# Patient Record
Sex: MALE | Race: OTHER | Employment: FULL TIME | URBAN - METROPOLITAN AREA
[De-identification: names, ages, dates, MRNs, and addresses within clinical notes are randomized per-mention and may not be internally consistent; named-entity substitution may affect disease eponyms.]

---

## 2019-03-29 ENCOUNTER — LAB VISIT (OUTPATIENT)
Dept: LAB | Facility: HOSPITAL | Age: 39
End: 2019-03-29
Attending: INTERNAL MEDICINE
Payer: COMMERCIAL

## 2019-03-29 ENCOUNTER — OFFICE VISIT (OUTPATIENT)
Dept: INTERNAL MEDICINE | Facility: CLINIC | Age: 39
End: 2019-03-29
Payer: COMMERCIAL

## 2019-03-29 VITALS
BODY MASS INDEX: 24.48 KG/M2 | SYSTOLIC BLOOD PRESSURE: 124 MMHG | DIASTOLIC BLOOD PRESSURE: 80 MMHG | HEART RATE: 107 BPM | HEIGHT: 72 IN | OXYGEN SATURATION: 95 % | WEIGHT: 180.75 LBS

## 2019-03-29 DIAGNOSIS — Z00.00 ENCOUNTER FOR MEDICAL EXAMINATION TO ESTABLISH CARE: Primary | ICD-10-CM

## 2019-03-29 DIAGNOSIS — J32.9 RECURRENT SINUS INFECTIONS: ICD-10-CM

## 2019-03-29 DIAGNOSIS — J21.9 ACUTE BRONCHIOLITIS WITH BRONCHOSPASM: ICD-10-CM

## 2019-03-29 DIAGNOSIS — Z00.00 ENCOUNTER FOR MEDICAL EXAMINATION TO ESTABLISH CARE: ICD-10-CM

## 2019-03-29 DIAGNOSIS — J30.1 SEASONAL ALLERGIC RHINITIS DUE TO POLLEN: ICD-10-CM

## 2019-03-29 LAB
25(OH)D3+25(OH)D2 SERPL-MCNC: 16 NG/ML (ref 30–96)
ALBUMIN SERPL BCP-MCNC: 4.5 G/DL (ref 3.5–5.2)
ALP SERPL-CCNC: 78 U/L (ref 55–135)
ALT SERPL W/O P-5'-P-CCNC: 13 U/L (ref 10–44)
ANION GAP SERPL CALC-SCNC: 8 MMOL/L (ref 8–16)
AST SERPL-CCNC: 18 U/L (ref 10–40)
BASOPHILS # BLD AUTO: 0.05 K/UL (ref 0–0.2)
BASOPHILS NFR BLD: 0.5 % (ref 0–1.9)
BILIRUB SERPL-MCNC: 0.4 MG/DL (ref 0.1–1)
BUN SERPL-MCNC: 9 MG/DL (ref 6–20)
CALCIUM SERPL-MCNC: 10 MG/DL (ref 8.7–10.5)
CHLORIDE SERPL-SCNC: 104 MMOL/L (ref 95–110)
CHOLEST SERPL-MCNC: 173 MG/DL (ref 120–199)
CHOLEST/HDLC SERPL: 4.7 {RATIO} (ref 2–5)
CO2 SERPL-SCNC: 27 MMOL/L (ref 23–29)
COMPLEXED PSA SERPL-MCNC: 0.81 NG/ML (ref 0–4)
CREAT SERPL-MCNC: 0.9 MG/DL (ref 0.5–1.4)
CRP SERPL-MCNC: 3.8 MG/L (ref 0–8.2)
DIFFERENTIAL METHOD: ABNORMAL
EOSINOPHIL # BLD AUTO: 0.4 K/UL (ref 0–0.5)
EOSINOPHIL NFR BLD: 4.7 % (ref 0–8)
ERYTHROCYTE [DISTWIDTH] IN BLOOD BY AUTOMATED COUNT: 14.6 % (ref 11.5–14.5)
ERYTHROCYTE [SEDIMENTATION RATE] IN BLOOD BY WESTERGREN METHOD: 25 MM/HR (ref 0–23)
EST. GFR  (AFRICAN AMERICAN): >60 ML/MIN/1.73 M^2
EST. GFR  (NON AFRICAN AMERICAN): >60 ML/MIN/1.73 M^2
ESTIMATED AVG GLUCOSE: 108 MG/DL (ref 68–131)
GLUCOSE SERPL-MCNC: 88 MG/DL (ref 70–110)
HBA1C MFR BLD HPLC: 5.4 % (ref 4–5.6)
HCT VFR BLD AUTO: 42.9 % (ref 40–54)
HDLC SERPL-MCNC: 37 MG/DL (ref 40–75)
HDLC SERPL: 21.4 % (ref 20–50)
HGB BLD-MCNC: 14.1 G/DL (ref 14–18)
LDLC SERPL CALC-MCNC: 119 MG/DL (ref 63–159)
LYMPHOCYTES # BLD AUTO: 2 K/UL (ref 1–4.8)
LYMPHOCYTES NFR BLD: 20.9 % (ref 18–48)
MCH RBC QN AUTO: 26.6 PG (ref 27–31)
MCHC RBC AUTO-ENTMCNC: 32.9 G/DL (ref 32–36)
MCV RBC AUTO: 81 FL (ref 82–98)
MONOCYTES # BLD AUTO: 0.3 K/UL (ref 0.3–1)
MONOCYTES NFR BLD: 3.5 % (ref 4–15)
NEUTROPHILS # BLD AUTO: 6.5 K/UL (ref 1.8–7.7)
NEUTROPHILS NFR BLD: 70.2 % (ref 38–73)
NONHDLC SERPL-MCNC: 136 MG/DL
PLATELET # BLD AUTO: 305 K/UL (ref 150–350)
PMV BLD AUTO: 9.2 FL (ref 9.2–12.9)
POTASSIUM SERPL-SCNC: 4.2 MMOL/L (ref 3.5–5.1)
PROT SERPL-MCNC: 8.3 G/DL (ref 6–8.4)
RBC # BLD AUTO: 5.31 M/UL (ref 4.6–6.2)
SODIUM SERPL-SCNC: 139 MMOL/L (ref 136–145)
TRIGL SERPL-MCNC: 85 MG/DL (ref 30–150)
TSH SERPL DL<=0.005 MIU/L-ACNC: 0.42 UIU/ML (ref 0.4–4)
WBC # BLD AUTO: 9.32 K/UL (ref 3.9–12.7)

## 2019-03-29 PROCEDURE — 83036 HEMOGLOBIN GLYCOSYLATED A1C: CPT

## 2019-03-29 PROCEDURE — 99999 PR PBB SHADOW E&M-NEW PATIENT-LVL IV: ICD-10-PCS | Mod: PBBFAC,,, | Performed by: INTERNAL MEDICINE

## 2019-03-29 PROCEDURE — 99204 PR OFFICE/OUTPT VISIT, NEW, LEVL IV, 45-59 MIN: ICD-10-PCS | Mod: S$GLB,,, | Performed by: INTERNAL MEDICINE

## 2019-03-29 PROCEDURE — 99999 PR PBB SHADOW E&M-NEW PATIENT-LVL IV: CPT | Mod: PBBFAC,,, | Performed by: INTERNAL MEDICINE

## 2019-03-29 PROCEDURE — 84153 ASSAY OF PSA TOTAL: CPT

## 2019-03-29 PROCEDURE — 85025 COMPLETE CBC W/AUTO DIFF WBC: CPT

## 2019-03-29 PROCEDURE — 99204 OFFICE O/P NEW MOD 45 MIN: CPT | Mod: S$GLB,,, | Performed by: INTERNAL MEDICINE

## 2019-03-29 PROCEDURE — 80061 LIPID PANEL: CPT

## 2019-03-29 PROCEDURE — 36415 COLL VENOUS BLD VENIPUNCTURE: CPT

## 2019-03-29 PROCEDURE — 80053 COMPREHEN METABOLIC PANEL: CPT

## 2019-03-29 PROCEDURE — 82306 VITAMIN D 25 HYDROXY: CPT

## 2019-03-29 PROCEDURE — 86140 C-REACTIVE PROTEIN: CPT

## 2019-03-29 PROCEDURE — 84443 ASSAY THYROID STIM HORMONE: CPT

## 2019-03-29 PROCEDURE — 85652 RBC SED RATE AUTOMATED: CPT

## 2019-03-29 RX ORDER — AZELASTINE 1 MG/ML
1 SPRAY, METERED NASAL 2 TIMES DAILY PRN
Qty: 30 ML | Refills: 11 | Status: SHIPPED | OUTPATIENT
Start: 2019-03-29 | End: 2019-03-29 | Stop reason: SDUPTHER

## 2019-03-29 RX ORDER — MOMETASONE FUROATE 50 UG/1
2 SPRAY, METERED NASAL 2 TIMES DAILY
Qty: 1 EACH | Refills: 11 | Status: SHIPPED | OUTPATIENT
Start: 2019-03-29 | End: 2020-04-09

## 2019-03-29 RX ORDER — DEXAMETHASONE 4 MG/1
4 TABLET ORAL EVERY 6 HOURS
Qty: 28 TABLET | Refills: 0 | Status: SHIPPED | OUTPATIENT
Start: 2019-03-29 | End: 2019-04-05

## 2019-03-29 RX ORDER — ALBUTEROL SULFATE 1.25 MG/3ML
1.25 SOLUTION RESPIRATORY (INHALATION) EVERY 6 HOURS PRN
Qty: 1 BOX | Refills: 11 | Status: SHIPPED | OUTPATIENT
Start: 2019-03-29 | End: 2019-04-11

## 2019-03-29 RX ORDER — NYSTATIN 100000 [USP'U]/ML
4 SUSPENSION ORAL 4 TIMES DAILY
Qty: 240 ML | Refills: 0 | Status: SHIPPED | OUTPATIENT
Start: 2019-03-29 | End: 2019-03-29 | Stop reason: SDUPTHER

## 2019-03-29 RX ORDER — MONTELUKAST SODIUM 10 MG/1
TABLET ORAL
Refills: 3 | COMMUNITY
Start: 2019-03-11 | End: 2019-03-29

## 2019-03-29 RX ORDER — MOXIFLOXACIN HYDROCHLORIDE 400 MG/1
400 TABLET ORAL DAILY
Qty: 15 TABLET | Refills: 0 | Status: SHIPPED | OUTPATIENT
Start: 2019-03-29 | End: 2019-03-29 | Stop reason: SDUPTHER

## 2019-03-29 RX ORDER — AZELASTINE 1 MG/ML
SPRAY, METERED NASAL
Refills: 5 | COMMUNITY
Start: 2019-03-02 | End: 2019-03-29 | Stop reason: SDUPTHER

## 2019-03-29 RX ORDER — DEXAMETHASONE 4 MG/1
4 TABLET ORAL EVERY 6 HOURS
Qty: 28 TABLET | Refills: 0 | Status: SHIPPED | OUTPATIENT
Start: 2019-03-29 | End: 2019-03-29 | Stop reason: SDUPTHER

## 2019-03-29 RX ORDER — AZELASTINE 1 MG/ML
1 SPRAY, METERED NASAL 2 TIMES DAILY PRN
Qty: 30 ML | Refills: 11 | Status: SHIPPED | OUTPATIENT
Start: 2019-03-29 | End: 2020-01-22

## 2019-03-29 RX ORDER — NYSTATIN 100000 [USP'U]/ML
4 SUSPENSION ORAL 4 TIMES DAILY
Qty: 240 ML | Refills: 0 | Status: SHIPPED | OUTPATIENT
Start: 2019-03-29 | End: 2019-04-13

## 2019-03-29 RX ORDER — ALBUTEROL SULFATE 90 UG/1
2 AEROSOL, METERED RESPIRATORY (INHALATION) EVERY 6 HOURS PRN
Qty: 18 G | Refills: 11 | Status: SHIPPED | OUTPATIENT
Start: 2019-03-29 | End: 2019-08-27

## 2019-03-29 RX ORDER — MOMETASONE FUROATE 50 UG/1
2 SPRAY, METERED NASAL 2 TIMES DAILY
Qty: 1 EACH | Refills: 11 | Status: SHIPPED | OUTPATIENT
Start: 2019-03-29 | End: 2019-03-29 | Stop reason: SDUPTHER

## 2019-03-29 RX ORDER — MOMETASONE FUROATE 50 UG/1
SPRAY, METERED NASAL
Refills: 0 | COMMUNITY
Start: 2018-12-21 | End: 2019-03-29 | Stop reason: SDUPTHER

## 2019-03-29 RX ORDER — ALBUTEROL SULFATE 1.25 MG/3ML
1.25 SOLUTION RESPIRATORY (INHALATION) EVERY 6 HOURS PRN
Qty: 1 BOX | Refills: 11 | Status: SHIPPED | OUTPATIENT
Start: 2019-03-29 | End: 2019-03-29 | Stop reason: SDUPTHER

## 2019-03-29 RX ORDER — MOXIFLOXACIN HYDROCHLORIDE 400 MG/1
400 TABLET ORAL DAILY
Qty: 15 TABLET | Refills: 0 | Status: SHIPPED | OUTPATIENT
Start: 2019-03-29 | End: 2019-04-11

## 2019-03-29 RX ORDER — ALBUTEROL SULFATE 90 UG/1
2 AEROSOL, METERED RESPIRATORY (INHALATION) EVERY 6 HOURS PRN
Qty: 18 G | Refills: 11 | Status: SHIPPED | OUTPATIENT
Start: 2019-03-29 | End: 2019-03-29 | Stop reason: SDUPTHER

## 2019-03-29 NOTE — PROGRESS NOTES
"INTERNAL MEDICINE CLINIC    Initial Visit to Establish Care    PRESENTING HISTORY     Previous PCP: Emanuel Evans MD  Chief Complaint/Reason for Visit:     Chief Complaint   Patient presents with    Follow-up     History of Present Illness & ROS : Mr. Miguel Gaffney is a 38 y.o. male.      7-17-18 [facility]  HPI:   Miguel Rivera" is a 38 y.o.male [occupation: Interventional Cardiologist at [employer]    Labs 7/20/18  . ASCVD 10yr risk = 1.2%    - 7/20/18: Glc 92, A1C 5.7     Diet: Red meat 0x/wk. Mostly chicken/fish. Coffee 1-3x/day. Soda 2x/mo. Eggs 1/wk. Milk/Cheese 7x/wk. Fast food 0x/wk.   Exercise: ~8k steps/day    H/o chronic intermittent sinusitis with weather changes. 1mo ago went to [facility] and since then has had a cough. Mostly productive in the AM but dries out throughout the day. Not feeling sick. No F/C/NS. Using nasal saline solution BID. Has not tried other therapies.     At times feels skipped beats, mostly at night. Drinking ~1-3 coffees/day.   Thoughts that you would be better off dead, or of hurting yourself in some way: 0  PHQ-9 Total Score: 0  Past Medical History:   Diagnosis Date    Allergic state     History reviewed. No pertinent surgical history.   Patient's Medications   No medications on file     Allergies:Allergies no known allergies   Family History   Problem Relation Age of Onset    Hypertension Mother    Hypertension Father    Diabetes Father    Colon cancer Neg Hx    Breast cancer Neg Hx    Ovarian cancer Neg Hx    Prostate cancer Neg Hx     Social History    Marital status:      Social History Main Topics    Smoking status: Never Smoker    Smokeless tobacco: Never Used    Alcohol use No   Comment: 2x/mo    Drug use: No    Sexual activity: Yes   Partners: Female   Comment:      Physical Exam:   Blood pressure 113/81, pulse 99, height 1.829 m (6'), weight 80.7 kg (178 lb). Body mass index is 24.14 kg/(m^2). "   ______________________________________________________________________    Today:  Dec he had sinusitis in Dec.  Had recurrence. Treated with Augmentin 10-14 days X 3.  One course of Bactrim. Had medol dose pack.    CT 1/7.19: Pansinusitis and bilateral cervical lymph nodes. Occlusion of bilateral ostiomeatal complexes.    He wants to start from scratch.    Currently he complains of some wheezing for 5 days.  Waking up 4-5 am with phlegm and cough.    Prior to December, he would have 1-2 episodes of sinusitis per year.  Was in [place]. 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    Review of Systems   Constitutional: Negative for chills, fever and weight loss.   HENT: Positive for congestion and sinus pain.    Eyes: Negative for blurred vision.   Respiratory: Positive for cough, sputum production, shortness of breath (waking at night) and wheezing.    Cardiovascular: Negative for chest pain and leg swelling.   Gastrointestinal: Negative for abdominal pain, heartburn, nausea and vomiting.   Genitourinary: Negative for dysuria and urgency.   Musculoskeletal: Negative for back pain and joint pain.   Skin: Negative for rash.   Neurological: Negative for dizziness and headaches.   Psychiatric/Behavioral: Negative for depression.       PAST HISTORY:     Past Medical History:   Diagnosis Date    Recurrent sinus infections     Seasonal allergic rhinitis due to pollen        History reviewed. No pertinent surgical history.    Family History   Problem Relation Age of Onset    Hypertension Mother     Diabetes Father     Heart disease Father 55       Social History     Socioeconomic History    Marital status:      Spouse name: Liam    Number of children: 2    Years of education: None    Highest education level: None   Occupational History    None   Social Needs    Financial resource strain: None    Food insecurity:     Worry: None     Inability: None    Transportation needs:      Medical: None     Non-medical: None   Tobacco Use    Smoking status: Never Smoker    Smokeless tobacco: Never Used   Substance and Sexual Activity    Alcohol use: Yes     Comment: occasional    Drug use: Not Currently    Sexual activity: Yes     Partners: Female   Lifestyle    Physical activity:     Days per week: None     Minutes per session: None    Stress: None   Relationships    Social connections:     Talks on phone: None     Gets together: None     Attends Jehovah's witness service: None     Active member of club or organization: None     Attends meetings of clubs or organizations: None     Relationship status: None    Intimate partner violence:     Fear of current or ex partner: None     Emotionally abused: None     Physically abused: None     Forced sexual activity: None   Other Topics Concern    None   Social History Narrative    Was in [place]. 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    : Liam Horta boys 11 and 7 as of 2019.       MEDICATIONS & ALLERGIES:     Current Outpatient Medications on File Prior to Visit   Medication Sig Dispense Refill    azelastine (ASTELIN) 137 mcg (0.1 %) nasal spray SPR ONCE IEN BID FOR 2 WKS  5     mometasone (NASONEX) 50 mcg/actuation nasal spray USE ONE SPRAY IN EACH NOSTRIL QD UP TO BID  0     montelukast (SINGULAIR) 10 mg tablet TK 1 T PO QD  3     No current facility-administered medications on file prior to visit.         Review of patient's allergies indicates:  No Known Allergies    Medications Reconciliation:   I have reconciled the patient's home medications with the patient/family. I have updated all changes.  Refer to After-Visit Medication List.    OBJECTIVE:     Vital Signs:  Vitals:    03/29/19 1514   BP: 124/80   Pulse: 107     Wt Readings from Last 1 Encounters:   03/29/19 1514 82 kg (180 lb 12.4 oz)     Body mass index is 24.52 kg/m².     Physical Exam:  General: Well developed, well nourished. No distress.  HEENT: Head  is normocephalic, atraumatic; ears are normal.    Eyes: Clear conjunctiva.  Neck: Supple, symmetrical neck; trachea midline.  Lungs: normal respiratory effort. Bilateral expiratory wheezing.  Cardiovascular: Heart with regular rate and rhythm.    Extremities: No LE edema.    Abdomen: Abdomen is soft, non-tender non-distended with normal bowel sounds.  Musculoskeletal: Normal gait.   Genital:  Normal penis. Foreskin retractable. No rash.  Scrotum and epididymis normal. No inguinal hernia.  No inguinal nodes. No rash found.  Rectal: Deferred.  Lymph Nodes: No cervical, supraclavicular or axillary adenopathy.  Psychiatric: Normal affect. Alert.      ASSESSMENT & PLAN:     New patient who has not seen Primary Care Provider at [facility] for the past 3 years.  Patient is new to me. Medical, surgical, social, medication and allergy histories were obtained during this visit.    Encounter for medical examination to establish care  - Reviewed and updated past and current medical problems.  Discussed treatment of current medical problems    -     Lipid panel; Future; Expected date: 03/29/2019  -     CBC auto differential; Future; Expected date: 03/29/2019  -     Comprehensive metabolic panel; Future; Expected date: 03/29/2019  -     TSH; Future; Expected date: 03/29/2019  -     Hemoglobin A1c; Future; Expected date: 03/29/2019  -     PSA, Screening; Future; Expected date: 03/29/2019  -     Vitamin D; Future; Expected date: 09/25/2019  -     Sedimentation rate; Future; Expected date: 03/29/2019  -     C-reactive protein; Future; Expected date: 03/29/2019    Recurrent sinus infections  Seasonal allergic rhinitis due to pollen  Acute bronchiolitis with bronchospasm  - Recurrent sinusitis. Now with acute bronchospasm.    Plan: Treat for 15 days.             Will need PFT on return visit.       -     dexamethasone (DECADRON) 4 MG Tab; Take 1 tablet (4 mg total) by mouth every 6 (six) hours. for 7 days  Dispense: 28 tablet; Refill:  0  -     albuterol (VENTOLIN HFA) 90 mcg/actuation inhaler; Inhale 2 puffs into the lungs every 6 (six) hours as needed for Wheezing. Rescue  Dispense: 18 g; Refill: 11  -     albuterol (ACCUNEB) 1.25 mg/3 mL Nebu; Take 3 mLs (1.25 mg total) by nebulization every 6 (six) hours as needed. Rescue  Dispense: 1 Box; Refill: 11  -     NEBULIZER FOR HOME USE  -     Ambulatory consult to ENT  -     Ambulatory consult to Allergy  -     mometasone (NASONEX) 50 mcg/actuation nasal spray; 2 sprays by Nasal route 2 (two) times daily.  Dispense: 1 each; Refill: 11  -     azelastine (ASTELIN) 137 mcg (0.1 %) nasal spray; 1 spray (137 mcg total) by Nasal route 2 (two) times daily as needed for Rhinitis.  Dispense: 30 mL; Refill: 11  -     moxifloxacin (AVELOX) 400 mg tablet; Take 1 tablet (400 mg total) by mouth once daily. for 15 days  Dispense: 15 tablet; Refill: 0  -     nystatin (MYCOSTATIN) 100,000 unit/mL suspension; Take 4 mLs (400,000 Units total) by mouth 4 (four) times daily. for 15 days  Dispense: 240 mL; Refill: 0    Preventive Health Maintenance:  To review next visit.    Return to Clinic for Follow Up with me:   April 11.      Scheduled Follow-up :  Future Appointments   Date Time Provider Department Center   4/11/2019  5:00 PM Emanuel Evans MD [facility] IM [facility]   4/23/2019  3:20 PM JAMES Marx III, MD [facility] ALLERGY [facility]       After Visit Medication List :     Medication List           Accurate as of 3/29/19  5:14 PM. If you have any questions, ask your nurse or doctor.               START taking these medications    * albuterol 1.25 mg/3 mL Nebu  Commonly known as:  ACCUNEB  Take 3 mLs (1.25 mg total) by nebulization every 6 (six) hours as needed. Rescue  Started by:  Emanuel Evans MD     * albuterol 90 mcg/actuation inhaler  Commonly known as:  VENTOLIN HFA  Inhale 2 puffs into the lungs every 6 (six) hours as needed for Wheezing. Rescue  Started by:  Emanuel Evans MD     dexamethasone 4 MG  Tab  Commonly known as:  DECADRON  Take 1 tablet (4 mg total) by mouth every 6 (six) hours. for 7 days  Started by:  Emanuel Evans MD     moxifloxacin 400 mg tablet  Commonly known as:  AVELOX  Take 1 tablet (400 mg total) by mouth once daily. for 15 days  Started by:  Emanuel Evans MD     nystatin 100,000 unit/mL suspension  Commonly known as:  MYCOSTATIN  Take 4 mLs (400,000 Units total) by mouth 4 (four) times daily. for 15 days  Started by:  Emanuel Evans MD         * This list has 2 medication(s) that are the same as other medications prescribed for you. Read the directions carefully, and ask your doctor or other care provider to review them with you.            CHANGE how you take these medications    azelastine 137 mcg (0.1 %) nasal spray  Commonly known as:  ASTELIN  1 spray (137 mcg total) by Nasal route 2 (two) times daily as needed for Rhinitis.  What changed:  See the new instructions.  Changed by:  Emanuel Evans MD     mometasone 50 mcg/actuation nasal spray  Commonly known as:  NASONEX  2 sprays by Nasal route 2 (two) times daily.  What changed:  See the new instructions.  Changed by:  Emanuel Evans MD        STOP taking these medications    montelukast 10 mg tablet  Commonly known as:  SINGULAIR  Stopped by:  Emanuel Evans MD           Where to Get Your Medications      These medications were sent to [facility] [address] [place] [address] [place] - [address] AT [facility]  [address] [place] [address]    Hours:  24-hours Phone:  [phone]   · albuterol 1.25 mg/3 mL Nebu  · albuterol 90 mcg/actuation inhaler  · azelastine 137 mcg (0.1 %) nasal spray  · dexamethasone 4 MG Tab  · mometasone 50 mcg/actuation nasal spray  · moxifloxacin 400 mg tablet  · nystatin 100,000 unit/mL suspension         Signing Physician:  Emanuel Evans MD

## 2019-03-30 PROBLEM — E55.9 VITAMIN D INSUFFICIENCY: Status: ACTIVE | Noted: 2019-03-30

## 2019-04-11 ENCOUNTER — OFFICE VISIT (OUTPATIENT)
Dept: INTERNAL MEDICINE | Facility: CLINIC | Age: 39
End: 2019-04-11
Payer: COMMERCIAL

## 2019-04-11 VITALS
HEIGHT: 72 IN | SYSTOLIC BLOOD PRESSURE: 126 MMHG | BODY MASS INDEX: 24.61 KG/M2 | WEIGHT: 181.69 LBS | OXYGEN SATURATION: 95 % | DIASTOLIC BLOOD PRESSURE: 68 MMHG | HEART RATE: 105 BPM | TEMPERATURE: 98 F

## 2019-04-11 DIAGNOSIS — E55.9 VITAMIN D INSUFFICIENCY: ICD-10-CM

## 2019-04-11 DIAGNOSIS — J30.1 SEASONAL ALLERGIC RHINITIS DUE TO POLLEN: ICD-10-CM

## 2019-04-11 DIAGNOSIS — J21.9 ACUTE BRONCHIOLITIS WITH BRONCHOSPASM: ICD-10-CM

## 2019-04-11 DIAGNOSIS — J32.9 RECURRENT SINUS INFECTIONS: Primary | ICD-10-CM

## 2019-04-11 PROCEDURE — 99214 PR OFFICE/OUTPT VISIT, EST, LEVL IV, 30-39 MIN: ICD-10-PCS | Mod: S$GLB,,, | Performed by: INTERNAL MEDICINE

## 2019-04-11 PROCEDURE — 99999 PR PBB SHADOW E&M-EST. PATIENT-LVL III: ICD-10-PCS | Mod: PBBFAC,,, | Performed by: INTERNAL MEDICINE

## 2019-04-11 PROCEDURE — 99214 OFFICE O/P EST MOD 30 MIN: CPT | Mod: S$GLB,,, | Performed by: INTERNAL MEDICINE

## 2019-04-11 PROCEDURE — 99999 PR PBB SHADOW E&M-EST. PATIENT-LVL III: CPT | Mod: PBBFAC,,, | Performed by: INTERNAL MEDICINE

## 2019-04-11 RX ORDER — DESLORATADINE 5 MG/1
5 TABLET, ORALLY DISINTEGRATING ORAL DAILY
Qty: 90 TABLET | Refills: 3 | Status: SHIPPED | OUTPATIENT
Start: 2019-04-11 | End: 2019-05-23

## 2019-04-11 RX ORDER — AZITHROMYCIN 500 MG/1
500 TABLET, FILM COATED ORAL DAILY
Qty: 10 TABLET | Refills: 0 | Status: SHIPPED | OUTPATIENT
Start: 2019-04-11 | End: 2019-04-21

## 2019-04-11 RX ORDER — LEVALBUTEROL 1.25 MG/.5ML
1 SOLUTION, CONCENTRATE RESPIRATORY (INHALATION) EVERY 4 HOURS PRN
Qty: 1 BOX | Refills: 11 | Status: SHIPPED | OUTPATIENT
Start: 2019-04-11 | End: 2019-08-23

## 2019-04-11 RX ORDER — IBUPROFEN 100 MG/5ML
2000 SUSPENSION, ORAL (FINAL DOSE FORM) ORAL DAILY
COMMUNITY
Start: 2019-04-11 | End: 2019-08-23

## 2019-04-11 RX ORDER — LORATADINE 10 MG/1
10 TABLET ORAL DAILY
Refills: 0 | COMMUNITY
Start: 2019-04-11 | End: 2019-04-11

## 2019-04-11 RX ORDER — ACETAMINOPHEN 500 MG
1 TABLET ORAL DAILY
Qty: 90 CAPSULE | Refills: 3 | Status: ON HOLD | OUTPATIENT
Start: 2019-04-11 | End: 2019-12-16

## 2019-04-11 NOTE — PROGRESS NOTES
INTERNAL MEDICINE CLINIC  Follow-up Visit Progress Note     PRESENTING HISTORY     PCP: Emanuel Evans MD    Last Clinic Visit with me:     Current Chief Complaint/Problem:    Chief Complaint   Patient presents with    Follow-up     History of Present Illness & ROS: Mr. Miguel Gaffney is a 38 y.o. male.    Went to  ED 4-7 for worsening symptoms.  CT left maxillary sinusitis.    Off steroid 4-6.    He is using Neti-Pot.    Now with clear nasal discharge.  Not SOB.    Answers for HPI/ROS submitted by the patient on 4/10/2019   Cough  Chronicity: recurrent  Progression since onset: gradually worsening  Frequency: every few hours  Cough characteristics: productive of brown sputum  chest pain: No  chills: No  ear pain: No  fever: No  heartburn: Yes  hemoptysis: No  nasal congestion: Yes  postnasal drip: Yes  rhinorrhea: Yes  shortness of breath: Yes  sweats: No  weight loss: No  Aggravated by: lying down, pollens, stress  asthma: No  bronchiectasis: No  bronchitis: No  COPD: No  emphysema: No  environmental allergies: No  pneumonia: No  Treatments tried: OTC cough suppressant, a beta-agonist inhaler, body position changes, leukotriene antagonists, oral steroids      PAST HISTORY:     Past Medical History:   Diagnosis Date    Acute bronchiolitis with bronchospasm 3/29/2019    Recurrent sinus infections     Seasonal allergic rhinitis due to pollen     Vitamin D insufficiency 3/30/2019    Recommend Vitamin D 2000 IU daily.       No past surgical history on file.    Family History   Problem Relation Age of Onset    Hypertension Mother     Diabetes Father     Heart disease Father 55       Social History     Socioeconomic History    Marital status:      Spouse name: Liam    Number of children: 2    Years of education: Not on file    Highest education level: Not on file   Occupational History    Not on file   Social Needs    Financial resource strain: Not on file    Food insecurity:     Worry: Not on  file     Inability: Not on file    Transportation needs:     Medical: Not on file     Non-medical: Not on file   Tobacco Use    Smoking status: Never Smoker    Smokeless tobacco: Never Used   Substance and Sexual Activity    Alcohol use: Yes     Comment: occasional    Drug use: Not Currently    Sexual activity: Yes     Partners: Female   Lifestyle    Physical activity:     Days per week: Not on file     Minutes per session: Not on file    Stress: Not on file   Relationships    Social connections:     Talks on phone: Not on file     Gets together: Not on file     Attends [employer] service: Not on file     Active member of club or organization: Not on file     Attends meetings of clubs or organizations: Not on file     Relationship status: Not on file   Other Topics Concern    Not on file   Social History Narrative    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    : Liam Horta boys 11 and 7 as of 2019.       MEDICATIONS & ALLERGIES:     Current Outpatient Medications on File Prior to Visit   Medication Sig Dispense Refill    albuterol (ACCUNEB) 1.25 mg/3 mL Nebu Take 3 mLs (1.25 mg total) by nebulization every 6 (six) hours as needed. Rescue 1 Box 11    albuterol (VENTOLIN HFA) 90 mcg/actuation inhaler Inhale 2 puffs into the lungs every 6 (six) hours as needed for Wheezing. Rescue 18 g 11    azelastine (ASTELIN) 137 mcg (0.1 %) nasal spray 1 spray (137 mcg total) by Nasal route 2 (two) times daily as needed for Rhinitis. 30 mL 11    mometasone (NASONEX) 50 mcg/actuation nasal spray 2 sprays by Nasal route 2 (two) times daily. 1 each 11    moxifloxacin (AVELOX) 400 mg tablet Take 1 tablet (400 mg total) by mouth once daily. for 15 days 15 tablet 0    nystatin (MYCOSTATIN) 100,000 unit/mL suspension Take 4 mLs (400,000 Units total) by mouth 4 (four) times daily. for 15 days 240 mL 0     No current facility-administered medications on file prior to visit.          Review of patient's allergies indicates:  No Known Allergies    Medications Reconciliation:   I have reconciled the patient's home medications and discharge medications with the patient/family. I have updated all changes.  Refer to After-Visit Medication List.    OBJECTIVE:     Vital Signs:  Vitals:    04/11/19 1659   BP: 126/68   Pulse: 105   Temp: 98.2 °F (36.8 °C)     Wt Readings from Last 1 Encounters:   04/11/19 1659 82.4 kg (181 lb 10.5 oz)     Body mass index is 24.64 kg/m².     Physical Exam:  General: Well developed, well nourished. No distress.  HEENT: Head is normocephalic, atraumatic.  Ears: both external ears are normal.  TMs are normal bilaterally.  Pharynx - normal.  Sinus - no sinus tenderness.  Eyes: Clear conjunctiva bilaterally.  Neck: Supple, symmetrical neck; trachea midline.  Lymph Nodes: No cervical or supraclavicular adenopathy.  Lungs: Clear to auscultation bilaterally and normal respiratory effort. No wheezing but overall decreased breath sounds.  Cardiovascular: Heart with regular rate and rhythm.      Laboratory  Lab Results   Component Value Date    WBC 9.32 03/29/2019    HGB 14.1 03/29/2019    HCT 42.9 03/29/2019     03/29/2019    CHOL 173 03/29/2019    TRIG 85 03/29/2019    HDL 37 (L) 03/29/2019    ALT 13 03/29/2019    AST 18 03/29/2019     03/29/2019    K 4.2 03/29/2019     03/29/2019    CREATININE 0.9 03/29/2019    BUN 9 03/29/2019    CO2 27 03/29/2019    TSH 0.420 03/29/2019    PSA 0.81 03/29/2019    HGBA1C 5.4 03/29/2019       ASSESSMENT & PLAN:     Recurrent sinus infections  Seasonal allergic rhinitis due to pollen  Acute bronchiolitis with bronchospasm  - Recurrent sinusitis. Now with acute bronchospasm.    Augmentin then Bactrim then Augmentin then Avelox.   - Off steroid and Avelox.    Still not 100% better.    Plan:   -     azithromycin (ZITHROMAX) 500 MG tablet; Take 1 tablet (500 mg total) by mouth once daily. for 10 days  Dispense: 10 tablet; Refill:  0    -     ascorbic acid, vitamin C, (VITAMIN C) 1000 MG tablet; Take 2 tablets (2,000 mg total) by mouth once daily.  -     desloratadine (CLARINEX REDITAB) 5 mg disintegrating tablet; Take 1 tablet (5 mg total) by mouth once daily.  Dispense: 90 tablet; Refill: 3  -     levalbuterol (XOPENEX) 1.25 mg/0.5 mL nebulizer solution; Take 0.5 mLs (1.25 mg total) by nebulization every 4 (four) hours as needed for Wheezing. Rescue  Dispense: 1 Box; Refill: 11  Less tachycardia for the patient.    Vitamin D insufficiency  -     cholecalciferol, vitamin D3, (VITAMIN D3) 2,000 unit Cap; Take 1 capsule (2,000 Units total) by mouth once daily.  Dispense: 90 capsule; Refill: 3    Preventive Health Maintenance:   To review next visit.     Return to Clinic for Follow Up with me:   1 year.    Scheduled Follow-up :  Future Appointments   Date Time Provider Department Center   4/23/2019  3:20 PM JAMES Marx III, MD [facility] ALLERGY [facility]       After Visit Medication List :     Medication List           Accurate as of 4/11/19  5:23 PM. If you have any questions, ask your nurse or doctor.               START taking these medications    ascorbic acid (vitamin C) 1000 MG tablet  Commonly known as:  VITAMIN C  Take 2 tablets (2,000 mg total) by mouth once daily.  Started by:  Emanuel Evans MD     azithromycin 500 MG tablet  Commonly known as:  ZITHROMAX  Take 1 tablet (500 mg total) by mouth once daily. for 10 days  Started by:  Emanuel Evans MD     cholecalciferol (vitamin D3) 2,000 unit Cap  Commonly known as:  VITAMIN D3  Take 1 capsule (2,000 Units total) by mouth once daily.  Started by:  Emanuel Evans MD     desloratadine 5 mg disintegrating tablet  Commonly known as:  CLARINEX REDITAB  Take 1 tablet (5 mg total) by mouth once daily.  Started by:  Emanuel Evans MD     levalbuterol 1.25 mg/0.5 mL nebulizer solution  Commonly known as:  XOPENEX  Take 0.5 mLs (1.25 mg total) by nebulization every 4 (four) hours as needed for  Wheezing. Rescue  Started by:  Emanuel Evans MD        CHANGE how you take these medications    albuterol 90 mcg/actuation inhaler  Commonly known as:  VENTOLIN HFA  Inhale 2 puffs into the lungs every 6 (six) hours as needed for Wheezing. Rescue  What changed:  Another medication with the same name was removed. Continue taking this medication, and follow the directions you see here.  Changed by:  Emanuel Evans MD        CONTINUE taking these medications    azelastine 137 mcg (0.1 %) nasal spray  Commonly known as:  ASTELIN  1 spray (137 mcg total) by Nasal route 2 (two) times daily as needed for Rhinitis.     mometasone 50 mcg/actuation nasal spray  Commonly known as:  NASONEX  2 sprays by Nasal route 2 (two) times daily.     nystatin 100,000 unit/mL suspension  Commonly known as:  MYCOSTATIN  Take 4 mLs (400,000 Units total) by mouth 4 (four) times daily. for 15 days        STOP taking these medications    moxifloxacin 400 mg tablet  Commonly known as:  AVELOX  Stopped by:  Emanuel Evans MD           Where to Get Your Medications      These medications were sent to [facility] [address] [place] [address] [place] - [address] AT [facility]  [address] [place] [address]    Hours:  24-hours Phone:  [phone]   · azithromycin 500 MG tablet  · cholecalciferol (vitamin D3) 2,000 unit Cap  · desloratadine 5 mg disintegrating tablet  · levalbuterol 1.25 mg/0.5 mL nebulizer solution     You can get these medications from any pharmacy    You don't need a prescription for these medications  · ascorbic acid (vitamin C) 1000 MG tablet         Signing Physician:  Emanuel Evans MD

## 2019-05-14 ENCOUNTER — OFFICE VISIT (OUTPATIENT)
Dept: ALLERGY | Facility: CLINIC | Age: 39
End: 2019-05-14
Payer: COMMERCIAL

## 2019-05-14 VITALS
SYSTOLIC BLOOD PRESSURE: 126 MMHG | DIASTOLIC BLOOD PRESSURE: 84 MMHG | BODY MASS INDEX: 25.18 KG/M2 | WEIGHT: 185.88 LBS | HEIGHT: 72 IN

## 2019-05-14 DIAGNOSIS — J32.9 CHRONIC SINUSITIS, UNSPECIFIED LOCATION: ICD-10-CM

## 2019-05-14 DIAGNOSIS — R09.89 CHRONIC THROAT CLEARING: ICD-10-CM

## 2019-05-14 DIAGNOSIS — J31.0 CHRONIC RHINITIS: ICD-10-CM

## 2019-05-14 DIAGNOSIS — J45.901 ASTHMA WITH ACUTE EXACERBATION, UNSPECIFIED ASTHMA SEVERITY, UNSPECIFIED WHETHER PERSISTENT: Primary | ICD-10-CM

## 2019-05-14 DIAGNOSIS — J32.9 RECURRENT SINUS INFECTIONS: ICD-10-CM

## 2019-05-14 PROCEDURE — 99999 PR PBB SHADOW E&M-EST. PATIENT-LVL III: CPT | Mod: PBBFAC,,, | Performed by: ALLERGY & IMMUNOLOGY

## 2019-05-14 PROCEDURE — 99244 OFF/OP CNSLTJ NEW/EST MOD 40: CPT | Mod: S$GLB,,, | Performed by: ALLERGY & IMMUNOLOGY

## 2019-05-14 PROCEDURE — 99244 PR OFFICE CONSULTATION,LEVEL IV: ICD-10-PCS | Mod: S$GLB,,, | Performed by: ALLERGY & IMMUNOLOGY

## 2019-05-14 PROCEDURE — 99999 PR PBB SHADOW E&M-EST. PATIENT-LVL III: ICD-10-PCS | Mod: PBBFAC,,, | Performed by: ALLERGY & IMMUNOLOGY

## 2019-05-14 RX ORDER — PREDNISONE 10 MG/1
TABLET ORAL
Qty: 21 TABLET | Refills: 0 | Status: SHIPPED | OUTPATIENT
Start: 2019-05-14 | End: 2019-05-23 | Stop reason: ALTCHOICE
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
## 2019-05-15 ENCOUNTER — TELEPHONE (OUTPATIENT)
Dept: ALLERGY | Facility: CLINIC | Age: 39
End: 2019-05-15

## 2019-05-15 ENCOUNTER — HOSPITAL ENCOUNTER (OUTPATIENT)
Dept: PULMONOLOGY | Facility: CLINIC | Age: 39
Discharge: HOME | End: 2019-05-15
Payer: COMMERCIAL

## 2019-05-15 ENCOUNTER — PATIENT MESSAGE (OUTPATIENT)
Dept: ALLERGY | Facility: CLINIC | Age: 39
End: 2019-05-15

## 2019-05-15 DIAGNOSIS — J45.901 ASTHMA WITH ACUTE EXACERBATION, UNSPECIFIED ASTHMA SEVERITY, UNSPECIFIED WHETHER PERSISTENT: ICD-10-CM

## 2019-05-15 LAB
POST FEV1 FVC: 0.73
POST FEV1: 3.25
POST FVC: 4.45
PRE FEV1 FVC: 71
PRE FEV1: 2.94
PRE FVC: 4.15
PREDICTED FEV1 FVC: 82
PREDICTED FEV1: 4.29
PREDICTED FVC: 5.21

## 2019-05-15 PROCEDURE — 94060 EVALUATION OF WHEEZING: CPT | Mod: S$GLB,,, | Performed by: INTERNAL MEDICINE

## 2019-05-15 PROCEDURE — 94060 PR EVAL OF BRONCHOSPASM: ICD-10-PCS | Mod: S$GLB,,, | Performed by: INTERNAL MEDICINE

## 2019-05-15 NOTE — TELEPHONE ENCOUNTER
----- Message from Tita Potter sent at 5/15/2019  4:39 PM CDT -----  Contact: [phone]  Patient is requesting a call from the office concerning his cat scan for tomorrow. He stated they called him and informed him that they needed a prior auth.    Please advise, thanks

## 2019-05-15 NOTE — TELEPHONE ENCOUNTER
DEMI    I rescheduled his CT to Friday since PA is still pending.    Patient did have PFT done this am.

## 2019-05-16 ENCOUNTER — HOSPITAL ENCOUNTER (OUTPATIENT)
Dept: RADIOLOGY | Facility: HOSPITAL | Age: 39
Discharge: HOME | End: 2019-05-16
Attending: ALLERGY & IMMUNOLOGY
Payer: COMMERCIAL

## 2019-05-16 DIAGNOSIS — J45.901 ASTHMA WITH ACUTE EXACERBATION, UNSPECIFIED ASTHMA SEVERITY, UNSPECIFIED WHETHER PERSISTENT: ICD-10-CM

## 2019-05-16 PROCEDURE — 71046 X-RAY EXAM CHEST 2 VIEWS: CPT | Mod: TC

## 2019-05-16 PROCEDURE — 71046 XR CHEST PA AND LATERAL: ICD-10-PCS | Mod: 26,,, | Performed by: RADIOLOGY

## 2019-05-16 PROCEDURE — 71046 X-RAY EXAM CHEST 2 VIEWS: CPT | Mod: 26,,, | Performed by: RADIOLOGY

## 2019-05-17 ENCOUNTER — HOSPITAL ENCOUNTER (OUTPATIENT)
Dept: RADIOLOGY | Facility: HOSPITAL | Age: 39
Discharge: HOME | End: 2019-05-17
Attending: ALLERGY & IMMUNOLOGY
Payer: COMMERCIAL

## 2019-05-17 DIAGNOSIS — J45.901 ASTHMA WITH ACUTE EXACERBATION, UNSPECIFIED ASTHMA SEVERITY, UNSPECIFIED WHETHER PERSISTENT: ICD-10-CM

## 2019-05-17 PROCEDURE — 70486 CT SINUSES WITHOUT CONTRAST: ICD-10-PCS | Mod: 26,,, | Performed by: RADIOLOGY

## 2019-05-17 PROCEDURE — 70486 CT MAXILLOFACIAL W/O DYE: CPT | Mod: TC

## 2019-05-17 PROCEDURE — 70486 CT MAXILLOFACIAL W/O DYE: CPT | Mod: 26,,, | Performed by: RADIOLOGY

## 2019-05-17 RX ORDER — SULFAMETHOXAZOLE AND TRIMETHOPRIM 800; 160 MG/1; MG/1
1 TABLET ORAL 2 TIMES DAILY
Qty: 60 TABLET | Refills: 1 | Status: SHIPPED | OUTPATIENT
Start: 2019-05-17 | End: 2019-06-24 | Stop reason: SDUPTHER

## 2019-05-23 ENCOUNTER — OFFICE VISIT (OUTPATIENT)
Dept: ALLERGY | Facility: CLINIC | Age: 39
End: 2019-05-23
Payer: COMMERCIAL

## 2019-05-23 VITALS — HEIGHT: 72 IN | BODY MASS INDEX: 24.96 KG/M2 | WEIGHT: 184.31 LBS

## 2019-05-23 DIAGNOSIS — H10.13 ALLERGIC CONJUNCTIVITIS, BILATERAL: ICD-10-CM

## 2019-05-23 DIAGNOSIS — J30.1 SEASONAL ALLERGIC RHINITIS DUE TO POLLEN: ICD-10-CM

## 2019-05-23 DIAGNOSIS — E55.9 VITAMIN D INSUFFICIENCY: ICD-10-CM

## 2019-05-23 DIAGNOSIS — J32.4 CHRONIC PANSINUSITIS: Primary | ICD-10-CM

## 2019-05-23 DIAGNOSIS — J45.20 ASTHMA IN ADULT, MILD INTERMITTENT, UNCOMPLICATED: ICD-10-CM

## 2019-05-23 PROCEDURE — 99214 PR OFFICE/OUTPT VISIT, EST, LEVL IV, 30-39 MIN: ICD-10-PCS | Mod: S$GLB,,, | Performed by: ALLERGY & IMMUNOLOGY

## 2019-05-23 PROCEDURE — 99214 OFFICE O/P EST MOD 30 MIN: CPT | Mod: S$GLB,,, | Performed by: ALLERGY & IMMUNOLOGY

## 2019-05-23 PROCEDURE — 99999 PR PBB SHADOW E&M-EST. PATIENT-LVL II: CPT | Mod: PBBFAC,,, | Performed by: ALLERGY & IMMUNOLOGY

## 2019-05-23 PROCEDURE — 99999 PR PBB SHADOW E&M-EST. PATIENT-LVL II: ICD-10-PCS | Mod: PBBFAC,,, | Performed by: ALLERGY & IMMUNOLOGY

## 2019-05-23 RX ORDER — LORATADINE 10 MG/1
10 TABLET ORAL DAILY
COMMUNITY
End: 2019-08-23

## 2019-05-23 RX ORDER — FLUTICASONE FUROATE AND VILANTEROL 200; 25 UG/1; UG/1
1 POWDER RESPIRATORY (INHALATION) DAILY
Qty: 1 EACH | Refills: 5 | Status: SHIPPED | OUTPATIENT
Start: 2019-05-23 | End: 2020-01-22

## 2019-05-23 NOTE — PROGRESS NOTES
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  He is here alone.  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    After his last visit, he was started on prednisone and within two days his shortness of breath and wheezing had resolved.  He finished his Prednisone several days ago and last night did have some mild chest congestion.    He has not needed any albuterol.    He continues to take Nasonex and Astelin daily.  He changed from Clarinex to Claritin.  He has been taking that daily.    He is taking his vitamin-D.    His sinus CT was abnormal and he was started on Bactrim DS.  He has been taking that for the last week.  He has skipped a few doses.    He does not have any cats or dogs.    He denies any indigestion or heartburn.    He thinks he did have a Pneumovax last year.    [facility] PEQ ALLERGY QUESTIONNAIRE SHORT 5/23/2019   Are you taking any new medications since your last visit? No   Constitution: No changes since my last visit with this provider   Head or facial pain: No changes since my last visit with this provider   Eyes: No symptoms   Ears: No symptoms   Nose: No symptoms   Throat: No symptoms   Sinuses: No symptoms   Lungs: Asthma, Use of inhalers   Skin: No symptoms   Cardiovascular: No symptoms   Gastrointestinal: No symptoms   Genital/ urinary No symptoms   Musculoskeletal: No symptoms   Neurologic: No symptoms   Endocrine: No symptoms   Hematologic: No symptoms     Physical Examination:  General: Well-developed, well-nourished, no acute distress.    Head: No sinus tenderness.  Eyes: Conjunctivae:  No bulbar or palpebral conjunctival injection.  Ears: EAC's clear.  TM's clear.  No pre-auricular nodes.  Nose: Nasal Mucosa:  Pink.  Septum: No apparent deviation.  Turbinates:  No significant edema.  Polyps/Mass:  None visible.  Teeth/Gums:  No bleeding noted.  Oropharynx: No exudates.  Neck: Supple without thyromegaly.  No cervical lymphadenopathy.    Respiratory/Chest: Effort: Good.  Auscultation:  Mild end expiratory wheeze at the left base which cleared with breathing.  Skin: Good turgor.  No urticaria or angioedema.  Neuro/Psych: Oriented x 3.    Laboratory 2019:  CBC:  MCV 81, MCH 26.6.  CMP:  Normal.  Lipid panel:  Cholesterol 173.  PSA:  0.81.  TSH:  0.420.  Hemoglobin A1c:  5.4.  Sed rate:  25.  Vitamin-D Level:  16.    Laboratory 2019:  IgE level:  255.  ImmunoCAP:  Class IV:  Cat.  Class II:  Dog.  IgA:  137.  Ig.  IgM:  105.  Pneumococcal titers:  Borderline protective.    Spirometry 05/15/2019:  Mild (small airways) obstruction. Borderline improvement in airflow after bronchodilator.    Chest x-ray 2019:  Normal.    Sinus CT 2019:    Moderate patchy paranasal sinus disease primarily involving the ethmoids, maxillary sinuses and left sphenoid sinuses detailed above.    Assessment:  1.  Allergic rhinitis.  2.  Allergic conjunctivitis.  3.  Allergic asthma, improved.  4.  Chronic sinusitis.  5.  Nasal septal deviation.  6.  GERD.  7.  Consider LPR.  8.  Vitamin-D insufficiency.  9.  Borderline pneumococcal titers.    Recommendations:  1.  Start Breo one inhalation daily.  2.  Albuterol as needed.  3.  Continue Claritin daily for now.  4.  Continue Astelin for now.  5.  Continue Nasonex.  6.  He will call regarding his Pneumovax.  Consider re-vaccination.  7.  ENT follow-up.  He has a copy of his sinus CT.  8.  Discussed skin testing off antihistamines in the future.  9.  He will be traveling at the end of  for three weeks.  He will follow up in the next several weeks.

## 2019-06-24 RX ORDER — SULFAMETHOXAZOLE AND TRIMETHOPRIM 800; 160 MG/1; MG/1
1 TABLET ORAL 2 TIMES DAILY
Qty: 28 TABLET | Refills: 1 | Status: SHIPPED | OUTPATIENT
Start: 2019-06-24 | End: 2019-08-13 | Stop reason: ALTCHOICE

## 2019-06-24 RX ORDER — PREDNISONE 10 MG/1
TABLET ORAL
Qty: 35 TABLET | Refills: 0 | Status: SHIPPED | OUTPATIENT
Start: 2019-06-24 | End: 2019-08-13 | Stop reason: ALTCHOICE

## 2019-06-24 NOTE — PROGRESS NOTES
He is doing much better after three weeks of Bactrim.  His rhinitis and asthma are well controlled.    He takes Breo on most days.  He has not needed any albuterol.    He continues to do nasal rinses.    He is going to [facility] for three weeks.    He has not yet had ENT follow-up.  He would like for me to schedule this.

## 2019-07-30 ENCOUNTER — OFFICE VISIT (OUTPATIENT)
Dept: OTOLARYNGOLOGY | Facility: CLINIC | Age: 39
End: 2019-07-30
Payer: COMMERCIAL

## 2019-07-30 VITALS
DIASTOLIC BLOOD PRESSURE: 95 MMHG | TEMPERATURE: 98 F | BODY MASS INDEX: 25.47 KG/M2 | SYSTOLIC BLOOD PRESSURE: 143 MMHG | WEIGHT: 187.81 LBS | HEART RATE: 90 BPM

## 2019-07-30 DIAGNOSIS — J34.2 NASAL SEPTAL DEVIATION: ICD-10-CM

## 2019-07-30 DIAGNOSIS — K21.9 LARYNGOPHARYNGEAL REFLUX (LPR): ICD-10-CM

## 2019-07-30 DIAGNOSIS — J34.89 NASAL OBSTRUCTION: ICD-10-CM

## 2019-07-30 DIAGNOSIS — J30.81 ALLERGIC RHINITIS DUE TO ANIMAL HAIR AND DANDER: ICD-10-CM

## 2019-07-30 DIAGNOSIS — J32.8 OTHER CHRONIC SINUSITIS: Primary | ICD-10-CM

## 2019-07-30 DIAGNOSIS — J34.3 HYPERTROPHY OF INFERIOR NASAL TURBINATE: ICD-10-CM

## 2019-07-30 PROCEDURE — 99244 OFF/OP CNSLTJ NEW/EST MOD 40: CPT | Mod: 25,S$GLB,, | Performed by: OTOLARYNGOLOGY

## 2019-07-30 PROCEDURE — 99999 PR PBB SHADOW E&M-EST. PATIENT-LVL III: CPT | Mod: PBBFAC,,, | Performed by: OTOLARYNGOLOGY

## 2019-07-30 PROCEDURE — 31231 PR NASAL ENDOSCOPY, DX: ICD-10-PCS | Mod: S$GLB,,, | Performed by: OTOLARYNGOLOGY

## 2019-07-30 PROCEDURE — 31231 NASAL ENDOSCOPY DX: CPT | Mod: S$GLB,,, | Performed by: OTOLARYNGOLOGY

## 2019-07-30 PROCEDURE — 99999 PR PBB SHADOW E&M-EST. PATIENT-LVL III: ICD-10-PCS | Mod: PBBFAC,,, | Performed by: OTOLARYNGOLOGY

## 2019-07-30 PROCEDURE — 99244 PR OFFICE CONSULTATION,LEVEL IV: ICD-10-PCS | Mod: 25,S$GLB,, | Performed by: OTOLARYNGOLOGY

## 2019-07-30 RX ORDER — LEVOFLOXACIN 500 MG/1
500 TABLET, FILM COATED ORAL DAILY
Qty: 14 TABLET | Refills: 0 | Status: CANCELLED | OUTPATIENT
Start: 2019-07-30 | End: 2019-08-13

## 2019-07-30 RX ORDER — PANTOPRAZOLE SODIUM 40 MG/1
40 TABLET, DELAYED RELEASE ORAL DAILY
Qty: 30 TABLET | Refills: 11 | Status: CANCELLED | OUTPATIENT
Start: 2019-07-30 | End: 2020-07-29

## 2019-07-30 NOTE — PROCEDURES
Procedures     PROCEDURE NOTE:  Nasal endoscopy   Preprocedure diagnosis:  Chronic sinusitis  Postprocedure diangosis:  Same  Complications:  None  Blood Loss:  None    Procedure in detail:  After verbal consent was obtained, the patient's nasal cavity was anesthesized using topical 1%lidocaine and Neosynepherine.  A rigid 0 degree endoscope was placed in first the right, then the left nasal cavity.  The inferior and middle turbinates were examined, and found to be normal bilaterally.  The middle meatus and maxillary antrum was also examined. There was jewel polyposis in bilateral middle meatus obstructing the OMC.  There was a large nasal septal spur abutting the inferior turbinate on the left side.   No purulent drainage or masses seen.  The patient tolerated the procedure well and there were no complications.

## 2019-07-30 NOTE — LETTER
July 30, 2019      JAMES Marx III, MD  [address] [facility]  [place] [address]           [place] - Otorhinolaryngology  [address] [address]  Phone: [phone]  Fax: [phone]          Patient: Miguel Gaffney   MR Number: [id number]   YOB: 1980   Date of Visit: 7/30/2019       Dear Dr. JAMES Marx III:    Thank you for referring Miguel Gaffney to me for evaluation. Attached you will find relevant portions of my assessment and plan of care.    If you have questions, please do not hesitate to call me. I look forward to following Miguel Gaffney along with you.    Sincerely,    Ebony Sosa MD    Enclosure  CC:  No Recipients    If you would like to receive this communication electronically, please contact [email or web address] or [phone] to request more information on [facility] access.    For providers and/or their staff who would like to refer a patient to [facility], please contact us through our one-stop-shop provider referral line, [facility], at [phone].    If you feel you have received this communication in error or would no longer like to receive these types of communications, please e-mail [email or web address]

## 2019-07-30 NOTE — PROGRESS NOTES
Chief Complaint   Patient presents with    Sinusitis     since about december of last year   .    HPI:     Miguel Gaffney is a 39 y.o. male who is referred by Dr. Charlie Marx for evaluation of chronic rhinosinusitis and possible laryngopharyngeal reflux.  He presents with a history of persistent sinonasal symptoms since December of 2018.  He states that he has had persistent nasal congestion, postnasal drip, decreased sense of smell and taste, and mild facial pain and pressure.  Since the onset he has been on 3 rounds Augmentin each 2 week courses, a 6 week course of Bactrim,  and  an additional 2 week course of Bactrim.  He has also been on He feels that his symptoms are somewhat improved when he is on antibiotics but as soon as the antibiotics are completed he feels his symptoms immediately returned.  He also has been on Nasonex and Astelin along with twice daily nasal saline rinses.   He finds this has been somewhat helpful in the congestion experiences.  He also has experienced wheezing during the exacerbations of his chronic sinusitis.  He has been on Breo inhaler which has been helpful for this. He has not had sinus or nasal surgery. There is no history of sinonasal trauma.  He also experiences intermittent heartburn indigestion.  He states he has frequent throat clearing episodes and intermittent dry cough.  He denies hoarseness.  He does note globus sensation remains under this is due to reflux or postnasal drip.        Past Medical History:   Diagnosis Date    Acute bronchiolitis with bronchospasm 3/29/2019    Recurrent sinus infections     Seasonal allergic rhinitis due to pollen     Vitamin D insufficiency 3/30/2019    Recommend Vitamin D 2000 IU daily.     Social History     Socioeconomic History    Marital status:      Spouse name: Liam    Number of children: 2    Years of education: Not on file    Highest education level: Not on file   Occupational History    Not on file    Social Needs    Financial resource strain: Not on file    Food insecurity:     Worry: Not on file     Inability: Not on file    Transportation needs:     Medical: Not on file     Non-medical: Not on file   Tobacco Use    Smoking status: Never Smoker    Smokeless tobacco: Never Used   Substance and Sexual Activity    Alcohol use: Yes     Comment: occasional    Drug use: Not Currently    Sexual activity: Yes     Partners: Female   Lifestyle    Physical activity:     Days per week: Not on file     Minutes per session: Not on file    Stress: Not on file   Relationships    Social connections:     Talks on phone: Not on file     Gets together: Not on file     Attends Rastafari service: Not on file     Active member of club or organization: Not on file     Attends meetings of clubs or organizations: Not on file     Relationship status: Not on file   Other Topics Concern    Not on file   Social History Narrative    Was in [place]. 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    : Liam Horta boys 11 and 7 as of 2019.     History reviewed. No pertinent surgical history.  Family History   Problem Relation Age of Onset    Hypertension Mother     Diabetes Father     Heart disease Father 55           Review of Systems  General: negative for chills, fever or weight loss  Psychological: negative for mood changes or depression  Ophthalmic: negative for blurry vision, photophobia or eye pain  ENT: see HPI  Respiratory: no cough, shortness of breath, or wheezing  Cardiovascular: no chest pain or dyspnea on exertion  Gastrointestinal: no abdominal pain, change in bowel habits, or black/ bloody stools  Musculoskeletal: negative for gait disturbance or muscular weakness  Neurological: no syncope or seizures; no ataxia  Dermatological: negative for puritis,  rash and jaundice  Hematologic/lymphatic: no easy bruising, no new lumps or bumps      Physical Exam:    Vitals:    07/30/19 0756   BP:  (!) 143/95   Pulse: 90   Temp: 97.6 °F (36.4 °C)       Constitutional: Well appearing / communicating without difficutly.  NAD.  Eyes: EOM I Bilaterally  Head/Face: Normocephalic.  Negative paranasal sinus pressure/tenderness.  Salivary glands WNL.  House Brackmann I Bilaterally.    Right Ear: Auricle normal appearance. External Auditory Canal within normal limits,TM w/o masses/lesions/perforations. TM mobility noted.   Left Ear: Auricle normal appearance. External Auditory Canal WNL,TM w/o masses/lesions/perforations. TM mobility noted.  Nose: Nasal septal deviation to the left with inferior spur abutting the inferior and middle turbinate. Inferior Turbinates 3+ bilaterally. No septal perforation. No masses/lesions. External nasal skin appears normal without masses/lesions.  Oral Cavity: Gingiva/lips within normal limits.  Dentition/gingiva healthy appearing. Mucus membranes moist. Floor of mouth soft, no masses palpated. Oral Tongue mobile. Hard Palate appears normal.    Oropharynx: Base of tongue appears normal. No masses/lesions noted. Tonsillar fossa/pharyngeal wall without lesions. Posterior oropharynx WNL.  Soft palate without masses. Midline uvula.   Neck/Lymphatic: No LAD I-VI bilaterally.  No thyromegaly.  No masses noted on exam.    Mirror laryngoscopy/nasopharyngoscopy: Active gag reflex.  Unable to perform.    Neuro/Psychiatric: AOx3.  Normal mood and affect.   Cardiovascular: Normal carotid pulses bilaterally, no increasing jugular venous distention noted at cervical region bilaterally.    Respiratory: Normal respiratory effort, no stridor, no retractions noted.      See separate procedure note for nasal endoscopy/laryngoscopy.    Diagnostic studies reviewed:   Laboratory 2019:  IgE level:  255.  ImmunoCAP:  Class IV:  Cat.  Class II:  Dog.  IgA:  137.  Ig.  IgM:  105.  Pneumococcal titers:  Borderline protective.     Spirometry 05/15/2019:  Mild (small airways) obstruction. Borderline  improvement in airflow after bronchodilator.    CT sinus 5/14/2019:   FINDINGS:  The visualized intracranial structures demonstrate no acute abnormalities.    Frontal sinuses are clear.  Moderate mucosal membrane thickening in the ethmoid sinuses.  Moderate mucosal membrane thickening within the maxillary sinuses with opacification of the maxillary infundibula bilaterally.  Possible small air-fluid level within the left maxillary sinus.  Mucosal membrane thickening measures up to 8 mm in the floor of the left maxillary sinus.  The sphenoethmoid recesses are opacified and there is aerated secretions within sphenoid sinuses on the left.  There is a Ingrid cell on the right.  There is leftward nasal septal deviation and nasal contact spur.  No nasal cavity masses.  No evidence of fracture or bone destruction.  Roof of the ethmoids are symmetric.  Orbits demonstrate no acute abnormalities.  There is mild osseous thickening of the walls of the maxillary sinuses suggesting a chronic component of sinus disease.    Assessment:    ICD-10-CM ICD-9-CM    1. Other chronic sinusitis J32.8 473.8    2. Nasal septal deviation J34.2 470    3. Hypertrophy of inferior nasal turbinate J34.3 478.0    4. Nasal obstruction J34.89 478.19    5. Allergic rhinitis due to animal hair and dander J30.81 477.2    6. Laryngopharyngeal reflux (LPR) K21.9 478.79      The primary encounter diagnosis was Other chronic sinusitis. Diagnoses of Nasal septal deviation, Hypertrophy of inferior nasal turbinate, Nasal obstruction, Allergic rhinitis due to animal hair and dander, and Laryngopharyngeal reflux (LPR) were also pertinent to this visit.      Plan:  No orders of the defined types were placed in this encounter.    Continue Nasonex  Continue Astelin  Continue nasal saline rinses BID  He would benefit from endoscopic sinus surgery and septoplasty for the treatment of his condition.  This would include the ethmoid and maxillary sinuses and would be  bilateral.  Inferior turbinate reduction would be included.  I discussed the risks, benefits and alternatives to surgery with the patient, as well as the expected postoperative course.  I gave him the opportunity to ask questions and I answered all of them.  I provided relevant printed information on his condition for him to review at home.  Same-day discharge is anticipated.  He will need evaluation in the pre-anesthesia clinic. He will also need a CT sinuses with Stealth protocol prior to surgery.  The surgery will be scheduled in the near future.  He will need to return for a postoperative visit 1 week after surgery.    Thank you kindly for allowing me to participate in the patient's care.     Ebony Sosa MD

## 2019-08-07 ENCOUNTER — TELEPHONE (OUTPATIENT)
Dept: OTOLARYNGOLOGY | Facility: CLINIC | Age: 39
End: 2019-08-07

## 2019-08-07 NOTE — TELEPHONE ENCOUNTER
Spoke to patient discussed scheduling surgery on 9/12 with Dr Bazzi. Follow up to sign consents on 8/27 at 3pm and follow up after surgery 9/18 at 8am. Patient was notified of the date and times of the appointments

## 2019-08-07 NOTE — TELEPHONE ENCOUNTER
----- Message from Ebony Sosa MD sent at 7/30/2019  1:12 PM CDT -----  Please schedule Dr. Gaffney for surgery.

## 2019-08-13 ENCOUNTER — TELEPHONE (OUTPATIENT)
Dept: OTOLARYNGOLOGY | Facility: CLINIC | Age: 39
End: 2019-08-13

## 2019-08-13 ENCOUNTER — OFFICE VISIT (OUTPATIENT)
Dept: ALLERGY | Facility: CLINIC | Age: 39
End: 2019-08-13
Payer: COMMERCIAL

## 2019-08-13 VITALS
WEIGHT: 190.06 LBS | SYSTOLIC BLOOD PRESSURE: 118 MMHG | DIASTOLIC BLOOD PRESSURE: 76 MMHG | BODY MASS INDEX: 25.74 KG/M2 | HEIGHT: 72 IN

## 2019-08-13 DIAGNOSIS — J30.81 ALLERGIC RHINITIS DUE TO ANIMAL HAIR AND DANDER: ICD-10-CM

## 2019-08-13 DIAGNOSIS — J32.9 RECURRENT SINUS INFECTIONS: Primary | ICD-10-CM

## 2019-08-13 DIAGNOSIS — J34.2 NASAL SEPTAL DEVIATION: ICD-10-CM

## 2019-08-13 DIAGNOSIS — E55.9 VITAMIN D INSUFFICIENCY: ICD-10-CM

## 2019-08-13 DIAGNOSIS — J34.3 HYPERTROPHY OF INFERIOR NASAL TURBINATE: ICD-10-CM

## 2019-08-13 DIAGNOSIS — J32.8 OTHER CHRONIC SINUSITIS: Primary | ICD-10-CM

## 2019-08-13 PROCEDURE — 99999 PR PBB SHADOW E&M-EST. PATIENT-LVL III: CPT | Mod: PBBFAC,,, | Performed by: ALLERGY & IMMUNOLOGY

## 2019-08-13 PROCEDURE — 99214 PR OFFICE/OUTPT VISIT, EST, LEVL IV, 30-39 MIN: ICD-10-PCS | Mod: S$GLB,,, | Performed by: ALLERGY & IMMUNOLOGY

## 2019-08-13 PROCEDURE — 99214 OFFICE O/P EST MOD 30 MIN: CPT | Mod: S$GLB,,, | Performed by: ALLERGY & IMMUNOLOGY

## 2019-08-13 PROCEDURE — 99999 PR PBB SHADOW E&M-EST. PATIENT-LVL III: ICD-10-PCS | Mod: PBBFAC,,, | Performed by: ALLERGY & IMMUNOLOGY

## 2019-08-14 NOTE — PROGRESS NOTES
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  He is here alone.  He was last seen May 23, 2019.  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    After his last visit, he went to [facility] for three weeks.  While there he did well.  On a sightseeing trip he [place] and did not have any respiratory difficulty.  His O2 sats did fall however.  He did not need any albuterol.    While there he ate a lot of spicy food.  He did have indigestion more often but since returning to the [place] it has been less.  It may be once a week now.    About a week before his return, he developed yellow thick rhinorrhea and started a 10 day course of Bactrim that I had given him for travel.  This has helped.  He is no longer having any discolored secretions.    He does continue to have nasal congestion, postnasal drip, frequent throat clearing, and a sensation that something is in the back of the throat.    He continues to take Nasonex or Flonase, Astelin, and Breo.  He has not needed any Claritin.    He continues to take vitamin-D replacement.    He did see Dr. Ebony Smalls who did not see any evidence of LPR though this was in her diagnosis.  She did see nasal polyps and recommended surgery.  He is considering this.  He is now fairly asymptomatic now and would like to treat with maximal medical therapy and continue to observe.    He does not have any cats or dogs.    He did have either a Pneumovax or Prevnar last year.  He has not yet determined which vaccination he got and when.    [facility] PEQ ALLERGY QUESTIONNAIRE SHORT 8/13/2019   Are you taking any new medications since your last visit? No   Constitution: No symptoms   Head or facial pain: No symptoms   Eyes: No symptoms   Ears: No symptoms   Nose: Nasal polyps, Snoring, Blocked nose   Throat: No symptoms   Sinuses: No symptoms    Lungs: Asthma, Use of inhalers   Skin: No symptoms   Cardiovascular: No symptoms   Gastrointestinal: No symptoms   Genital/ urinary No symptoms   Musculoskeletal: No symptoms   Neurologic: No symptoms   Endocrine: No symptoms   Hematologic: No symptoms     Physical Examination:  General: Well-developed, well-nourished, no acute distress.    Head: No sinus tenderness.  Eyes: Conjunctivae:  No bulbar or palpebral conjunctival injection.  Ears: EAC's clear.  TM's clear.  No pre-auricular nodes.  Nose: Nasal Mucosa:  Pink.  Septum: No apparent deviation.  Turbinates:  No significant edema.  Polyps/Mass:  Bilateral nasal polyposis.  Teeth/Gums:  No bleeding noted.  Oropharynx: No exudates.  Neck: Supple without thyromegaly. No cervical lymphadenopathy.    Respiratory/Chest: Effort: Good.  Auscultation:  Clear.  Skin: Good turgor.  No urticaria or angioedema.  Neuro/Psych: Oriented x 3.    Laboratory 2019:  CBC:  MCV 81, MCH 26.6.  CMP:  Normal.  Lipid panel:  Cholesterol 173.  PSA:  0.81.  TSH:  0.420.  Hemoglobin A1c:  5.4.  Sed rate:  25.  Vitamin-D Level:  16.    Laboratory 2019:  IgE level:  255.  ImmunoCAP:  Class IV:  Cat.  Class II:  Dog.  IgA:  137.  Ig.  IgM:  105.  Pneumococcal titers:  Borderline protective.    Spirometry 05/15/2019:  Mild (small airways) obstruction. Borderline improvement in airflow after bronchodilator.    Chest x-ray 2019:  Normal.    Sinus CT 2019:    Moderate patchy paranasal sinus disease primarily involving the ethmoids, maxillary sinuses and left sphenoid sinuses detailed above.    Assessment:  1.  Allergic rhinitis, controlled.  2.  Allergic conjunctivitis, controlled.  3.  Allergic asthma, controlled.  4.  Chronic sinusitis.  5.  Nasal polyposis.  6.  Nasal septal deviation.  7.  GERD.  8.  Consider LPR.  9.  Vitamin-D insufficiency.  10.  Borderline pneumococcal titers.    Recommendations:  1.  Continue Breo one inhalation daily.  2.  Albuterol as  needed.  3.  Continue Claritin as needed.  4.  Continue Astelin for now.  5.  Continue Nasonex.  6.  He will call regarding his Pneumovax or Prevnar.  Consider re-vaccination with follow-up titers.  7.  Inhalant skin testing off antihistamines including Astelin.  8.  Return to clinic in two weeks.

## 2019-08-16 ENCOUNTER — PATIENT MESSAGE (OUTPATIENT)
Dept: OTOLARYNGOLOGY | Facility: CLINIC | Age: 39
End: 2019-08-16

## 2019-08-19 ENCOUNTER — PATIENT MESSAGE (OUTPATIENT)
Dept: OTOLARYNGOLOGY | Facility: CLINIC | Age: 39
End: 2019-08-19

## 2019-08-23 ENCOUNTER — OFFICE VISIT (OUTPATIENT)
Dept: OTOLARYNGOLOGY | Facility: CLINIC | Age: 39
End: 2019-08-23
Payer: COMMERCIAL

## 2019-08-23 ENCOUNTER — TELEPHONE (OUTPATIENT)
Dept: OTOLARYNGOLOGY | Facility: CLINIC | Age: 39
End: 2019-08-23

## 2019-08-23 VITALS
SYSTOLIC BLOOD PRESSURE: 110 MMHG | DIASTOLIC BLOOD PRESSURE: 88 MMHG | HEART RATE: 91 BPM | HEIGHT: 72 IN | WEIGHT: 186.31 LBS | BODY MASS INDEX: 25.24 KG/M2

## 2019-08-23 DIAGNOSIS — J33.9 NASAL POLYPOSIS: ICD-10-CM

## 2019-08-23 DIAGNOSIS — J34.2 NASAL SEPTAL DEVIATION: ICD-10-CM

## 2019-08-23 DIAGNOSIS — J31.0 CHRONIC RHINITIS: Primary | ICD-10-CM

## 2019-08-23 DIAGNOSIS — J34.3 HYPERTROPHY OF INFERIOR NASAL TURBINATE: ICD-10-CM

## 2019-08-23 DIAGNOSIS — J32.8 OTHER CHRONIC SINUSITIS: ICD-10-CM

## 2019-08-23 PROCEDURE — 31231 NASAL ENDOSCOPY DX: CPT | Mod: S$GLB,,, | Performed by: OTOLARYNGOLOGY

## 2019-08-23 PROCEDURE — 99214 PR OFFICE/OUTPT VISIT, EST, LEVL IV, 30-39 MIN: ICD-10-PCS | Mod: 25,S$GLB,, | Performed by: OTOLARYNGOLOGY

## 2019-08-23 PROCEDURE — 87070 CULTURE OTHR SPECIMN AEROBIC: CPT

## 2019-08-23 PROCEDURE — 87102 FUNGUS ISOLATION CULTURE: CPT

## 2019-08-23 PROCEDURE — 99214 OFFICE O/P EST MOD 30 MIN: CPT | Mod: 25,S$GLB,, | Performed by: OTOLARYNGOLOGY

## 2019-08-23 PROCEDURE — 99999 PR PBB SHADOW E&M-EST. PATIENT-LVL III: ICD-10-PCS | Mod: PBBFAC,,, | Performed by: OTOLARYNGOLOGY

## 2019-08-23 PROCEDURE — 99999 PR PBB SHADOW E&M-EST. PATIENT-LVL III: CPT | Mod: PBBFAC,,, | Performed by: OTOLARYNGOLOGY

## 2019-08-23 PROCEDURE — 87075 CULTR BACTERIA EXCEPT BLOOD: CPT

## 2019-08-23 PROCEDURE — 31231 NASAL/SINUS ENDOSCOPY: ICD-10-PCS | Mod: S$GLB,,, | Performed by: OTOLARYNGOLOGY

## 2019-08-23 NOTE — PROCEDURES
Nasal/sinus endoscopy  Date/Time: 8/23/2019 3:16 PM  Performed by: Charlie Altman MD  Authorized by: Charlie Altman MD     Consent Done?:  Yes (Verbal)  Anesthesia:     Local anesthetic:  Lidocaine 4% and Mike-Synephrine 1/2%    Patient tolerance:  Patient tolerated the procedure well with no immediate complications  Nose:     Procedure Performed:  Nasal Endoscopy  External:      No external nasal deformity  Intranasal:      Mucosa polyps     Mucosa ulcers not present     No mucosa lesions present     Enlarged turbinates     Septum gross deformity  Nasopharynx:      No mucosa lesions     Adenoids not present     Posterior choanae patent     Eustachian tube patent     Leftward septal deviation and spurs x2.  Grade 2 polyp in left middle meatus.  Polyps and mucopurulence in right middle meatus, swabbed for culture.

## 2019-08-23 NOTE — PROGRESS NOTES
Subjective:      Miguel Gaffney is a 39 y.o. male who was self-referred for nasal polyps.    He was in his usual state of health until December 2018, when he developed nasal congestion that was bilateral and worsened progressively with associated postnasal drip, cough and hyposmia.  In addition, he notes recurring episodes of purulent yellow nasal discharge, and has been on 4 or 5 courses of antibiotics, including 2 courses of Augmentin and 2 courses of Bactrim for 4 to 6 weeks at a time.  He is also using Nasonex, Astelin and saline rinse, to no avail.  He has been evaluated by Dr. Marx and found preliminarily to have few environmental allergies other than pet dander.  He consulted with Dr. Bazzi regarding sinus surgery, though has decided to pursue a second opinion.  He denies notable pruritic symptoms or aural fullness.  He additionally describes heartburn on occasion, and it was suggested by the previous consultants that this could be relevant to his condition.    Current sinonasal medications as above.  The last course of antibiotics was last month.  He does not regularly use nasal decongestant sprays.    He recalls previously having allergy testing as above.    He relates a history of asthma-like wheezing that has accompanied these recent symptoms, and is now controlledc with Breo and albuterol as needed.    He relates a history of reflux symptoms which is not currently managed with medication.  He has not previously had an EGD.    He denies a diagnosis of obstructive sleep apnea.     He has not had sinonasal surgery.    He does not recall a prior history of nasal trauma.    SNOT-22 score = 29, NOSE score = 50%, ETDQ-7 score = 2.7    Global QOL = 95%      Past Medical History  He has a past medical history of Acute bronchiolitis with bronchospasm, Recurrent sinus infections, Seasonal allergic rhinitis due to pollen, and Vitamin D insufficiency.    Past Surgical History  He has no past surgical history on  file.    Family History  His family history includes Diabetes in his father; Heart disease (age of onset: 55) in his father; Hypertension in his mother.    Social History  He reports that he has never smoked. He has never used smokeless tobacco. He reports that he drinks alcohol. He reports that he has current or past drug history.    Allergies  He has No Known Allergies.    Medications   He has a current medication list which includes the following prescription(s): azelastine, cholecalciferol (vitamin d3), fluticasone furoate-vilanterol, mometasone, and albuterol.    Review of Systems  Review of Systems   Constitutional: Negative for fatigue, fever and unexpected weight change.   HENT: Negative for congestion, dental problem, ear discharge, ear pain, facial swelling, hearing loss, hoarse voice, nosebleeds, postnasal drip, rhinorrhea, sinus pressure, sore throat, tinnitus, trouble swallowing and voice change.    Eyes: Negative for photophobia, discharge, itching and visual disturbance.   Respiratory: Positive for apnea and cough. Negative for shortness of breath and wheezing.    Cardiovascular: Negative for chest pain and palpitations.   Gastrointestinal: Negative for abdominal pain, nausea and vomiting.   Endocrine: Negative for cold intolerance and heat intolerance.   Genitourinary: Negative for difficulty urinating.   Musculoskeletal: Negative for arthralgias, back pain, myalgias and neck pain.   Skin: Negative for rash.   Allergic/Immunologic: Negative for environmental allergies and food allergies.   Neurological: Negative for dizziness, seizures, syncope, weakness and headaches.   Hematological: Negative for adenopathy. Does not bruise/bleed easily.   Psychiatric/Behavioral: Negative for decreased concentration, dysphoric mood and sleep disturbance. The patient is not nervous/anxious.    All other systems reviewed and are negative.         Objective:     /88   Pulse 91   Ht 6' (1.829 m)   Wt 84.5 kg  (186 lb 4.6 oz)   BMI 25.27 kg/m²        Constitutional:   He appears well-developed. He is cooperative. Normal speech.  No hoarse voice.      Head:  Normocephalic. Salivary glands normal.  Facial strength is normal.      Ears:    Right Ear: No drainage or tenderness. Tympanic membrane is not perforated. Tympanic membrane mobility is normal. No middle ear effusion. No decreased hearing is noted.   Left Ear: No drainage or tenderness. Tympanic membrane is not perforated. Tympanic membrane mobility is normal.  No middle ear effusion. No decreased hearing is noted.     Nose:  Septal deviation present. No mucosal edema, rhinorrhea or polyps. No epistaxis. Turbinate hypertrophy.  Turbinates normal and no turbinate masses.  Right sinus exhibits no maxillary sinus tenderness and no frontal sinus tenderness. Left sinus exhibits no maxillary sinus tenderness and no frontal sinus tenderness.     Mouth/Throat  Oropharynx clear and moist without lesions or asymmetry and normal uvula midline. He does not have dentures. Normal dentition. No oral lesions or mucous membrane lesions. No oropharyngeal exudate or posterior oropharyngeal erythema. Mirror exam not performed due to patient tolerance.  Mirror exam not performed due to patient tolerance.      Neck:  Neck normal without thyromegaly masses, asymmetry, normal tracheal structure, crepitus, and tenderness, thyroid normal, trachea normal and no adenopathy. Normal range of motion present.     He has no cervical adenopathy.     Cardiovascular:   Regular rhythm.      Pulmonary/Chest:   Effort normal.     Psychiatric:   He has a normal mood and affect. His speech is normal and behavior is normal.     Neurological:   No cranial nerve deficit.     Skin:   No rash noted.       Procedure    Nasal endoscopy performed.  See procedure note.        Data Reviewed    WBC (K/uL)   Date Value   03/29/2019 9.32     Eosinophil% (%)   Date Value   03/29/2019 4.7     Eos # (K/uL)   Date Value    03/29/2019 0.4     Platelets (K/uL)   Date Value   03/29/2019 305     Glucose (mg/dL)   Date Value   03/29/2019 88     IgE (IU/mL)   Date Value   05/14/2019 255 (H)     Immunocaps class 4 cats, class 2 dogs, others negative    I independently reviewed the images of the CT sinuses dated 5/17/19. Pertinent findings include predominantly central opacification of OMCs and ethmoids, with relative sparing of frontal and sphenoid and distal maxillaries.       Assessment:     1. Chronic rhinitis    2. Other chronic sinusitis    3. Nasal polyposis    4. Nasal septal deviation    5. Hypertrophy of inferior nasal turbinate         Plan:     I had a long discussion with the patient regarding his condition and the further workup and management options.  He appears to have a variant of CRS with polyposis that is similar to central compartment atopic disease, though with a predominant infectious component due to OMC obstruction with the small polyps.  Given that he is symptomatic and has an ongoing infectious component, I advised that he consider proceeding the surgical management sooner rather than later.  He would benefit from endoscopic sinus surgery and septoplasty for the treatment of his condition.  This would include the ethmoid, maxillary and frontal sinuses and would be bilateral.  Inferior turbinate reduction would be included.  I discussed the risks, benefits and alternatives to surgery with the patient, as well as the expected postoperative course.  I gave him the opportunity to ask questions and I answered all of them.  I provided relevant printed information on his condition for him to review at home.  Same-day discharge is anticipated.  He may have anesthesia triage by telephone.   He will call when he decides to schedule surgery.  He will need to return for a postoperative visit 1 week after surgery.    Follow up for surgery.

## 2019-08-26 ENCOUNTER — PATIENT MESSAGE (OUTPATIENT)
Dept: OTOLARYNGOLOGY | Facility: CLINIC | Age: 39
End: 2019-08-26

## 2019-08-26 ENCOUNTER — TELEPHONE (OUTPATIENT)
Dept: OTOLARYNGOLOGY | Facility: CLINIC | Age: 39
End: 2019-08-26

## 2019-08-26 LAB — BACTERIA SPEC AEROBE CULT: NORMAL

## 2019-08-27 ENCOUNTER — OFFICE VISIT (OUTPATIENT)
Dept: ALLERGY | Facility: CLINIC | Age: 39
End: 2019-08-27
Payer: COMMERCIAL

## 2019-08-27 VITALS
WEIGHT: 188.69 LBS | HEIGHT: 72 IN | BODY MASS INDEX: 25.56 KG/M2 | SYSTOLIC BLOOD PRESSURE: 118 MMHG | DIASTOLIC BLOOD PRESSURE: 82 MMHG

## 2019-08-27 DIAGNOSIS — J32.4 CHRONIC PANSINUSITIS: ICD-10-CM

## 2019-08-27 DIAGNOSIS — J45.20 ASTHMA IN ADULT, MILD INTERMITTENT, UNCOMPLICATED: ICD-10-CM

## 2019-08-27 DIAGNOSIS — J30.89 ALLERGIC RHINITIS DUE TO DUST MITE: Primary | ICD-10-CM

## 2019-08-27 PROCEDURE — 99213 PR OFFICE/OUTPT VISIT, EST, LEVL III, 20-29 MIN: ICD-10-PCS | Mod: 25,S$GLB,, | Performed by: ALLERGY & IMMUNOLOGY

## 2019-08-27 PROCEDURE — 99999 PR PBB SHADOW E&M-EST. PATIENT-LVL III: CPT | Mod: PBBFAC,,, | Performed by: ALLERGY & IMMUNOLOGY

## 2019-08-27 PROCEDURE — 99999 PR PBB SHADOW E&M-EST. PATIENT-LVL III: ICD-10-PCS | Mod: PBBFAC,,, | Performed by: ALLERGY & IMMUNOLOGY

## 2019-08-27 PROCEDURE — 99213 OFFICE O/P EST LOW 20 MIN: CPT | Mod: 25,S$GLB,, | Performed by: ALLERGY & IMMUNOLOGY

## 2019-08-27 PROCEDURE — 95004 PERQ TESTS W/ALRGNC XTRCS: CPT | Mod: S$GLB,,, | Performed by: ALLERGY & IMMUNOLOGY

## 2019-08-27 PROCEDURE — 95004 PR ALLERGY SKIN TESTS,ALLERGENS: ICD-10-PCS | Mod: S$GLB,,, | Performed by: ALLERGY & IMMUNOLOGY

## 2019-08-27 NOTE — PROGRESS NOTES
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  He is here alone.  He was last seen 2019.  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    Since his last visit, he has done well.  He has been taking Nasonex and Astelin that have controlled his rhinitis.      He has not needed any additional antibiotics.      His asthma has been well controlled on Breo.  He has not needed any additional albuterol.    He has not yet found out about his Pneumovax.    Changes in health care status since last visit:  None reported.    Physical Examination:  General: Well-developed, well-nourished, no acute distress.    Head: No sinus tenderness.  Eyes: Conjunctivae:  No bulbar or palpebral conjunctival injection.  Ears: EAC's clear.  TM's clear.  No pre-auricular nodes.  Nose: Nasal Mucosa:  Pink.  Septum: No apparent deviation.  Turbinates:  No significant edema.  Polyps/Mass:  Bilateral nasal polyposis.  Teeth/Gums:  No bleeding noted.  Oropharynx: No exudates.  Neck: Supple without thyromegaly. No cervical lymphadenopathy.    Respiratory/Chest: Effort: Good.  Auscultation:  Clear.  Skin: Good turgor.  No urticaria or angioedema.  Neuro/Psych: Oriented x 3.    Laboratory 2019:  CBC:  MCV 81, MCH 26.6.  CMP:  Normal.  Lipid panel:  Cholesterol 173.  PSA:  0.81.  TSH:  0.420.  Hemoglobin A1c:  5.4.  Sed rate:  25.  Vitamin-D Level:  16.    Laboratory 2019:  IgE level:  255.  ImmunoCAP:  Class IV:  Cat.  Class II:  Dog.  IgA:  137.  Ig.  IgM:  105.  Pneumococcal titers:  Borderline protective.    Spirometry 05/15/2019:  Mild (small airways) obstruction. Borderline improvement in airflow after bronchodilator.    Chest x-ray 2019:  Normal.    Sinus CT 2019:    Moderate patchy paranasal sinus disease primarily involving the ethmoids, maxillary sinuses and left sphenoid sinuses detailed  above.    Inhalant skin tests 08/27/2019:  3+ histamine control.  2+ dust mites.    Assessment:  1.  Allergic rhinitis, controlled.  2.  Allergic conjunctivitis, controlled.  3.  Allergic asthma, controlled.  4.  Chronic sinusitis.  5.  Nasal polyposis.  6.  Nasal septal deviation.  7.  GERD.  8.  Consider LPR.  9.  Vitamin-D insufficiency.  10.  Borderline pneumococcal titers.    Recommendations:  1.  Continue Breo one inhalation daily.  2.  Albuterol as needed.  3.  Continue Claritin as needed.  4.  Continue Astelin for now.  5.  Continue Nasonex.  6.  He will call regarding his Pneumovax or Prevnar.  Consider re-vaccination with follow-up titers.  7.  House dust mite avoidance.  8.  Start Odactra.  9.  Return to clinic in three months or sooner if needed to begin Odactra.

## 2019-08-28 LAB — BACTERIA SPEC ANAEROBE CULT: NORMAL

## 2019-09-04 ENCOUNTER — TELEPHONE (OUTPATIENT)
Dept: PHARMACY | Facility: CLINIC | Age: 39
End: 2019-09-04

## 2019-09-04 NOTE — TELEPHONE ENCOUNTER
Informed Patient  that [facility] received prescription for Odactra and prior authorization is required.  [facility] will be back in touch once insurance determination is received.

## 2019-09-12 DIAGNOSIS — J34.3 HYPERTROPHY OF INFERIOR NASAL TURBINATE: ICD-10-CM

## 2019-09-12 DIAGNOSIS — J33.9 NASAL POLYPOSIS: ICD-10-CM

## 2019-09-12 DIAGNOSIS — J32.8 OTHER CHRONIC SINUSITIS: Primary | ICD-10-CM

## 2019-09-12 DIAGNOSIS — J34.2 DEVIATED NASAL SEPTUM: ICD-10-CM

## 2019-09-19 ENCOUNTER — PATIENT MESSAGE (OUTPATIENT)
Dept: OTOLARYNGOLOGY | Facility: CLINIC | Age: 39
End: 2019-09-19

## 2019-09-24 LAB — FUNGUS SPEC CULT: NORMAL

## 2019-10-29 ENCOUNTER — TELEPHONE (OUTPATIENT)
Dept: ALLERGY | Facility: CLINIC | Age: 39
End: 2019-10-29

## 2019-10-29 NOTE — TELEPHONE ENCOUNTER
----- Message from JAMES Marx III, MD sent at 9/26/2019 11:27 AM CDT -----  Regarding: RE: Odactra Denial  Can you please let him know and see if he has any other insurance?  ----- Message -----  From: Stephany Thomas LPN  Sent: 9/24/2019   8:49 AM CDT  To: JAMES Marx III, MD  Subject: FW: Odactra Denial                               Good morning Dr. Marx,    Odactra has been denied by insurance for the following reason:     Odactra does not meet phamracy coverage guidelines. It is not a covered benefit under the plan.     ----- Message -----  From: JAMES Marx III, MD  Sent: 9/20/2019   1:19 PM CDT  To: Elizabeth A Bosworth, LPN, #  Subject: RE: Odactra Denial                               Stephany, Can you please see if this needs follow up for authorization?    ----- Message -----  From: Elizabeth A Bosworth, LPN  Sent: 9/16/2019   4:40 PM  To: JAMES Marx III, MD, Stephany Thomas LPN  Subject: FW: Odactra Denial                               FYI  ----- Message -----  From: Nhi Veliz, PharmD  Sent: 9/16/2019   3:49 PM  To: Francisco J Guerra III Staff  Subject: Odactra Denial                                   Good Morning,     Odactra has been denied by insurance for the following reason:     Odactra does not meet phamracy coverage guidelines. I is not a covered benefit under the plan.     Please advise how we should move forward.       Thank You,       Nhi Veliz, PharmD     Specialty Pharmacy Clinical Pharmacist   [facility]   P: [phone]

## 2019-10-29 NOTE — TELEPHONE ENCOUNTER
Patient notified of denial from insurance for Odactra. Patient would like to know the cost of this medication if he chooses to pay out of pocket. Please call patient to discuss.     Thank you

## 2019-11-11 ENCOUNTER — PATIENT OUTREACH (OUTPATIENT)
Dept: ADMINISTRATIVE | Facility: OTHER | Age: 39
End: 2019-11-11

## 2019-11-11 ENCOUNTER — OFFICE VISIT (OUTPATIENT)
Dept: ALLERGY | Facility: CLINIC | Age: 39
End: 2019-11-11
Payer: COMMERCIAL

## 2019-11-11 VITALS
SYSTOLIC BLOOD PRESSURE: 124 MMHG | HEIGHT: 72 IN | WEIGHT: 188.5 LBS | BODY MASS INDEX: 25.53 KG/M2 | DIASTOLIC BLOOD PRESSURE: 90 MMHG | HEART RATE: 100 BPM | OXYGEN SATURATION: 95 %

## 2019-11-11 DIAGNOSIS — J40 BRONCHITIS: ICD-10-CM

## 2019-11-11 DIAGNOSIS — J33.9 NASAL POLYPS: ICD-10-CM

## 2019-11-11 DIAGNOSIS — J30.81 ALLERGIC RHINITIS DUE TO ANIMAL HAIR AND DANDER: ICD-10-CM

## 2019-11-11 DIAGNOSIS — J45.21 MILD INTERMITTENT ASTHMA WITH ACUTE EXACERBATION: Primary | ICD-10-CM

## 2019-11-11 PROCEDURE — 99214 OFFICE O/P EST MOD 30 MIN: CPT | Mod: S$GLB,,, | Performed by: ALLERGY & IMMUNOLOGY

## 2019-11-11 PROCEDURE — 99214 PR OFFICE/OUTPT VISIT, EST, LEVL IV, 30-39 MIN: ICD-10-PCS | Mod: S$GLB,,, | Performed by: ALLERGY & IMMUNOLOGY

## 2019-11-11 PROCEDURE — 99999 PR PBB SHADOW E&M-EST. PATIENT-LVL III: ICD-10-PCS | Mod: PBBFAC,,, | Performed by: ALLERGY & IMMUNOLOGY

## 2019-11-11 PROCEDURE — 99999 PR PBB SHADOW E&M-EST. PATIENT-LVL III: CPT | Mod: PBBFAC,,, | Performed by: ALLERGY & IMMUNOLOGY

## 2019-11-11 RX ORDER — AMOXICILLIN AND CLAVULANATE POTASSIUM 875; 125 MG/1; MG/1
1 TABLET, FILM COATED ORAL EVERY 12 HOURS
Qty: 20 TABLET | Refills: 1 | Status: SHIPPED | OUTPATIENT
Start: 2019-11-11 | End: 2019-11-22 | Stop reason: SDUPTHER

## 2019-11-11 RX ORDER — AMOXICILLIN AND CLAVULANATE POTASSIUM 875; 125 MG/1; MG/1
1 TABLET, FILM COATED ORAL EVERY 12 HOURS
Qty: 20 TABLET | Refills: 1 | Status: CANCELLED | OUTPATIENT
Start: 2019-11-11

## 2019-11-11 RX ORDER — PREDNISONE 10 MG/1
TABLET ORAL
Qty: 21 TABLET | Refills: 0 | Status: SHIPPED | OUTPATIENT
Start: 2019-11-11 | End: 2019-12-13 | Stop reason: ALTCHOICE

## 2019-11-11 NOTE — PROGRESS NOTES
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  He is here alone.  He was last seen August 27, 2019.  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    After his last visit, he did well until recently.  About two weeks ago he got his flu shot.  He then developed increased rhinitis and body had a cold.  His symptoms increase this past week in with sneezing, clear to yellow rhinorrhea, and cough productive of yellow sputum.  He then developed increased shortness of breath and wheezing.  He was worse today.    He has been using Breo.  He has not taking any albuterol.    He has also been using Nasonex daily.  He has been doing his sinus rinses twice a day.    He does not know about his Pneumovax.    He will be changing insurance in January.    [facility] PEQ ALLERGY QUESTIONNAIRE SHORT 11/11/2019   Are you taking any new medications since your last visit? No   Constitution: Fatigue   Head or facial pain: Headaches, Sinus pressure   Eyes: No symptoms   Ears: No symptoms   Nose: Sniffling, Sneezing, Runny nose, Blocked nose   Throat: Frequent clearing, Reflux/ heartburn   Sinuses: No symptoms   Lungs: Cough, Wheezing, Use of inhalers, Chest tightness   Skin: No symptoms   Cardiovascular: No symptoms   Gastrointestinal: No symptoms   Genital/ urinary No symptoms   Musculoskeletal: No symptoms   Neurologic: No symptoms   Endocrine: No symptoms   Hematologic: No symptoms     Physical Examination:  General: Well-developed, well-nourished, no acute distress.    Head: No sinus tenderness.  Eyes: Conjunctivae:  No bulbar or palpebral conjunctival injection.  Ears: EAC's clear.  TM's clear.  No pre-auricular nodes.  Nose: Nasal Mucosa:  Pink.  Septum: No apparent deviation.  Turbinates:  No significant edema.  Polyps/Mass:  Bilateral nasal polyposis.  Teeth/Gums:  No bleeding noted.  Oropharynx: No exudates.  Neck:  Supple without thyromegaly. No cervical lymphadenopathy.    Respiratory/Chest: Effort: Good.  Auscultation:  Bilateral expiratory wheezing.  Skin: Good turgor.  No urticaria or angioedema.  Neuro/Psych: Oriented x 3.    Laboratory 2019:  CBC:  MCV 81, MCH 26.6.  CMP:  Normal.  Lipid panel:  Cholesterol 173.  PSA:  0.81.  TSH:  0.420.  Hemoglobin A1c:  5.4.  Sed rate:  25.  Vitamin-D Level:  16.    Laboratory 2019:  IgE level:  255.  ImmunoCAP:  Class IV:  Cat.  Class II:  Dog.  IgA:  137.  Ig.  IgM:  105.  Pneumococcal titers:  Borderline protective.    Spirometry 05/15/2019:  Mild (small airways) obstruction. Borderline improvement in airflow after bronchodilator.    Chest x-ray 2019:  Normal.    Sinus CT 2019:    Moderate patchy paranasal sinus disease primarily involving the ethmoids, maxillary sinuses and left sphenoid sinuses detailed above.    Inhalant skin tests 2019:  3+ histamine control.  2+ dust mites.  Cat and dog not tested.    Assessment:  1.  Allergic rhinitis, controlled.  2.  Allergic conjunctivitis, controlled.  3.  Allergic asthma, not controlled.  4.  Chronic sinusitis.  5.  Nasal polyposis.  6.  Nasal septal deviation.  7.  GERD.  8.  Consider LPR.  9.  Vitamin-D insufficiency.  10.  Borderline pneumococcal titers.  11.  Acute bronchitis.    Recommendations:  1.  At Augmentin 875 b.i.d. for 10 days.  2.  Prednisone taper over six days.  3.  Continue other medications.  4.  He will find out about his Pneumovax or Prevnar.  5.  Consider SLIT.  6.  He will follow-up with me via text.

## 2019-11-22 RX ORDER — AMOXICILLIN AND CLAVULANATE POTASSIUM 875; 125 MG/1; MG/1
1 TABLET, FILM COATED ORAL EVERY 12 HOURS
Qty: 20 TABLET | Refills: 1 | Status: SHIPPED | OUTPATIENT
Start: 2019-11-22 | End: 2019-12-13 | Stop reason: ALTCHOICE

## 2019-11-24 ENCOUNTER — PATIENT MESSAGE (OUTPATIENT)
Dept: OTOLARYNGOLOGY | Facility: CLINIC | Age: 39
End: 2019-11-24

## 2019-11-24 DIAGNOSIS — J32.8 OTHER CHRONIC SINUSITIS: Primary | ICD-10-CM

## 2019-11-24 DIAGNOSIS — J33.9 NASAL POLYPOSIS: ICD-10-CM

## 2019-11-25 ENCOUNTER — PATIENT MESSAGE (OUTPATIENT)
Dept: OTOLARYNGOLOGY | Facility: CLINIC | Age: 39
End: 2019-11-25

## 2019-11-26 ENCOUNTER — HOSPITAL ENCOUNTER (OUTPATIENT)
Dept: RADIOLOGY | Facility: HOSPITAL | Age: 39
Discharge: HOME | End: 2019-11-26
Attending: OTOLARYNGOLOGY
Payer: COMMERCIAL

## 2019-11-26 DIAGNOSIS — J33.9 NASAL POLYPOSIS: ICD-10-CM

## 2019-11-26 PROCEDURE — 70486 CT MAXILLOFACIAL W/O DYE: CPT | Mod: TC

## 2019-11-26 PROCEDURE — 70486 CT MAXILLOFACIAL W/O DYE: CPT | Mod: 26,,, | Performed by: RADIOLOGY

## 2019-11-26 PROCEDURE — 70486 CT MEDTRONIC SINUSES WITHOUT: ICD-10-PCS | Mod: 26,,, | Performed by: RADIOLOGY

## 2019-11-27 ENCOUNTER — PATIENT MESSAGE (OUTPATIENT)
Dept: OTOLARYNGOLOGY | Facility: CLINIC | Age: 39
End: 2019-11-27

## 2019-11-29 ENCOUNTER — PATIENT MESSAGE (OUTPATIENT)
Dept: OTOLARYNGOLOGY | Facility: CLINIC | Age: 39
End: 2019-11-29

## 2019-12-02 ENCOUNTER — PATIENT MESSAGE (OUTPATIENT)
Dept: OTOLARYNGOLOGY | Facility: CLINIC | Age: 39
End: 2019-12-02

## 2019-12-03 ENCOUNTER — PATIENT OUTREACH (OUTPATIENT)
Dept: ADMINISTRATIVE | Facility: OTHER | Age: 39
End: 2019-12-03

## 2019-12-03 ENCOUNTER — TELEPHONE (OUTPATIENT)
Dept: OTOLARYNGOLOGY | Facility: CLINIC | Age: 39
End: 2019-12-03

## 2019-12-03 DIAGNOSIS — J34.3 HYPERTROPHY OF INFERIOR NASAL TURBINATE: ICD-10-CM

## 2019-12-03 DIAGNOSIS — J33.9 NASAL POLYPOSIS: ICD-10-CM

## 2019-12-03 DIAGNOSIS — J32.8 OTHER CHRONIC SINUSITIS: ICD-10-CM

## 2019-12-03 DIAGNOSIS — J31.0 CHRONIC RHINITIS: Primary | ICD-10-CM

## 2019-12-03 DIAGNOSIS — J34.2 NASAL SEPTAL DEVIATION: ICD-10-CM

## 2019-12-04 ENCOUNTER — OFFICE VISIT (OUTPATIENT)
Dept: OTOLARYNGOLOGY | Facility: CLINIC | Age: 39
End: 2019-12-04
Payer: COMMERCIAL

## 2019-12-04 ENCOUNTER — CLINICAL SUPPORT (OUTPATIENT)
Dept: AUDIOLOGY | Facility: CLINIC | Age: 39
End: 2019-12-04
Payer: COMMERCIAL

## 2019-12-04 VITALS — DIASTOLIC BLOOD PRESSURE: 86 MMHG | HEART RATE: 89 BPM | SYSTOLIC BLOOD PRESSURE: 133 MMHG

## 2019-12-04 DIAGNOSIS — Z01.10 ENCOUNTER FOR HEARING EXAMINATION, UNSPECIFIED WHETHER ABNORMAL FINDINGS: Primary | ICD-10-CM

## 2019-12-04 DIAGNOSIS — H93.12 TINNITUS OF LEFT EAR: ICD-10-CM

## 2019-12-04 DIAGNOSIS — J32.4 CHRONIC PANSINUSITIS: ICD-10-CM

## 2019-12-04 DIAGNOSIS — H65.02 NON-RECURRENT ACUTE SEROUS OTITIS MEDIA OF LEFT EAR: Primary | ICD-10-CM

## 2019-12-04 DIAGNOSIS — J34.3 HYPERTROPHY OF INFERIOR NASAL TURBINATE: ICD-10-CM

## 2019-12-04 DIAGNOSIS — J31.0 CHRONIC RHINITIS: ICD-10-CM

## 2019-12-04 DIAGNOSIS — J33.9 NASAL POLYPOSIS: ICD-10-CM

## 2019-12-04 DIAGNOSIS — H90.12 CONDUCTIVE HEARING LOSS OF LEFT EAR WITH UNRESTRICTED HEARING OF RIGHT EAR: Primary | ICD-10-CM

## 2019-12-04 DIAGNOSIS — J34.2 NASAL SEPTAL DEVIATION: ICD-10-CM

## 2019-12-04 PROCEDURE — 99999 PR PBB SHADOW E&M-EST. PATIENT-LVL I: ICD-10-PCS | Mod: PBBFAC,,,

## 2019-12-04 PROCEDURE — 92557 COMPREHENSIVE HEARING TEST: CPT | Mod: S$GLB,,, | Performed by: AUDIOLOGIST

## 2019-12-04 PROCEDURE — 92567 TYMPANOMETRY: CPT | Mod: S$GLB,,, | Performed by: AUDIOLOGIST

## 2019-12-04 PROCEDURE — 92557 PR COMPREHENSIVE HEARING TEST: ICD-10-PCS | Mod: S$GLB,,, | Performed by: AUDIOLOGIST

## 2019-12-04 PROCEDURE — 99214 OFFICE O/P EST MOD 30 MIN: CPT | Mod: S$GLB,,, | Performed by: OTOLARYNGOLOGY

## 2019-12-04 PROCEDURE — 92567 PR TYMPA2METRY: ICD-10-PCS | Mod: S$GLB,,, | Performed by: AUDIOLOGIST

## 2019-12-04 PROCEDURE — 99214 PR OFFICE/OUTPT VISIT, EST, LEVL IV, 30-39 MIN: ICD-10-PCS | Mod: S$GLB,,, | Performed by: OTOLARYNGOLOGY

## 2019-12-04 PROCEDURE — 99999 PR PBB SHADOW E&M-EST. PATIENT-LVL III: ICD-10-PCS | Mod: PBBFAC,,, | Performed by: OTOLARYNGOLOGY

## 2019-12-04 PROCEDURE — 99999 PR PBB SHADOW E&M-EST. PATIENT-LVL III: CPT | Mod: PBBFAC,,, | Performed by: OTOLARYNGOLOGY

## 2019-12-04 PROCEDURE — 99999 PR PBB SHADOW E&M-EST. PATIENT-LVL I: CPT | Mod: PBBFAC,,,

## 2019-12-04 NOTE — H&P (VIEW-ONLY)
Subjective:      Miguel is a 39 y.o. male who comes for follow-up of sinusitis.  His last visit with me was on 8/23/2019.  10 days ago began with acute fullness and popping of both ears, subsided on right but persisted on left, with tinnitus and muffled hearing.  Ongoing bilateral nasal congestion, facial pressure, hyposmia.  Additional Augmentin and medrol dose pack per Dr. Marx, completed right before onset of ear symptoms.    QOL assessment deferred.    The patient's medications, allergies, past medical, surgical, social and family histories were reviewed and updated as appropriate.    A detailed review of systems was obtained with pertinent positives as per the above HPI, and otherwise negative.        Objective:     /86   Pulse 89        Constitutional:   He appears well-developed. He is cooperative. Normal speech.  No hoarse voice.      Head:  Normocephalic. Salivary glands normal.  Facial strength is normal.      Ears:    Right Ear: No drainage or tenderness. Tympanic membrane is not perforated. Tympanic membrane mobility is normal. No middle ear effusion. No decreased hearing is noted.   Left Ear: No drainage or tenderness. Tympanic membrane is retracted. Tympanic membrane is not perforated. Tympanic membrane mobility is abnormal. A middle ear effusion is present. No decreased hearing is noted.     Nose:  Mucosal edema, septal deviation and polyps present. No rhinorrhea. No epistaxis. Turbinate hypertrophy.  Turbinates normal and no turbinate masses.  Right sinus exhibits no maxillary sinus tenderness and no frontal sinus tenderness. Left sinus exhibits no maxillary sinus tenderness and no frontal sinus tenderness.     Mouth/Throat  Oropharynx clear and moist without lesions or asymmetry and normal uvula midline. He does not have dentures. Normal dentition. No oral lesions or mucous membrane lesions. No oropharyngeal exudate or posterior oropharyngeal erythema. Mirror exam not performed due to  patient tolerance.  Mirror exam not performed due to patient tolerance.      Neck:  Neck normal without thyromegaly masses, asymmetry, normal tracheal structure, crepitus, and tenderness, thyroid normal, trachea normal and no adenopathy. Normal range of motion present.     He has no cervical adenopathy.     Cardiovascular:   Regular rhythm.      Pulmonary/Chest:   Effort normal.     Psychiatric:   He has a normal mood and affect. His speech is normal and behavior is normal.     Neurological:   No cranial nerve deficit.     Skin:   No rash noted.       Procedure    None        Data Reviewed    WBC (K/uL)   Date Value   03/29/2019 9.32     Eosinophil% (%)   Date Value   03/29/2019 4.7     Eos # (K/uL)   Date Value   03/29/2019 0.4     Platelets (K/uL)   Date Value   03/29/2019 305     Glucose (mg/dL)   Date Value   03/29/2019 88     IgE (IU/mL)   Date Value   05/14/2019 255 (H)       I independently reviewed the images of the CT sinuses dated 11/26/19. Pertinent findings include partial-to-complete opacification of all sinsues with central hyperdensities.    I independently reviewed the tracings of the complete audiometric evaluation performed today.  I reviewed the audiogram with the patient as well.  Pertinent findings include air-bone gap in left ear with type C tymp on left, right side wnl.        Assessment:     1. Non-recurrent acute serous otitis media of left ear    2. Chronic rhinitis    3. Chronic pansinusitis    4. Nasal polyposis    5. Nasal septal deviation    6. Hypertrophy of inferior nasal turbinate         Plan:     He would benefit from endoscopic sinus surgery and septoplasty for the treatment of his condition.  This would include all sinuses and would be bilateral.  Inferior turbinate reduction would be included.  If the left ear effusion persists, then we would consider myringotomy at the time of the surgery as well.  I discussed the risks, benefits and alternatives to surgery with the patient, as  well as the expected postoperative course.  I gave him the opportunity to ask questions and I answered all of them.  I provided relevant printed information on his condition for him to review at home.  Same-day discharge is anticipated.  He may have anesthesia triage by telephone.   The surgery will be tentatively scheduled for December 16.  He will need to return for a postoperative visit 1 week after surgery.    Follow up for surgery.

## 2019-12-04 NOTE — PROGRESS NOTES
Audiologic Evaluation    Miguel Gaffney was seen on the above date for a hearing evaluation. Miguel Gaffney reports decreased hearing sensitivity, tinnitus and aural fullness in the left ear. Miguel Gaffney denies dizziness.    Tympanometry indicated normal middle ear pressure, tympanic membrane compliance and ear canal volume (type A tympanogram) in the right ear and significant negative middle ear pressure with normal tympanic membrane compliance and ear canal volume (type C tympanogram) in the left ear.     Audiometric testing via insert ear phones indicated normal hearing sensitivity for 250-8000 Hz in the right ear and a mild to moderate conductive hearing loss for 250-8000 Hz in the left ear. Pure tone average and speech recognition threshold were in good agreement. Excellent speech discrimination ability was demonstrated in quiet when novel words were presented at a conversational level in the right ear and an amplified level in the left ear.      Recommendations:  1) Otologic consultation.  2) Repeat audiometric testing to monitor hearing sensitivity following medical intervention.  3) Hearing protection in the presence of excessive noise.

## 2019-12-13 ENCOUNTER — TELEPHONE (OUTPATIENT)
Dept: OTOLARYNGOLOGY | Facility: CLINIC | Age: 39
End: 2019-12-13

## 2019-12-13 NOTE — PRE-PROCEDURE INSTRUCTIONS
PREOP INSTRUCTIONS:No solid food ,milk or milk products for 8 hours prior to procedure.Clear liquids are allowed up to 2 hours before procedure.Clear liquids are:water,apple juice,pedialyte,gatorade,& jello.Shower instructions as well as directions to the [facility] were given.Patient encouraged to wear loose fitting,comfortable clothing.Medication instructions for pm prior to and am of procedure reviewed.Instructed patient to avoid taking vitamins,supplements,aspirin and ibuprofen the morning of surgery.Patient stated an understanding.    Patient denies any familial side effects or issues with anesthesia or sedation.This is the patient's first surgery.    Patient does not know arrival time.Explained that this information comes from the surgeon's office and if they haven't heard from them by 2 or 3 pm to call the office.Patient stated an understanding.Patient stated that his wife would accompany him to the hospital on Monday.

## 2019-12-16 ENCOUNTER — ANESTHESIA (OUTPATIENT)
Dept: SURGERY | Facility: HOSPITAL | Age: 39
End: 2019-12-16
Payer: COMMERCIAL

## 2019-12-16 ENCOUNTER — ANESTHESIA EVENT (OUTPATIENT)
Dept: SURGERY | Facility: HOSPITAL | Age: 39
End: 2019-12-16
Payer: COMMERCIAL

## 2019-12-16 ENCOUNTER — HOSPITAL ENCOUNTER (OUTPATIENT)
Facility: HOSPITAL | Age: 39
Discharge: HOME | End: 2019-12-16
Attending: OTOLARYNGOLOGY | Admitting: OTOLARYNGOLOGY
Payer: COMMERCIAL

## 2019-12-16 VITALS
HEIGHT: 72 IN | SYSTOLIC BLOOD PRESSURE: 117 MMHG | TEMPERATURE: 98 F | WEIGHT: 180 LBS | RESPIRATION RATE: 18 BRPM | OXYGEN SATURATION: 97 % | BODY MASS INDEX: 24.38 KG/M2 | HEART RATE: 82 BPM | DIASTOLIC BLOOD PRESSURE: 80 MMHG

## 2019-12-16 DIAGNOSIS — J32.9 SINUSITIS: Primary | ICD-10-CM

## 2019-12-16 PROCEDURE — 63600175 PHARM REV CODE 636 W HCPCS: Performed by: STUDENT IN AN ORGANIZED HEALTH CARE EDUCATION/TRAINING PROGRAM

## 2019-12-16 PROCEDURE — 31259 NSL/SINS NDSC SPHN TISS RMVL: CPT | Mod: 50,,, | Performed by: OTOLARYNGOLOGY

## 2019-12-16 PROCEDURE — 25000003 PHARM REV CODE 250: Performed by: NURSE ANESTHETIST, CERTIFIED REGISTERED

## 2019-12-16 PROCEDURE — 25000003 PHARM REV CODE 250: Performed by: STUDENT IN AN ORGANIZED HEALTH CARE EDUCATION/TRAINING PROGRAM

## 2019-12-16 PROCEDURE — 63600175 PHARM REV CODE 636 W HCPCS: Performed by: OTOLARYNGOLOGY

## 2019-12-16 PROCEDURE — 37000008 HC ANESTHESIA 1ST 15 MINUTES: Performed by: OTOLARYNGOLOGY

## 2019-12-16 PROCEDURE — 31267 ENDOSCOPY MAXILLARY SINUS: CPT | Mod: 50,51,, | Performed by: OTOLARYNGOLOGY

## 2019-12-16 PROCEDURE — 36000711: Performed by: OTOLARYNGOLOGY

## 2019-12-16 PROCEDURE — C2625 STENT, NON-COR, TEM W/DEL SY: HCPCS | Performed by: OTOLARYNGOLOGY

## 2019-12-16 PROCEDURE — 36000710: Performed by: OTOLARYNGOLOGY

## 2019-12-16 PROCEDURE — 31259 PR ENDOSCOPY, NASAL/SINUS, W/ETHMOIDECTOMY/SPHENOIDOTOMY/TISS REMVL: ICD-10-PCS | Mod: 50,,, | Performed by: OTOLARYNGOLOGY

## 2019-12-16 PROCEDURE — 30140 RESECT INFERIOR TURBINATE: CPT | Mod: 50,51,, | Performed by: OTOLARYNGOLOGY

## 2019-12-16 PROCEDURE — 31276 NSL/SINS NDSC FRNT TISS RMVL: CPT | Mod: 50,51,, | Performed by: OTOLARYNGOLOGY

## 2019-12-16 PROCEDURE — 37000009 HC ANESTHESIA EA ADD 15 MINS: Performed by: OTOLARYNGOLOGY

## 2019-12-16 PROCEDURE — 88304 TISSUE EXAM BY PATHOLOGIST: CPT | Mod: 26,,, | Performed by: PATHOLOGY

## 2019-12-16 PROCEDURE — 87075 CULTR BACTERIA EXCEPT BLOOD: CPT

## 2019-12-16 PROCEDURE — 87102 FUNGUS ISOLATION CULTURE: CPT

## 2019-12-16 PROCEDURE — 71000015 HC POSTOP RECOV 1ST HR: Performed by: OTOLARYNGOLOGY

## 2019-12-16 PROCEDURE — D9220A PRA ANESTHESIA: Mod: ,,, | Performed by: ANESTHESIOLOGY

## 2019-12-16 PROCEDURE — 71000033 HC RECOVERY, INTIAL HOUR: Performed by: OTOLARYNGOLOGY

## 2019-12-16 PROCEDURE — 63600175 PHARM REV CODE 636 W HCPCS: Performed by: NURSE ANESTHETIST, CERTIFIED REGISTERED

## 2019-12-16 PROCEDURE — 87076 CULTURE ANAEROBE IDENT EACH: CPT

## 2019-12-16 PROCEDURE — D9220A PRA ANESTHESIA: ICD-10-PCS | Mod: ,,, | Performed by: ANESTHESIOLOGY

## 2019-12-16 PROCEDURE — 30520 REPAIR OF NASAL SEPTUM: CPT | Mod: 51,,, | Performed by: OTOLARYNGOLOGY

## 2019-12-16 PROCEDURE — 30520 PR REPAIR, NASAL SEPTUM: ICD-10-PCS | Mod: 51,,, | Performed by: OTOLARYNGOLOGY

## 2019-12-16 PROCEDURE — 31267 PR NASAL/SINUS ENDOSCOPY,RMV TISS MAXILL SINUS: ICD-10-PCS | Mod: 50,51,, | Performed by: OTOLARYNGOLOGY

## 2019-12-16 PROCEDURE — 71000016 HC POSTOP RECOV ADDL HR: Performed by: OTOLARYNGOLOGY

## 2019-12-16 PROCEDURE — 25000003 PHARM REV CODE 250: Performed by: OTOLARYNGOLOGY

## 2019-12-16 PROCEDURE — 61782 PR STEREOTACTIC COMP ASSIST PROC,CRANIAL,EXTRADURAL: ICD-10-PCS | Mod: ,,, | Performed by: OTOLARYNGOLOGY

## 2019-12-16 PROCEDURE — 88304 TISSUE EXAM BY PATHOLOGIST: CPT | Performed by: PATHOLOGY

## 2019-12-16 PROCEDURE — 87070 CULTURE OTHR SPECIMN AEROBIC: CPT

## 2019-12-16 PROCEDURE — 88304 PR  SURG PATH,LEVEL III: ICD-10-PCS | Mod: 26,,, | Performed by: PATHOLOGY

## 2019-12-16 PROCEDURE — 31276 PR NASAL/SINUS ENDOSCOPY,EXPLOR FRONTAL SINUS: ICD-10-PCS | Mod: 50,51,, | Performed by: OTOLARYNGOLOGY

## 2019-12-16 PROCEDURE — 94761 N-INVAS EAR/PLS OXIMETRY MLT: CPT

## 2019-12-16 PROCEDURE — 27201423 OPTIME MED/SURG SUP & DEVICES STERILE SUPPLY: Performed by: OTOLARYNGOLOGY

## 2019-12-16 PROCEDURE — 30140 PR EXCISION TURBINATE,SUBMUCOUS: ICD-10-PCS | Mod: 50,51,, | Performed by: OTOLARYNGOLOGY

## 2019-12-16 PROCEDURE — 25000003 PHARM REV CODE 250: Performed by: ANESTHESIOLOGY

## 2019-12-16 PROCEDURE — 61782 SCAN PROC CRANIAL EXTRA: CPT | Mod: ,,, | Performed by: OTOLARYNGOLOGY

## 2019-12-16 DEVICE — IMPLANT PROPEL MOMETASONE: Type: IMPLANTABLE DEVICE | Site: NOSE | Status: FUNCTIONAL

## 2019-12-16 RX ORDER — SODIUM CHLORIDE 0.9 % (FLUSH) 0.9 %
10 SYRINGE (ML) INJECTION
Status: DISCONTINUED | OUTPATIENT
Start: 2019-12-16 | End: 2019-12-16 | Stop reason: HOSPADM

## 2019-12-16 RX ORDER — ONDANSETRON 8 MG/1
8 TABLET, ORALLY DISINTEGRATING ORAL EVERY 6 HOURS PRN
Status: DISCONTINUED | OUTPATIENT
Start: 2019-12-16 | End: 2019-12-16 | Stop reason: HOSPADM

## 2019-12-16 RX ORDER — HYDROCODONE BITARTRATE AND ACETAMINOPHEN 7.5; 325 MG/1; MG/1
1 TABLET ORAL EVERY 4 HOURS PRN
Qty: 15 TABLET | Refills: 0 | Status: SHIPPED | OUTPATIENT
Start: 2019-12-16 | End: 2019-12-27 | Stop reason: SDUPTHER

## 2019-12-16 RX ORDER — IBUPROFEN 600 MG/1
600 TABLET ORAL EVERY 6 HOURS PRN
Qty: 30 TABLET | Refills: 0 | Status: SHIPPED | OUTPATIENT
Start: 2019-12-16 | End: 2019-12-16 | Stop reason: SDUPTHER

## 2019-12-16 RX ORDER — DEXMEDETOMIDINE HYDROCHLORIDE 100 UG/ML
INJECTION, SOLUTION INTRAVENOUS
Status: DISCONTINUED | OUTPATIENT
Start: 2019-12-16 | End: 2019-12-16

## 2019-12-16 RX ORDER — SUCCINYLCHOLINE CHLORIDE 20 MG/ML
INJECTION INTRAMUSCULAR; INTRAVENOUS
Status: DISCONTINUED | OUTPATIENT
Start: 2019-12-16 | End: 2019-12-16

## 2019-12-16 RX ORDER — ACETAMINOPHEN 10 MG/ML
INJECTION, SOLUTION INTRAVENOUS
Status: DISCONTINUED | OUTPATIENT
Start: 2019-12-16 | End: 2019-12-16

## 2019-12-16 RX ORDER — ROCURONIUM BROMIDE 10 MG/ML
INJECTION, SOLUTION INTRAVENOUS
Status: DISCONTINUED | OUTPATIENT
Start: 2019-12-16 | End: 2019-12-16

## 2019-12-16 RX ORDER — MIDAZOLAM HYDROCHLORIDE 1 MG/ML
INJECTION, SOLUTION INTRAMUSCULAR; INTRAVENOUS
Status: DISCONTINUED | OUTPATIENT
Start: 2019-12-16 | End: 2019-12-16

## 2019-12-16 RX ORDER — PROPOFOL 10 MG/ML
VIAL (ML) INTRAVENOUS CONTINUOUS PRN
Status: DISCONTINUED | OUTPATIENT
Start: 2019-12-16 | End: 2019-12-16

## 2019-12-16 RX ORDER — ACETAMINOPHEN 325 MG/1
650 TABLET ORAL EVERY 4 HOURS PRN
Status: DISCONTINUED | OUTPATIENT
Start: 2019-12-16 | End: 2019-12-16 | Stop reason: HOSPADM

## 2019-12-16 RX ORDER — SODIUM CHLORIDE 9 MG/ML
INJECTION, SOLUTION INTRAVENOUS CONTINUOUS PRN
Status: DISCONTINUED | OUTPATIENT
Start: 2019-12-16 | End: 2019-12-16

## 2019-12-16 RX ORDER — LIDOCAINE HYDROCHLORIDE 10 MG/ML
1 INJECTION, SOLUTION EPIDURAL; INFILTRATION; INTRACAUDAL; PERINEURAL ONCE
Status: COMPLETED | OUTPATIENT
Start: 2019-12-16 | End: 2019-12-16

## 2019-12-16 RX ORDER — SODIUM CHLORIDE 0.9 % (FLUSH) 0.9 %
2 SYRINGE (ML) INJECTION
Status: DISCONTINUED | OUTPATIENT
Start: 2019-12-16 | End: 2019-12-16 | Stop reason: HOSPADM

## 2019-12-16 RX ORDER — HYDROCODONE BITARTRATE AND ACETAMINOPHEN 5; 325 MG/1; MG/1
1 TABLET ORAL EVERY 4 HOURS PRN
Status: DISCONTINUED | OUTPATIENT
Start: 2019-12-16 | End: 2019-12-16 | Stop reason: HOSPADM

## 2019-12-16 RX ORDER — OXYCODONE HYDROCHLORIDE 5 MG/1
10 TABLET ORAL EVERY 4 HOURS PRN
Status: DISCONTINUED | OUTPATIENT
Start: 2019-12-16 | End: 2019-12-16 | Stop reason: HOSPADM

## 2019-12-16 RX ORDER — LIDOCAINE HCL/PF 100 MG/5ML
SYRINGE (ML) INTRAVENOUS
Status: DISCONTINUED | OUTPATIENT
Start: 2019-12-16 | End: 2019-12-16

## 2019-12-16 RX ORDER — KETAMINE HCL IN 0.9 % NACL 50 MG/5 ML
SYRINGE (ML) INTRAVENOUS
Status: DISCONTINUED | OUTPATIENT
Start: 2019-12-16 | End: 2019-12-16

## 2019-12-16 RX ORDER — ACETAMINOPHEN 325 MG/1
650 TABLET ORAL
Qty: 20 TABLET | Refills: 0 | Status: SHIPPED | OUTPATIENT
Start: 2019-12-16 | End: 2020-01-22

## 2019-12-16 RX ORDER — OXYCODONE HYDROCHLORIDE 5 MG/1
5 TABLET ORAL
Status: DISCONTINUED | OUTPATIENT
Start: 2019-12-16 | End: 2019-12-16 | Stop reason: HOSPADM

## 2019-12-16 RX ORDER — FENTANYL CITRATE 50 UG/ML
INJECTION, SOLUTION INTRAMUSCULAR; INTRAVENOUS
Status: DISCONTINUED | OUTPATIENT
Start: 2019-12-16 | End: 2019-12-16

## 2019-12-16 RX ORDER — METOCLOPRAMIDE HYDROCHLORIDE 5 MG/ML
5 INJECTION INTRAMUSCULAR; INTRAVENOUS EVERY 6 HOURS PRN
Status: DISCONTINUED | OUTPATIENT
Start: 2019-12-16 | End: 2019-12-16 | Stop reason: HOSPADM

## 2019-12-16 RX ORDER — ACETAMINOPHEN 325 MG/1
650 TABLET ORAL
Qty: 20 TABLET | Refills: 0 | Status: SHIPPED | OUTPATIENT
Start: 2019-12-16 | End: 2019-12-16 | Stop reason: SDUPTHER

## 2019-12-16 RX ORDER — PHENYLEPHRINE HYDROCHLORIDE 10 MG/ML
INJECTION INTRAVENOUS
Status: DISCONTINUED | OUTPATIENT
Start: 2019-12-16 | End: 2019-12-16

## 2019-12-16 RX ORDER — PROPOFOL 10 MG/ML
VIAL (ML) INTRAVENOUS
Status: DISCONTINUED | OUTPATIENT
Start: 2019-12-16 | End: 2019-12-16

## 2019-12-16 RX ORDER — CEFAZOLIN SODIUM 1 G/3ML
2 INJECTION, POWDER, FOR SOLUTION INTRAMUSCULAR; INTRAVENOUS
Status: COMPLETED | OUTPATIENT
Start: 2019-12-16 | End: 2019-12-16

## 2019-12-16 RX ORDER — ONDANSETRON 2 MG/ML
INJECTION INTRAMUSCULAR; INTRAVENOUS
Status: DISCONTINUED | OUTPATIENT
Start: 2019-12-16 | End: 2019-12-16

## 2019-12-16 RX ORDER — ONDANSETRON 8 MG/1
8 TABLET, ORALLY DISINTEGRATING ORAL EVERY 12 HOURS PRN
Qty: 15 TABLET | Refills: 0 | Status: SHIPPED | OUTPATIENT
Start: 2019-12-16 | End: 2019-12-16 | Stop reason: SDUPTHER

## 2019-12-16 RX ORDER — EPINEPHRINE 1 MG/ML
INJECTION, SOLUTION INTRACARDIAC; INTRAMUSCULAR; INTRAVENOUS; SUBCUTANEOUS
Status: DISCONTINUED | OUTPATIENT
Start: 2019-12-16 | End: 2019-12-16 | Stop reason: HOSPADM

## 2019-12-16 RX ORDER — FENTANYL CITRATE 50 UG/ML
25 INJECTION, SOLUTION INTRAMUSCULAR; INTRAVENOUS EVERY 5 MIN PRN
Status: DISCONTINUED | OUTPATIENT
Start: 2019-12-16 | End: 2019-12-16 | Stop reason: HOSPADM

## 2019-12-16 RX ORDER — ONDANSETRON 8 MG/1
8 TABLET, ORALLY DISINTEGRATING ORAL EVERY 12 HOURS PRN
Qty: 15 TABLET | Refills: 0 | Status: SHIPPED | OUTPATIENT
Start: 2019-12-16 | End: 2020-01-22

## 2019-12-16 RX ORDER — DEXAMETHASONE SODIUM PHOSPHATE 4 MG/ML
INJECTION, SOLUTION INTRA-ARTICULAR; INTRALESIONAL; INTRAMUSCULAR; INTRAVENOUS; SOFT TISSUE
Status: DISCONTINUED | OUTPATIENT
Start: 2019-12-16 | End: 2019-12-16

## 2019-12-16 RX ORDER — IBUPROFEN 600 MG/1
600 TABLET ORAL EVERY 6 HOURS PRN
Qty: 30 TABLET | Refills: 0 | Status: SHIPPED | OUTPATIENT
Start: 2019-12-16 | End: 2019-12-26 | Stop reason: SDUPTHER

## 2019-12-16 RX ORDER — METOCLOPRAMIDE HYDROCHLORIDE 5 MG/ML
10 INJECTION INTRAMUSCULAR; INTRAVENOUS EVERY 10 MIN PRN
Status: DISCONTINUED | OUTPATIENT
Start: 2019-12-16 | End: 2019-12-16 | Stop reason: HOSPADM

## 2019-12-16 RX ORDER — SCOLOPAMINE TRANSDERMAL SYSTEM 1 MG/1
1 PATCH, EXTENDED RELEASE TRANSDERMAL
Status: DISCONTINUED | OUTPATIENT
Start: 2019-12-16 | End: 2019-12-16 | Stop reason: HOSPADM

## 2019-12-16 RX ADMIN — DEXMEDETOMIDINE HYDROCHLORIDE 4 MCG: 100 INJECTION, SOLUTION, CONCENTRATE INTRAVENOUS at 09:12

## 2019-12-16 RX ADMIN — Medication 10 MG: at 09:12

## 2019-12-16 RX ADMIN — FENTANYL CITRATE 25 MCG: 50 INJECTION, SOLUTION INTRAMUSCULAR; INTRAVENOUS at 09:12

## 2019-12-16 RX ADMIN — PHENYLEPHRINE HYDROCHLORIDE 100 MCG: 10 INJECTION INTRAVENOUS at 07:12

## 2019-12-16 RX ADMIN — ACETAMINOPHEN 1000 MG: 10 INJECTION, SOLUTION INTRAVENOUS at 07:12

## 2019-12-16 RX ADMIN — SUCCINYLCHOLINE CHLORIDE 100 MG: 20 INJECTION, SOLUTION INTRAMUSCULAR; INTRAVENOUS at 07:12

## 2019-12-16 RX ADMIN — ROCURONIUM BROMIDE 30 MG: 10 INJECTION, SOLUTION INTRAVENOUS at 07:12

## 2019-12-16 RX ADMIN — ROCURONIUM BROMIDE 20 MG: 10 INJECTION, SOLUTION INTRAVENOUS at 08:12

## 2019-12-16 RX ADMIN — ONDANSETRON 4 MG: 2 INJECTION INTRAMUSCULAR; INTRAVENOUS at 07:12

## 2019-12-16 RX ADMIN — Medication 30 MG: at 07:12

## 2019-12-16 RX ADMIN — MIDAZOLAM HYDROCHLORIDE 2 MG: 1 INJECTION, SOLUTION INTRAMUSCULAR; INTRAVENOUS at 07:12

## 2019-12-16 RX ADMIN — PHENYLEPHRINE HYDROCHLORIDE 100 MCG: 10 INJECTION INTRAVENOUS at 10:12

## 2019-12-16 RX ADMIN — SODIUM CHLORIDE: 0.9 INJECTION, SOLUTION INTRAVENOUS at 06:12

## 2019-12-16 RX ADMIN — REMIFENTANIL HYDROCHLORIDE 0.1 MCG/KG/MIN: 1 INJECTION, POWDER, LYOPHILIZED, FOR SOLUTION INTRAVENOUS at 07:12

## 2019-12-16 RX ADMIN — PROPOFOL 200 MCG/KG/MIN: 10 INJECTION, EMULSION INTRAVENOUS at 07:12

## 2019-12-16 RX ADMIN — LIDOCAINE HYDROCHLORIDE 100 MG: 20 INJECTION, SOLUTION INTRAVENOUS at 07:12

## 2019-12-16 RX ADMIN — FENTANYL CITRATE 100 MCG: 50 INJECTION, SOLUTION INTRAMUSCULAR; INTRAVENOUS at 07:12

## 2019-12-16 RX ADMIN — DEXMEDETOMIDINE HYDROCHLORIDE 4 MCG: 100 INJECTION, SOLUTION, CONCENTRATE INTRAVENOUS at 10:12

## 2019-12-16 RX ADMIN — DEXAMETHASONE SODIUM PHOSPHATE 12 MG: 4 INJECTION, SOLUTION INTRAMUSCULAR; INTRAVENOUS at 07:12

## 2019-12-16 RX ADMIN — Medication 10 MG: at 08:12

## 2019-12-16 RX ADMIN — PROPOFOL 50 MG: 10 INJECTION, EMULSION INTRAVENOUS at 10:12

## 2019-12-16 RX ADMIN — CEFAZOLIN 2 G: 330 INJECTION, POWDER, FOR SOLUTION INTRAMUSCULAR; INTRAVENOUS at 07:12

## 2019-12-16 RX ADMIN — OXYCODONE HYDROCHLORIDE 5 MG: 5 TABLET ORAL at 02:12

## 2019-12-16 RX ADMIN — PROPOFOL 250 MG: 10 INJECTION, EMULSION INTRAVENOUS at 07:12

## 2019-12-16 RX ADMIN — SUGAMMADEX 200 MG: 100 INJECTION, SOLUTION INTRAVENOUS at 10:12

## 2019-12-16 RX ADMIN — FENTANYL CITRATE 25 MCG: 50 INJECTION, SOLUTION INTRAMUSCULAR; INTRAVENOUS at 10:12

## 2019-12-16 RX ADMIN — SODIUM CHLORIDE, SODIUM GLUCONATE, SODIUM ACETATE, POTASSIUM CHLORIDE, MAGNESIUM CHLORIDE, SODIUM PHOSPHATE, DIBASIC, AND POTASSIUM PHOSPHATE: .53; .5; .37; .037; .03; .012; .00082 INJECTION, SOLUTION INTRAVENOUS at 07:12

## 2019-12-16 RX ADMIN — SODIUM CHLORIDE 0.25 MCG/KG/MIN: 9 INJECTION, SOLUTION INTRAVENOUS at 08:12

## 2019-12-16 RX ADMIN — LIDOCAINE HYDROCHLORIDE 0.1 MG: 10 INJECTION, SOLUTION EPIDURAL; INFILTRATION; INTRACAUDAL; PERINEURAL at 06:12

## 2019-12-16 RX ADMIN — ROCURONIUM BROMIDE 10 MG: 10 INJECTION, SOLUTION INTRAVENOUS at 09:12

## 2019-12-16 RX ADMIN — ROCURONIUM BROMIDE 10 MG: 10 INJECTION, SOLUTION INTRAVENOUS at 10:12

## 2019-12-16 NOTE — OP NOTE
DATE OF OPERATION: 12/16/2019    SURGEON:  Charlie Altman MD     ASSISTANT SURGEON:  Abhishek Silveira DO     OPERATION:     1. Endoscopic septoplasty.  2. Bilateral inferior turbinate reduction with submucosal resection.  3. Bilateral image-guided endoscopic total ethmoidectomy.  4. Bilateral image-guided endoscopic maxillary antrostomy.  5. Bilateral image-guided endoscopic sphenoidotomy.  6. Bilateral image-guided endoscopic frontal dissection with Draf IIA sinusotomy.     PREOPERATIVE DIAGNOSIS:      1. Deviated nasal septum.  2. Hypertrophic turbinates.  3. Chronic rhinosinusitis.  4. Sinonasal polyposis.     POSTOPERATIVE DIAGNOSIS:      1. Deviated nasal septum.  2. Hypertrophic turbinates.  3. Chronic rhinosinusitis.  4. Sinonasal polyposis.     ANESTHESIA: Total intravenous general anesthesia.     COMPLICATIONS: None.     ESTIMATED BLOOD LOSS: 100 mL     SPECIMEN: Bilateral ethmoid. Right maxillary sinus polyps.  Left maxillary sinus cultures for fungus and bacteria.     WOUND EXPECTANCY: Clean-contaminated.    DRESSING: Propel in the bilateral middle meatus. No nasal packing.    FINDINGS: Leftward septal deviation.  Compound inferior turbinate hypertrophy.   Diffuse moderate polyposis and polypoid edema with centripetal involvement of all sinuses.  Thick mucoid exudate pooling in bilateral maxillary sinuses.  Large bilateral Ignrid cells.     INDICATIONS: Chronic rhinosinusitis and anatomic nasal obstruction, not controlled with maximal medical therapy.     I discussed the risks, benefits and alternatives of surgical correction of the septal deviation, turbinate hypertrophy and chronically obstructed sinuses with the patient as well as the expected postoperative course. I gave him the opportunity to ask questions and I answered all of them. On the morning of surgery I again met with the patient and reviewed the indications for surgery and he consented to proceed.     DESCRIPTION OF PROCEDURE: The patient was  brought to the operating room and placed supine on the operating table. The patient was placed under general anesthesia and intubated. The patient was positioned with a donut under the head and the image-guidance headset for the Medtronic Fusion system was applied.  Image-guided navigation was indicated to facilitate exenteration of all ethmoid cells and the extent of sinusotomy.  The CT scan disc was loaded into the image-guidance system and registered with the patient tracking system according to the 's instructions. The pointer was calibrated and registration was verified using predefined landmarks.  Cottonoid pledgets soaked with 4% cocaine were placed into the nasal cavity bilaterally for mucosal decongestion. The mucosa of the nasal septum was injected with 1% lidocaine with 1:100,000 parts epinephrine. Prophylactic cefazolin was given prior to the surgery start. A time-out was performed to confirm the proper patient, site and procedure.  The CT images were again reviewed prior to the surgical start. The patient was prepped and draped in the usual fashion.  The bed was placed in 20-degree reverse Trendelenberg position.     Surgery began with performance of submucous resection of the nasal septum. This began with additional injections, assisted by a 0-degree endoscope. Then, a hemitransfixion incision was made using a #15 blade on the left side. The submucoperichondrial plane was identified and this was elevated using a [place] elevator. This plane was carried posteriorly to the bony-cartilaginous junction.  A Appling elevator was used to incise the cartilage at the junction and a contralateral mucoperiosteal flap was elevated. Then, using a 0-degree endoscope, the septal pocket was visualized and there was an additional long process of cartilage along the floor causing a deviation. This was resected using a Appling elevator and was removed. There was a hypertrophic crest of the maxillary bone and this  was reduced using a 4 mm osteotome.     Additional elevation of the flaps over the vomer revealed a large spur that was jutting into the middle meatus. This portion of the bone was resected top and bottom using a Fomon scissors and the spur was removed using a Zeenat forceps. After removal, there was a small perforation on the left side of the mucoperichondrial flap, but the contralateral flap remained intact. At this point, 10,000 units of topical thrombin with 1:10,000 parts epinephrine was applied to pledgets and placed between the flaps for topical hemostasis.  After these pledgets were removed, there was excellent hemostasis at the septal site.       Then, under endoscopic visualization, a transseptal quilting suture of 4-0 plain gut was used to reapproximate the nasal septal flaps.  Then the hemitransfixion incision was closed using 4-0 chromic gut suture in figure-of-eight fashion times two.   Repeated nasal endoscopy at this point revealed marked improvement of the septal deviation and good visualization of the vertical suspension of both middle turbinates.     Attention was then turned to the turbinates.  Additional injections were performed around the inferior turbinates and the sphenopalatine ganglion region bilaterally.  Incisions were made in the anterior head of the inferior turbinate using a #6400 blade.  A Guillaume elevator was then tunnelled medially to the turbinate bone and used to segmentally outfracture and morselize the bone.  Then, a 2 mm blade on the powered tissue shaver was tunneled submucosally and used to resect soft tissue of the turbinate while overlying mucosa was preserved.   Finally, the turbinates were outfractured using a Ventura/Boies elevator.  An identical procedure was performed bilaterally.     Attention was then turned to the sinuses.   The left middle meatus was topically decongested using thrombin with epinephrine pledgets.    The uncinate process was then visualized and a  micro-yovani backbiter was used to incise the uncinate process. The uncinate was dissected to its superior attachment and removed using cutting forceps.  A oanh bullosa was present but no additional dissection was required.     After removal of the bone, the natural ostium of the maxillary sinus was visible. The lumen was visualized with a 30-degree scope and entered using a curved suction to confirm the dimension. The antrostomy was enlarged with cutting instruments to include the natural sinus ostium, taking care not to move anterior to the maxillary line so as to avoid injury to the nasolacrimal duct.  Additional bone was removed using forceps, including a large Ingrid cell that obstructed the natural ostium.  Polypoid tissue intermixed with purulent exudate  was present within the maxillary sinus.  This was sampled for bacterial and fungal cultures.     The ethmoid bulla was entered using a microdebrider and anterior ethmoidectomy was performed.  The roof of the bulla was removed with forceps and the suprabullar recess was exposed. The grand lamella of the middle turbinate was then traversed and posterior ethmoidectomy was performed.  An Onodi cell was not present.  The mucosa was hypertrophic throughout the sinuses, indicative of chronic inflammation.  Topical hemostatic agents on pledgets were then placed into the ethmoid cavity for hemostasis.    The sphenoid sinus rostrum was identified and the sinus was entered in a low and medial fashion, adjacent to the superior turbinate. This sphenoidotomy was enlarged to include the posterior segment of this superior turbinate and the natural ostium of the sphenoid sinus.  Polypoid tissue  was present within the sphenoid sinus.  This was thoroughly removed and sent for pathologic evaluation.    Dissection was performed at the site of the nasofrontal recess.  Using a 70-degree scope, a suprabullar cell was dissected and uncapped in a medial-to-lateral direction.  An  agger nasi cell was then opened from an retrograde approach.  Additional frontal cells were not present.  Afterward, the frontal sinus ostium was visible but stenotic.  Therefore, the ostium was enlarged anteriorly using cutting instruments, taking care to avoid circumerential mucosal injury.  Powered instrumentation was not required.  The floor of the frontal sinus was removed between the lamina of the orbit and the suspension of the middle turbinate to complete a Draf IIA sinusotomy.  The anterior ethmoidal artery was identified and avoided with assistance from the image-guidance system probe.  At the conclusion of dissection there was excellent visualization into the frontal sinus, which would not otherwise have been possible.     Attention was then turned to the right side of the sinonasal tract.  A similar procedure was performed on this side, including uncinectomy, maxillary antrostomy, total ethmoidectomy, sphenoidotomy and frontal sinus dissection.  Polypoid tissue was removed from the maxillary sinus along with another large Ingrid cell.     At the conclusion of these procedures, the image-guidance probe was used to verify that all ethmoid cells had been properly opened and that the skull base was visible and that the lamina papyracea had not been traversed on either side.  All sinuses were copiously irrigated with warm normal saline solution.     At this point, the pledgets were all removed. Genesis hemostatic agent was placed into the surgical sites.  A Propel steroid-eluting stent was placed into the bilateral ethmoid cavities.  The nasal cavity was not packed.  Mupirocin ointment was applied to the vestibule bilaterally.  At this point, the headset was removed and the drapes were taken down. Intravenous dexamethasone was given toward the end of the case. The patient was turned back toward the anesthesiologist and awakened from anesthesia, extubated and transferred to the recovery room in stable  condition.      POSTOPERATIVE PLAN:  Budesonide at first POV.

## 2019-12-16 NOTE — DISCHARGE INSTRUCTIONS
INSTRUCTIONS TO FOLLOW AFTER SINUS AND NASAL SURGERY  DR. McCOUL - [facility]    Office hours:  Weekdays 8:00 am to 5:00 pm.  Please call [phone] and ask to speak with his nurse, Mercedes.    After-hours & weekends:  Please call [phone] and ask to speak with the ENT resident doctor.    Your first office visit with Dr. Altman after surgery should have been already scheduled.  If you dont know when it is, call Dr. Shin nurse Mercedes at [phone].    Please call IMMMEDIATELY if you have:  - Temperature of 101° F or greater  - Any unusual, painful swelling  - Any active bleeding that saturates more than a 4x4 gauze  - Any thick drainage green or yellow drainage  - Changes in vision or swelling around the eye  - Pain not relieved by your prescribed pain medication    ACTIVITY:    Sleep on your back with the head of the bead elevated, up on 2-3 pillows, or in a recliner for the first 3 to 5 days. This will help with swelling.     After surgery you may have a lot of nasal drainage. This is normal. You may breathe through your nose if youre able but avoid inhaling forcibly. Let all drainage fall on your mustache dressing and change it as needed.    You may wake up after surgery with thick white stockings on. Wear them until you are walking around more. It is important to walk around often while at home to keep your blood circulating and prevent blood clots.    If you use CPAP or BiPAP to sleep at night, you should wait at least 48 hours before resuming use.  Dr. Altman will advise you when it is safe to do this.    You may shower 24 hours after surgery.    RESTRICTED ACTIVITIES AFTER SURGERY:    Do NOT blow your nose for 2 weeks. If you have to sneeze or cough, do so with your mouth open.     AVOID all heavy lifting, straining or bending for 2 weeks.     AVOID any sexual activity for 2 weeks after surgery.    AVOID semi-contact sports or vigorous exercising for 3-4 weeks. Dr. Altman will let you know  when you are cleared to resume exercise.    AVOID flying or swimming for 2 weeks.      Do NOT operate a motor vehicle or any type of heavy machinery within 24 hours of taking pain medication.    DO NOT smoke or be around smokers.    AVOID irritating substances that might make you sneeze, such as dust, chalk, harsh chemicals, and allergic triggers.  This might also include spicy foods.    DRESSINGS:    Change the gauze mustache dressing under your nose as needed. (If unsure what this dressing is or how to do this, ask your doctor or nurse before you leave the hospital.) You may have pinkish-red drainage for 2-3 days.    Usually there is no gauze packing placed inside the nose.  If packing is necessary, you will be informed by your surgeon.  Do not touch or pull at the packing. The packing will be removed by your doctor at your first visit after surgery.     You may also have a dissolvable stent or dissolvable sponge placed into the sinuses during surgery.  These usually do not need to be removed unless you are told otherwise by Dr. Altman.  You may notice small fragments of these items come out of your nose in the weeks following surgery.    MEDICATIONS:    After surgery, you will be sent home with prescriptions for pain medication and an anti-nausea medication.  Antibiotics are usually not necessary.    Most people need pain medication for the first few days after surgery, although a narcotic is rarely necessary.  The best pain control comes from a combination of acetaminophen (Tylenol) and ibuprofen (Motrin).  You will be given prescriptions for these at the recommended dose.  These can be alternated so that you are taking something every 2 or 3 hours.    Some people have problems with bowel movements after surgery. If you have NOT had a bowel movement 3-5 days after surgery, go to your local pharmacy and purchase an over the counter stool softener such as COLACE. You can also ask the pharmacist for his or her  recommendation. If you still do not have a bowel movement after starting the softener, please call the office.    You will need these over-the-counter medications after surgery:    SALINE SINUS RINSE (Etienne Med brand):  You will use this to rinse out your nose and sinuses after surgery.  Begin doing this the day after surgery, unless instructed otherwise by Dr. Altman.  You should do this 2 times a day, following the instructions on the box.  AFRIN (regular strength): Only use if you have nasal congestion or bleeding. Use 2 times per day for 3 days, stop for 1 day, continue 2 times per day for 3 days, then stop completely.  NOTE:  You may not need to do this at all.    Apply antibiotic ointment twice a day    DIET:    Avoid hot and spicy foods for 1 week after surgery.    Begin with bland foods the evening after surgery and advance to your regular diet as tolerated.  It is not necessary to take only soft food unless you are recovering from tonsil surgery.    Drink plenty of fluids (water is best).     Avoid alcoholic and caffeinated beverages for 1 week after surgery because they can cause you to become dehydrated.

## 2019-12-16 NOTE — ANESTHESIA PREPROCEDURE EVALUATION
12/16/2019  Miguel Gaffney is a 39 y.o., male.    Procedure Summary     Case: [id number] Date/Time: 12/16/19 0700   Procedures:       FESS, USING COMPUTER-ASSISTED NAVIGATION (Bilateral Face) - TIVA      SEPTOPLASTY, NOSE (N/A Nose) - TIVA   Anesthesia type: General   Diagnosis:       Chronic rhinitis [J31.0]      Other chronic sinusitis [J32.8]      Nasal polyposis [J33.9]      Nasal septal deviation [J34.2]      Hypertrophy of inferior nasal turbinate [J34.3]     Active Ambulatory Problems     Diagnosis Date Noted    Recurrent sinus infections     Allergic rhinitis due to animal hair and dander     Acute bronchiolitis with bronchospasm 03/29/2019    Vitamin D insufficiency 03/30/2019    Asthma with acute exacerbation      Resolved Ambulatory Problems     Diagnosis Date Noted    No Resolved Ambulatory Problems     Past Medical History:   Diagnosis Date    Seasonal allergic rhinitis due to pollen        History reviewed. No pertinent surgical history.  Current Discharge Medication List      CONTINUE these medications which have NOT CHANGED    Details   azelastine (ASTELIN) 137 mcg (0.1 %) nasal spray 1 spray (137 mcg total) by Nasal route 2 (two) times daily as needed for Rhinitis.  Qty: 30 mL, Refills: 11    Associated Diagnoses: Recurrent sinus infections; Seasonal allergic rhinitis due to pollen      fluticasone furoate-vilanterol (BREO ELLIPTA) 200-25 mcg/dose DsDv diskus inhaler Inhale 1 puff into the lungs once daily. Controller  Qty: 1 each, Refills: 5      mometasone (NASONEX) 50 mcg/actuation nasal spray 2 sprays by Nasal route 2 (two) times daily.  Qty: 1 each, Refills: 11    Associated Diagnoses: Recurrent sinus infections; Seasonal allergic rhinitis due to pollen      allerg xt,D.farinae-D.pteronys (ODACTRA) 12 SQ-HDM Subl Place 1 tablet under the tongue once daily.  Qty: 30 tablet, Refills:  11             Anesthesia Evaluation    I have reviewed the Patient Summary Reports.    I have reviewed the Nursing Notes.   I have reviewed the Medications.     Review of Systems  Anesthesia Hx:  No previous Anesthesia  Denies Family Hx of Anesthesia complications.   Denies Personal Hx of Anesthesia complications.   Social:  Non-Smoker, Social Alcohol Use    Hematology/Oncology:  Hematology Normal   Oncology Normal     EENT/Dental:   chronic allergic rhinitis   Cardiovascular:  Cardiovascular Normal Exercise tolerance: good     Pulmonary:   Asthma    Renal/:  Renal/ Normal     Hepatic/GI:  Hepatic/GI Normal    Neurological:  Neurology Normal    Endocrine:  Endocrine Normal        Physical Exam  General:  Well nourished    Airway/Jaw/Neck:  Airway Findings: Mouth Opening: Normal Tongue: Normal  General Airway Assessment: Adult  Mallampati: II  TM Distance: Normal, at least 6 cm      Dental:  Dental Findings: In tact        Mental Status:  Mental Status Findings:  Cooperative, Alert and Oriented         Anesthesia Plan  Type of Anesthesia, risks & benefits discussed:  Anesthesia Type:  general  Patient's Preference:   Intra-op Monitoring Plan: standard ASA monitors  Intra-op Monitoring Plan Comments:   Post Op Pain Control Plan: per primary service following discharge from PACU  Post Op Pain Control Plan Comments:   Induction:   IV  Beta Blocker:  Patient is not currently on a Beta-Blocker (No further documentation required).       Informed Consent: Patient understands risks and agrees with Anesthesia plan.  Questions answered. Anesthesia consent signed with patient.  ASA Score: 2     Day of Surgery Review of History & Physical:            Ready For Surgery From Anesthesia Perspective.

## 2019-12-16 NOTE — INTERVAL H&P NOTE
The patient has been examined and the H&P has been reviewed:    I concur with the findings and no changes have occurred since H&P was written.    Anesthesia/Surgery risks, benefits and alternative options discussed and understood by patient/family.          Active Hospital Problems    Diagnosis  POA    Sinusitis [J32.9]  Yes      Resolved Hospital Problems   No resolved problems to display.

## 2019-12-16 NOTE — TRANSFER OF CARE
Anesthesia Transfer of Care Note    Patient: Miguel THOMAS Mallula    Procedure(s) Performed: Procedure(s) (LRB):  FESS, USING COMPUTER-ASSISTED NAVIGATION (Bilateral)  SEPTOPLASTY, NOSE (N/A)    Patient location: PACU    Anesthesia Type: general    Transport from OR: Transported from OR on 6-10 L/min O2 by face mask with adequate spontaneous ventilation    Post pain: adequate analgesia    Post assessment: no apparent anesthetic complications and tolerated procedure well    Post vital signs: stable    Level of consciousness: sedated    Nausea/Vomiting: no nausea/vomiting    Complications: none    Transfer of care protocol was followed      Last vitals:   Visit Vitals  BP (!) (P) 92/55   Pulse 70   Temp 36.7 °C (98.1 °F) (Oral)   Resp (P) 10   Ht 6' (1.829 m)   Wt 81.6 kg (180 lb)   SpO2 98%   BMI 24.41 kg/m²

## 2019-12-16 NOTE — BRIEF OP NOTE
[facility]  Brief Operative Note     SUMMARY     Surgery Date:   12/16/2019     SURGEON(S):   Surgeon(s) and Role:     * Charlie Altman MD - Primary     * Abhishek Silveira MD - Resident - Assisting    ANESTHESIA STAFF:   Yari Garcia MD    OR STAFF:   Circulator: Argenis Vences RN; Marisol Neely RN  Relief Circulator: Ashley Valenzuela RN  Relief Scrub: ST Kosta  Scrub Person: Armida KARLEE ST Ellie      Pre-op Diagnosis:  Chronic rhinitis [J31.0]  Other chronic sinusitis [J32.8]  Nasal polyposis [J33.9]  Nasal septal deviation [J34.2]  Hypertrophy of inferior nasal turbinate [J34.3]    Post-op Diagnosis:  Post-Op Diagnosis Codes:     * Chronic rhinitis [J31.0]     * Other chronic sinusitis [J32.8]     * Nasal polyposis [J33.9]     * Nasal septal deviation [J34.2]     * Hypertrophy of inferior nasal turbinate [J34.3]    Procedure(s) (LRB):  FESS, USING COMPUTER-ASSISTED NAVIGATION (Bilateral)  SEPTOPLASTY, NOSE (N/A)    Anesthesia: General    Description of the procedure: Bl 4 sinus ESS, IT reduction, septoplasty, bl propel stents    Findings: see full op note    Estimated Blood Loss: 75cc         Specimens:   Specimen (12h ago, onward)    None          LENGTH OF SURGERY:   4 Hr 14 Min 20 Sec    Event Time In Time Out   In Facility [id number]    In Pre-Procedure 0631    Physician Available     Anesthesia Available     Pre-Op: Bedside Procedure Start     Pre-Op: Bedside Procedure Stop     Pre-Procedure Complete 0650    Out of Pre-Procedure     Holding Start     Holding Stop     Anesthesia Start 0650    Anesthesia Start Data Collection     Setup Start 0648    Setup Complete     In Room 0700    Prep Start 0735    Procedure Prep Complete 0735    Procedure Start 0741    Procedure Closing 1036    Emergence 1051    Procedure Finish 1053    Out of Room 1114    In OR Recovery 1114    Out of OR Recovery     Cleanup Start     Cleanup Complete     Cosmetic Start     Cosmetic Stop     Pain Mgmt In  Room     Pain Mgmt Out Room     In Recovery     Anesthesia Finish     Bedside Procedure Start     Bedside Procedure Stop     Recovery Care Complete     Out of Recovery     In Diagnostic Recovery     Out Diagnostic Recovery     In PACU Phase II     Out PACU Phase II     In PACU Ext     Out PACU Ext     In Recovery DOSC     Out Recovery DOS     Obs Rec Start     Obs Rec Stop     To Phase II     In Phase II     Phase II Care Complete     Out of Phase II     In Phase II Ext     Out Phase II Ext     Procedural Care Complete     Pain Follow Up Needed     Pain Follow Up Complete     PACU Bed Request       Discharge Note    SUMMARY     Admit Date: 12/16/2019    Discharge Date and Time:   11:17 AM    Hospital Course: Please see the preoperative H&P and other available documentation for full details related to history prior to this admission.  Briefly, pt presented to [facility] for elective outpatient procedure. Procedure, risks and benefits were discussed with patient. All questions were answered. Informed consent was obtained. Pt was taken to the OR and underwent the above procedures without complications. Pt  was transferred to PACU in stable condition and discharged to home the same day.     Final Diagnosis: Post-Op Diagnosis Codes:     * Chronic rhinitis [J31.0]     * Other chronic sinusitis [J32.8]     * Nasal polyposis [J33.9]     * Nasal septal deviation [J34.2]     * Hypertrophy of inferior nasal turbinate [J34.3]    Disposition: Home/Self Care    Discharge Medication List:   Miguel Gaffney   Home Medication Instructions SCOOBY:[id number]    Printed on:12/16/19 3842   Medication Information                      acetaminophen (TYLENOL) 325 MG tablet  Take 2 tablets (650 mg total) by mouth every 6 to 8 hours as needed for Pain.             azelastine (ASTELIN) 137 mcg (0.1 %) nasal spray  1 spray (137 mcg total) by Nasal route 2 (two) times daily as needed for Rhinitis.             fluticasone furoate-vilanterol (BREO  ELLIPTA) 200-25 mcg/dose DsDv diskus inhaler  Inhale 1 puff into the lungs once daily. Controller             ibuprofen (ADVIL,MOTRIN) 600 MG tablet  Take 1 tablet (600 mg total) by mouth every 6 (six) hours as needed for Pain.             mometasone (NASONEX) 50 mcg/actuation nasal spray  2 sprays by Nasal route 2 (two) times daily.             ondansetron (ZOFRAN-ODT) 8 MG TbDL  Take 1 tablet (8 mg total) by mouth every 12 (twelve) hours as needed.                 Discharge Procedure Orders   Diet general     Call MD for:  difficulty breathing, headache or visual disturbances     Call MD for:  redness, tenderness, or signs of infection (pain, swelling, redness, odor or green/yellow discharge around incision site)     Call MD for:  hives     Call MD for:   Order Comments: Persistent bleeding     No dressing needed     Activity as tolerated       Follow Up:   Follow-up Information     Charlie Altman MD On 12/24/2019.    Specialty:  Otolaryngology  Why:  For wound re-check  Contact information:  [facility]  [facility] [address]  [phone]

## 2019-12-17 NOTE — ANESTHESIA POSTPROCEDURE EVALUATION
Anesthesia Post Evaluation    Patient: Miguel THOMAS Mallula    Procedure(s) Performed: Procedure(s) (LRB):  FESS, USING COMPUTER-ASSISTED NAVIGATION (Bilateral)  SEPTOPLASTY, NOSE (N/A)    Final Anesthesia Type: general    Patient location during evaluation: PACU  Patient participation: Yes- Able to Participate  Level of consciousness: awake and alert and oriented  Post-procedure vital signs: reviewed and stable  Pain management: adequate  Airway patency: patent    PONV status at discharge: No PONV  Anesthetic complications: no      Cardiovascular status: hemodynamically stable  Respiratory status: nasal cannula  Hydration status: euvolemic  Follow-up not needed.          Vitals Value Taken Time   /80 12/16/2019  3:02 PM   Temp 36.7 °C (98 °F) 12/16/2019  3:02 PM   Pulse 82 12/16/2019  3:02 PM   Resp 18 12/16/2019  3:02 PM   SpO2 98 % 12/16/2019  1:38 PM   Vitals shown include unvalidated device data.      Event Time     Out of Recovery 12:15:00          Pain/Nusrat Score: Pain Rating Prior to Med Admin: 6 (12/16/2019  3:24 PM)  Pain Rating Post Med Admin: 0 (12/16/2019  3:24 PM)  Nusrat Score: 9 (12/16/2019 12:15 PM)

## 2019-12-18 LAB — BACTERIA SPEC AEROBE CULT: NORMAL

## 2019-12-19 ENCOUNTER — PATIENT MESSAGE (OUTPATIENT)
Dept: OTOLARYNGOLOGY | Facility: CLINIC | Age: 39
End: 2019-12-19

## 2019-12-20 LAB — BACTERIA SPEC ANAEROBE CULT: ABNORMAL

## 2019-12-22 ENCOUNTER — PATIENT MESSAGE (OUTPATIENT)
Dept: OTOLARYNGOLOGY | Facility: CLINIC | Age: 39
End: 2019-12-22

## 2019-12-22 ENCOUNTER — HOSPITAL ENCOUNTER (EMERGENCY)
Facility: HOSPITAL | Age: 39
Discharge: HOME | End: 2019-12-22
Attending: EMERGENCY MEDICINE
Payer: COMMERCIAL

## 2019-12-22 VITALS
SYSTOLIC BLOOD PRESSURE: 168 MMHG | WEIGHT: 180 LBS | RESPIRATION RATE: 16 BRPM | DIASTOLIC BLOOD PRESSURE: 98 MMHG | BODY MASS INDEX: 24.38 KG/M2 | HEIGHT: 72 IN | OXYGEN SATURATION: 98 % | TEMPERATURE: 98 F | HEART RATE: 98 BPM

## 2019-12-22 DIAGNOSIS — J34.89 SINUS PAIN: ICD-10-CM

## 2019-12-22 DIAGNOSIS — H04.203 EXCESSIVE TEAR PRODUCTION OF BOTH LACRIMAL GLANDS: ICD-10-CM

## 2019-12-22 DIAGNOSIS — G89.18 POSTOPERATIVE PAIN: Primary | ICD-10-CM

## 2019-12-22 PROCEDURE — 99284 EMERGENCY DEPT VISIT MOD MDM: CPT | Mod: ,,, | Performed by: EMERGENCY MEDICINE

## 2019-12-22 PROCEDURE — 99283 EMERGENCY DEPT VISIT LOW MDM: CPT

## 2019-12-22 PROCEDURE — 99284 PR EMERGENCY DEPT VISIT,LEVEL IV: ICD-10-PCS | Mod: ,,, | Performed by: EMERGENCY MEDICINE

## 2019-12-23 ENCOUNTER — TELEPHONE (OUTPATIENT)
Dept: OTOLARYNGOLOGY | Facility: CLINIC | Age: 39
End: 2019-12-23

## 2019-12-23 ENCOUNTER — PATIENT MESSAGE (OUTPATIENT)
Dept: OTOLARYNGOLOGY | Facility: CLINIC | Age: 39
End: 2019-12-23

## 2019-12-23 ENCOUNTER — HOSPITAL ENCOUNTER (EMERGENCY)
Facility: HOSPITAL | Age: 39
Discharge: HOME | End: 2019-12-23
Attending: EMERGENCY MEDICINE
Payer: COMMERCIAL

## 2019-12-23 VITALS
HEART RATE: 98 BPM | RESPIRATION RATE: 18 BRPM | HEIGHT: 72 IN | WEIGHT: 165 LBS | BODY MASS INDEX: 22.35 KG/M2 | OXYGEN SATURATION: 98 % | TEMPERATURE: 99 F | DIASTOLIC BLOOD PRESSURE: 83 MMHG | SYSTOLIC BLOOD PRESSURE: 145 MMHG

## 2019-12-23 DIAGNOSIS — R00.0 TACHYCARDIA: ICD-10-CM

## 2019-12-23 DIAGNOSIS — L03.213 PERIORBITAL CELLULITIS OF LEFT EYE: Primary | ICD-10-CM

## 2019-12-23 PROBLEM — H05.012 ORBITAL CELLULITIS ON LEFT: Status: ACTIVE | Noted: 2019-12-23

## 2019-12-23 LAB
ALBUMIN SERPL BCP-MCNC: 3.9 G/DL (ref 3.5–5.2)
ALP SERPL-CCNC: 82 U/L (ref 55–135)
ALT SERPL W/O P-5'-P-CCNC: 30 U/L (ref 10–44)
ANION GAP SERPL CALC-SCNC: 11 MMOL/L (ref 8–16)
AST SERPL-CCNC: 22 U/L (ref 10–40)
BASOPHILS # BLD AUTO: 0.04 K/UL (ref 0–0.2)
BASOPHILS NFR BLD: 0.3 % (ref 0–1.9)
BILIRUB SERPL-MCNC: 0.4 MG/DL (ref 0.1–1)
BUN SERPL-MCNC: 12 MG/DL (ref 6–20)
CALCIUM SERPL-MCNC: 9.3 MG/DL (ref 8.7–10.5)
CHLORIDE SERPL-SCNC: 103 MMOL/L (ref 95–110)
CO2 SERPL-SCNC: 24 MMOL/L (ref 23–29)
CREAT SERPL-MCNC: 0.8 MG/DL (ref 0.5–1.4)
CRP SERPL-MCNC: 68.3 MG/L (ref 0–8.2)
DIFFERENTIAL METHOD: ABNORMAL
EOSINOPHIL # BLD AUTO: 0.2 K/UL (ref 0–0.5)
EOSINOPHIL NFR BLD: 1.4 % (ref 0–8)
ERYTHROCYTE [DISTWIDTH] IN BLOOD BY AUTOMATED COUNT: 13.9 % (ref 11.5–14.5)
EST. GFR  (AFRICAN AMERICAN): >60 ML/MIN/1.73 M^2
EST. GFR  (NON AFRICAN AMERICAN): >60 ML/MIN/1.73 M^2
GLUCOSE SERPL-MCNC: 149 MG/DL (ref 70–110)
HCT VFR BLD AUTO: 41.9 % (ref 40–54)
HGB BLD-MCNC: 13.7 G/DL (ref 14–18)
IMM GRANULOCYTES # BLD AUTO: 0.09 K/UL (ref 0–0.04)
IMM GRANULOCYTES NFR BLD AUTO: 0.6 % (ref 0–0.5)
LYMPHOCYTES # BLD AUTO: 2.4 K/UL (ref 1–4.8)
LYMPHOCYTES NFR BLD: 16.6 % (ref 18–48)
MCH RBC QN AUTO: 26.9 PG (ref 27–31)
MCHC RBC AUTO-ENTMCNC: 32.7 G/DL (ref 32–36)
MCV RBC AUTO: 82 FL (ref 82–98)
MONOCYTES # BLD AUTO: 0.7 K/UL (ref 0.3–1)
MONOCYTES NFR BLD: 4.7 % (ref 4–15)
NEUTROPHILS # BLD AUTO: 11.2 K/UL (ref 1.8–7.7)
NEUTROPHILS NFR BLD: 76.4 % (ref 38–73)
NRBC BLD-RTO: 0 /100 WBC
PLATELET # BLD AUTO: 291 K/UL (ref 150–350)
PMV BLD AUTO: 9.1 FL (ref 9.2–12.9)
POTASSIUM SERPL-SCNC: 3.6 MMOL/L (ref 3.5–5.1)
PROT SERPL-MCNC: 7.6 G/DL (ref 6–8.4)
RBC # BLD AUTO: 5.09 M/UL (ref 4.6–6.2)
SODIUM SERPL-SCNC: 138 MMOL/L (ref 136–145)
WBC # BLD AUTO: 14.58 K/UL (ref 3.9–12.7)

## 2019-12-23 PROCEDURE — 93010 EKG 12-LEAD: ICD-10-PCS | Mod: ,,, | Performed by: INTERNAL MEDICINE

## 2019-12-23 PROCEDURE — 87040 BLOOD CULTURE FOR BACTERIA: CPT

## 2019-12-23 PROCEDURE — 99285 EMERGENCY DEPT VISIT HI MDM: CPT | Mod: 25

## 2019-12-23 PROCEDURE — 80053 COMPREHEN METABOLIC PANEL: CPT

## 2019-12-23 PROCEDURE — 85025 COMPLETE CBC W/AUTO DIFF WBC: CPT

## 2019-12-23 PROCEDURE — 96375 TX/PRO/DX INJ NEW DRUG ADDON: CPT

## 2019-12-23 PROCEDURE — 92004 PR EYE EXAM, NEW PATIENT,COMPREHESV: ICD-10-PCS | Mod: ,,, | Performed by: OPHTHALMOLOGY

## 2019-12-23 PROCEDURE — 93010 ELECTROCARDIOGRAM REPORT: CPT | Mod: ,,, | Performed by: INTERNAL MEDICINE

## 2019-12-23 PROCEDURE — 96365 THER/PROPH/DIAG IV INF INIT: CPT

## 2019-12-23 PROCEDURE — 63600175 PHARM REV CODE 636 W HCPCS: Performed by: PHYSICIAN ASSISTANT

## 2019-12-23 PROCEDURE — 96367 TX/PROPH/DG ADDL SEQ IV INF: CPT

## 2019-12-23 PROCEDURE — 99284 EMERGENCY DEPT VISIT MOD MDM: CPT | Mod: ,,, | Performed by: EMERGENCY MEDICINE

## 2019-12-23 PROCEDURE — 92004 COMPRE OPH EXAM NEW PT 1/>: CPT | Mod: ,,, | Performed by: OPHTHALMOLOGY

## 2019-12-23 PROCEDURE — 25000003 PHARM REV CODE 250: Performed by: EMERGENCY MEDICINE

## 2019-12-23 PROCEDURE — 93005 ELECTROCARDIOGRAM TRACING: CPT

## 2019-12-23 PROCEDURE — 99284 PR EMERGENCY DEPT VISIT,LEVEL IV: ICD-10-PCS | Mod: ,,, | Performed by: EMERGENCY MEDICINE

## 2019-12-23 PROCEDURE — 25500020 PHARM REV CODE 255: Performed by: EMERGENCY MEDICINE

## 2019-12-23 PROCEDURE — 96361 HYDRATE IV INFUSION ADD-ON: CPT

## 2019-12-23 PROCEDURE — 96366 THER/PROPH/DIAG IV INF ADDON: CPT

## 2019-12-23 PROCEDURE — 86140 C-REACTIVE PROTEIN: CPT

## 2019-12-23 PROCEDURE — 63600175 PHARM REV CODE 636 W HCPCS: Performed by: EMERGENCY MEDICINE

## 2019-12-23 RX ORDER — ACETAMINOPHEN 325 MG/1
650 TABLET ORAL
Status: COMPLETED | OUTPATIENT
Start: 2019-12-23 | End: 2019-12-23

## 2019-12-23 RX ORDER — IBUPROFEN 600 MG/1
600 TABLET ORAL
Status: COMPLETED | OUTPATIENT
Start: 2019-12-23 | End: 2019-12-23

## 2019-12-23 RX ORDER — AMOXICILLIN AND CLAVULANATE POTASSIUM 875; 125 MG/1; MG/1
1 TABLET, FILM COATED ORAL 2 TIMES DAILY
Qty: 20 TABLET | Refills: 0 | Status: SHIPPED | OUTPATIENT
Start: 2019-12-23 | End: 2020-01-02

## 2019-12-23 RX ORDER — METHYLPREDNISOLONE 4 MG/1
TABLET ORAL
Qty: 1 PACKAGE | Refills: 0 | Status: SHIPPED | OUTPATIENT
Start: 2019-12-23 | End: 2020-01-22

## 2019-12-23 RX ORDER — DEXAMETHASONE SODIUM PHOSPHATE 4 MG/ML
8 INJECTION, SOLUTION INTRA-ARTICULAR; INTRALESIONAL; INTRAMUSCULAR; INTRAVENOUS; SOFT TISSUE
Status: DISCONTINUED | OUTPATIENT
Start: 2019-12-23 | End: 2019-12-23

## 2019-12-23 RX ORDER — DEXAMETHASONE SODIUM PHOSPHATE 4 MG/ML
8 INJECTION, SOLUTION INTRA-ARTICULAR; INTRALESIONAL; INTRAMUSCULAR; INTRAVENOUS; SOFT TISSUE
Status: COMPLETED | OUTPATIENT
Start: 2019-12-23 | End: 2019-12-23

## 2019-12-23 RX ADMIN — SODIUM CHLORIDE 1000 ML: 0.9 INJECTION, SOLUTION INTRAVENOUS at 12:12

## 2019-12-23 RX ADMIN — ACETAMINOPHEN 650 MG: 325 TABLET ORAL at 02:12

## 2019-12-23 RX ADMIN — PIPERACILLIN AND TAZOBACTAM 4.5 G: 4; .5 INJECTION, POWDER, FOR SOLUTION INTRAVENOUS at 01:12

## 2019-12-23 RX ADMIN — IOHEXOL 100 ML: 350 INJECTION, SOLUTION INTRAVENOUS at 12:12

## 2019-12-23 RX ADMIN — IBUPROFEN 600 MG: 600 TABLET, FILM COATED ORAL at 02:12

## 2019-12-23 RX ADMIN — VANCOMYCIN HYDROCHLORIDE 1500 MG: 1.5 INJECTION, POWDER, LYOPHILIZED, FOR SOLUTION INTRAVENOUS at 02:12

## 2019-12-23 RX ADMIN — DEXAMETHASONE SODIUM PHOSPHATE 8 MG: 4 INJECTION, SOLUTION INTRA-ARTICULAR; INTRALESIONAL; INTRAMUSCULAR; INTRAVENOUS; SOFT TISSUE at 05:12

## 2019-12-23 NOTE — ED NOTES
Patient identifiers verified and correct for  Miguel Gaffney 1980.  LOC: The patient is awake, alert and aware of environment with an appropriate affect, the patient is oriented x 3 and speaking appropriately.   APPEARANCE: Patient appears comfortable and in no acute distress, patient is clean and well groomed.  SKIN: The skin is warm and dry, color consistent with ethnicity, patient has normal skin turgor and moist mucus membranes, skin intact, no breakdown or bruising noted. L eye swelling/pain   MUSCULOSKELETAL: Patient moving all extremities spontaneously, no swelling noted.  RESPIRATORY: Airway is open and patent, respirations are spontaneous, patient has a normal effort and rate, no accessory muscle use noted, with O2 sats noted at 98% on room air.  CARDIAC: Patient has a tachy rate and regular rhythm, no edema noted, capillary refill < 3 seconds.   GASTRO: Soft and non tender to palpation, no distention noted, normoactive bowel sounds present in all four quadrants. Pt states bowel movements have been regular.  : Pt denies any pain or frequency with urination.  NEURO: Pt opens eyes spontaneously, behavior appropriate to situation, follows commands, facial expression symmetrical, bilateral hand grasp equal and even, purposeful motor response noted, normal sensation in all extremities when touched with a finger.

## 2019-12-23 NOTE — ED TRIAGE NOTES
Patient presents with L eye swelling and pain with movements, reports both eyes have been watering. Seen last night for sudden onset headache. Sensitivity to light. Sinus surgery 1 week ago, reports malaise.

## 2019-12-23 NOTE — ED PROVIDER NOTES
Encounter Date: 12/22/2019       History     Chief Complaint   Patient presents with    Headache     Pt c/o had surgery on sinus this past Monday. Pt has had pain behind bilateral eyes since surgery and has increased. Pt denies changes in vision, N/V.      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  Patient has been doing well overall postoperatively, reports gradual decreased in quantity of content expressed from saline rinses.  Pain has been tolerable with 600 mg ibuprofen and Tylenol dosing.  He awoke this morning with severe, 9/10 pain over frontal sinuses and nose. Associated with increased tear production bilateral eyes.  He took 800 mg ibuprofen with Tylenol this afternoon with improvement to 2/10 pain, closer to his postoperative baseline.  Denies associated fever, chills, fatigue, decreased appetite, nausea, vomiting, ear pain or drainage, sore throat, nasal drainage, headache, vision changes, eye pain, photophobic, neck pain or stiffness. He has close follow-up with clinic, seeing the PA this Thursday and his surgeon next week.  The patients available PMH, PSH, Social History, medications, allergies, and triage vital signs were reviewed just prior to their medical evaluation.  A ten point review of systems was completed and is negative except as documented above.  Patient denies any other acute medical complaint.    Please be advised this text was dictated with Expert software and may contain errors due to translation.           Review of patient's allergies indicates:  No Known Allergies  Past Medical History:   Diagnosis Date    Acute bronchiolitis with bronchospasm 3/29/2019    Recurrent sinus infections     Seasonal allergic rhinitis due to pollen     Vitamin D insufficiency 3/30/2019    Recommend Vitamin D 2000 IU daily.     Past Surgical History:   Procedure Laterality Date    FUNCTIONAL  ENDOSCOPIC SINUS SURGERY (FESS) USING COMPUTER-ASSISTED NAVIGATION Bilateral 12/16/2019    Procedure: FESS, USING COMPUTER-ASSISTED NAVIGATION;  Surgeon: Charlie Altman MD;  Location: [facility] OR [address] [place] [address];  Service: ENT;  Laterality: Bilateral;  TIVA    NASAL SEPTOPLASTY N/A 12/16/2019    Procedure: SEPTOPLASTY, NOSE;  Surgeon: Charlie Altman MD;  Location: [facility] OR [address] [place] [address];  Service: ENT;  Laterality: N/A;  TIVA     Family History   Problem Relation Age of Onset    Hypertension Mother     Diabetes Father     Heart disease Father 55     Social History     Tobacco Use    Smoking status: Never Smoker    Smokeless tobacco: Never Used   Substance Use Topics    Alcohol use: Yes     Comment: occasional    Drug use: Not Currently     Review of Systems   Constitutional: Negative for appetite change, chills, fatigue and fever.   HENT: Positive for sinus pain (severe this morning, since improved). Negative for congestion, dental problem, ear discharge, ear pain, facial swelling (improving per patient), postnasal drip, rhinorrhea, sore throat, tinnitus, trouble swallowing and voice change.    Eyes: Positive for discharge (inc tearing). Negative for photophobia, pain, redness and itching.   Respiratory: Negative for shortness of breath.    Cardiovascular: Negative for chest pain.   Gastrointestinal: Negative for abdominal pain, nausea and vomiting.   Musculoskeletal: Negative for neck pain and neck stiffness.   Neurological: Negative for dizziness, weakness and headaches.   Psychiatric/Behavioral: Negative for confusion.       Physical Exam     Initial Vitals [12/22/19 1925]   BP Pulse Resp Temp SpO2   (!) 168/98 98 16 97.6 °F (36.4 °C) 98 %      MAP       --         Physical Exam    Vitals reviewed.  Constitutional: He appears well-developed and well-nourished. He is not diaphoretic. No distress.   Well appearing   HENT:   Head: Normocephalic and atraumatic.   Right Ear: External ear normal.   Left Ear: External ear  normal.   Mouth/Throat: Oropharynx is clear and moist. No oropharyngeal exudate.   Tenderness over septum, ethmoid sinus and frontal sinus.  Significantly improved per patient from this morning    Bilateral nares with minimal clots, erythema.  No purulent discharge/septal hematoma appreciated   Eyes: Conjunctivae and EOM are normal. Pupils are equal, round, and reactive to light. Right eye exhibits no discharge. Left eye exhibits no discharge. No scleral icterus.   Some increased tear production B/L, no discharge, no swelling or induration of lacrimal ducts   Neck: Normal range of motion. Neck supple.   No meningismus   Cardiovascular: Normal rate, regular rhythm and intact distal pulses.   Pulmonary/Chest: Breath sounds normal. No stridor. No respiratory distress. He has no wheezes. He has no rhonchi. He has no rales.   Abdominal: Soft. Bowel sounds are normal. There is no tenderness.   Musculoskeletal: Normal range of motion. He exhibits no edema.   Lymphadenopathy:     He has no cervical adenopathy.   Neurological: He is alert and oriented to person, place, and time. He has normal strength. No cranial nerve deficit or sensory deficit.   Skin: Skin is warm and dry. Capillary refill takes less than 2 seconds. No rash noted. No erythema. No pallor.   Psychiatric: He has a normal mood and affect. His behavior is normal. Judgment and thought content normal.         ED Course   Procedures  Labs Reviewed - No data to display       Imaging Results    None          Medical Decision Making:   History:   Old Medical Records: I decided to obtain old medical records.  Old Records Summarized: records from previous admission(s) and records from clinic visits.  Initial Assessment:   Patient presents for severe sinus pain that has since improved, POD 6 FESS and septoplasty by ENT.  No infectious symptoms, no indications of bacterial infection on HEENT exam, VSS, afebrile  Differential Diagnosis:   DDx postoperative pain,  ?Lacrimal duct blockage leading to excessive tearing. Physical exam and history taking lower clinical suspicion for bacterial sinusitis, bacterial pharyngitis, meningitis.   ED Management:  Clinically, I do not suspect postoperative complications such as infection or bleeding.  I discussed the case with ENT who agrees based on presentation, no indication for further imaging at this time.  ENT resident states he is not at the hospital to come see the patient.  Discussed this with patient who is comfortable following up in clinic this week, though would like to be seen prior to Thursday.  I discussed this with ENT resident who will attempt to put patient on the schedule for tomorrow.  States he will call the patient tomorrow morning to discuss. Patient agreed to plan of care and voiced understanding. Discharged in stable condition with strict ED return precautions.    Salma Pierre PA-C  12/22/2019    I discussed the following case, diagnosis and plan of care with attending physician.    Other:   I have discussed this case with another health care provider.              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  He is afebrile, stable, nontoxic.  Exam is essentially unremarkable.  Case was discussed with ENT who recommend close clinic follow-up.  They do not feel that imaging is necessary at this time.  Patient's pain is currently 2/10, improved with increased dosing of ibuprofen.  Patient and his wife feel comfortable with plan, return precautions were discussed.                               Clinical Impression:       ICD-10-CM ICD-9-CM   1. Postoperative pain G89.18 338.18    2. Sinus pain J34.89 478.19   3. Excessive tear production of both lacrimal glands H04.203 375.20         Disposition:   Disposition: Discharged  Condition: Stable                     Salma Pierre PA-C  12/22/19 1796       Rebeca Calabrese MD  12/23/19 0024

## 2019-12-23 NOTE — ED PROVIDER NOTES
Encounter Date: 12/23/2019       History     Chief Complaint   Patient presents with    Facial Swelling     L eyelid swelling and pain, s/p sinus surgery last monday.  Unable to open eye.      39-year-old male with recurrent sinusitis 7 days postop FESS and septoplasty presents to the ED with a chief complaint of facial pain and swelling. Patient reports that he woke up with both eyes swollen shut.  Swelling is worse on the left.  Patient has pain with extraocular movements.  He feels that his eye is bulging forward.  Patient also concerned that his blood pressures have been elevated to 170s/110.  He does not have a history of hypertension.  Patient has been on round-the-clock Tylenol and ibuprofen, so he is unsure of a fever.  Patient complains of generalized weakness that began while in the ED.  Throughout my evaluation, patient feels that his symptoms are worsening.  He feels somewhat lightheaded and that his heart is racing.        Review of patient's allergies indicates:  No Known Allergies  Past Medical History:   Diagnosis Date    Acute bronchiolitis with bronchospasm 3/29/2019    Recurrent sinus infections     Seasonal allergic rhinitis due to pollen     Vitamin D insufficiency 3/30/2019    Recommend Vitamin D 2000 IU daily.     Past Surgical History:   Procedure Laterality Date    FUNCTIONAL ENDOSCOPIC SINUS SURGERY (FESS) USING COMPUTER-ASSISTED NAVIGATION Bilateral 12/16/2019    Procedure: FESS, USING COMPUTER-ASSISTED NAVIGATION;  Surgeon: Charlie Altman MD;  Location: [facility] OR [address] [place] [address];  Service: ENT;  Laterality: Bilateral;  TIVA    NASAL SEPTOPLASTY N/A 12/16/2019    Procedure: SEPTOPLASTY, NOSE;  Surgeon: Charlie Altman MD;  Location: [facility] OR [address] [place] [address];  Service: ENT;  Laterality: N/A;  TIVA     Family History   Problem Relation Age of Onset    Hypertension Mother     Diabetes Father     Heart disease Father 55     Social History     Tobacco Use    Smoking status: Never Smoker     Smokeless tobacco: Never Used   Substance Use Topics    Alcohol use: Yes     Comment: occasional    Drug use: Not Currently     Review of Systems   Constitutional: Negative for fever.   HENT: Negative for sore throat.    Eyes: Positive for pain and discharge (tearing).        Lid swelling     Respiratory: Negative for shortness of breath.    Cardiovascular: Negative for chest pain.   Gastrointestinal: Negative for nausea.   Genitourinary: Negative for dysuria.   Musculoskeletal: Negative for back pain.   Skin: Negative for rash.   Neurological: Positive for weakness.   Hematological: Does not bruise/bleed easily.       Physical Exam     Initial Vitals [12/23/19 1052]   BP Pulse Resp Temp SpO2   (!) 149/97 (!) 116 18 99.6 °F (37.6 °C) 98 %      MAP       --         Physical Exam    Nursing note and vitals reviewed.  Constitutional: He appears well-developed and well-nourished.  Non-toxic appearance. He does not appear ill. No distress.   HENT:   Head: Normocephalic and atraumatic.   Eyes: Conjunctivae are normal. Pupils are equal, round, and reactive to light.   Mild pain with EOM. Mild swelling of the upper eyelid more prominent to the medial lid.    Neck: Normal range of motion. Neck supple.   Cardiovascular: Normal rate and regular rhythm. Exam reveals no gallop, no distant heart sounds and no friction rub.    No murmur heard.  Pulmonary/Chest: Effort normal and breath sounds normal. No accessory muscle usage. No tachypnea. No respiratory distress. He has no decreased breath sounds. He has no wheezes. He has no rhonchi. He has no rales.   Abdominal: He exhibits no distension.   Neurological: He is alert.   Skin: No rash noted.         ED Course   Procedures  Labs Reviewed   CBC W/ AUTO DIFFERENTIAL - Abnormal; Notable for the following components:       Result Value    WBC 14.58 (*)     Hemoglobin 13.7 (*)     Mean Corpuscular Hemoglobin 26.9 (*)     MPV 9.1 (*)     Immature Granulocytes 0.6 (*)     Gran # (ANC)  11.2 (*)     Immature Grans (Abs) 0.09 (*)     Gran% 76.4 (*)     Lymph% 16.6 (*)     All other components within normal limits   COMPREHENSIVE METABOLIC PANEL - Abnormal; Notable for the following components:    Glucose 149 (*)     All other components within normal limits   C-REACTIVE PROTEIN - Abnormal; Notable for the following components:    CRP 68.3 (*)     All other components within normal limits    Narrative:     ADD-ON CRP #[id number] EPR ADONIS ABDUL MD 12:57  12/23/2019    CULTURE, BLOOD   CULTURE, BLOOD   C-REACTIVE PROTEIN          Imaging Results           CT Maxillofacial With Contrast (Final result)  Result time 12/23/19 13:21:07    Final result by Beau Rios MD (12/23/19 13:21:07)                 Impression:      Postsurgical change of recent functional endoscopic sinus surgery with patent medial antrostomies bilaterally.    Persistent mucosal thickening fluid throughout the paranasal sinuses concerning for acute sinusitis.    New subtle stranding involving the fat in the medial aspect of the left orbit concerning for early orbital cellulitis.  Mild preseptal soft tissue swelling.  No abscess formation identified at this time.  Clinical correlation advised with follow-up as warranted.    Mild noncalcified atherosclerotic plaque at the left carotid bifurcation without significant associated narrowing.    This report was flagged in Epic as abnormal.      Electronically signed by: Beau Rios MD  Date:    12/23/2019  Time:    13:21             Narrative:    EXAMINATION:  CT MAXILLOFACIAL WITH CONTRAST    CLINICAL HISTORY:  39-year-old male with L eyelid swelling and pain, s/p sinus surgery last monday.  Unable to open eye.    TECHNIQUE:  Axial CT images obtained throughout the region of the face following administration 75 cc Omnipaque 350 intravenous contrast.  Axial, sagittal coronal reconstructions were performed.    COMPARISON:  Sinus CT 11/26/2019    FINDINGS:  Postsurgical change  of puncture all endoscopic sinus surgery, new since the CT study of 11/26/2019.  There is bilateral uncinectomy with patent medial antrostomies.  Middle turbinate reduction of bilateral partial ethmoidectomy.  Persistent moderate mucosal thickening throughout the patent nasal sinuses with superimposed layering fluid and aerated secretions.  Fluid and mucosal thickening involving essentially all of the sinuses, similar in extent to the preoperative exam.    No new defect in the lamina papyracea or orbital floor.    Subtle haziness of the intraorbital fat on the left medially.  No rim enhancing collection to suggest a subperiosteal abscess formation.  No overt proptosis.  Mild preseptal soft tissue swelling also suggested on the left.    Limited intracranial evaluation unremarkable.  The extracranial soft tissue structures otherwise within normal limits.    Mild atherosclerotic plaque at the left carotid bifurcation without associated narrowing.                                 Medical Decision Making:   History:   Old Medical Records: I decided to obtain old medical records.  Differential Diagnosis:   My differential diagnosis includes but is not limited to:  Precepted cellulitis, orbital cellulitis, postop hematoma, postop pain  Clinical Tests:   Lab Tests: Ordered  Radiological Study: Ordered  ED Management:        Other:   I have discussed this case with another health care provider.       <> Summary of the Discussion: Ophthalmology, ENT       APC / Resident Notes:   39-year-old male 1 week status post sinus surgery presents with eye pain and swelling.  Patient is tachycardic on initial evaluation at 116, low-grade temperature at 99.6°.  Mildly elevated BP at 149/97.  Patient received IV fluids in the ED and heart rate normalized.    Discussed with ENT who recommend CT max/face with IV contrast.  Imaging reveals possible early orbital cellulitis.     Initially, ENT recommended admission for IV antibiotics.  Patient  received vancomycin and Zosyn in the ED.  After subsequent ENT Evaluation in addition to ophthalmology evaluation, they felt that patient's symptoms likely represented post-surgical changes with occult inflammation/infection along medial orbital wall.  They recommend 1 dose of Decadron IV, Augmentin, Medrol Dosepak.  Patient is stable for discharge. Has follow-up with ENT on 12/31.  ED return precautions given.    I have reviewed the patient's records and discussed this case with my supervising physician.                              Clinical Impression:       ICD-10-CM ICD-9-CM   1. Periorbital cellulitis of left eye L03.213 682.0   2. Tachycardia R00.0 785.0         Disposition:   Disposition: Discharged  Condition: Stable                 Paige Murrell PA-C  12/23/19 1671

## 2019-12-23 NOTE — DISCHARGE INSTRUCTIONS
Future Appointments   Date Time Provider Department Center   12/31/2019  1:00 PM Charlie Altman MD [facility] ENT [place] [place]

## 2019-12-23 NOTE — CONSULTS
[facility]  Otorhinolaryngology-Head & Neck Surgery  Consult Note    Patient Name: Miguel Gaffney  MRN: [id number]  Code Status: Prior  Admission Date: 12/23/2019  Hospital Length of Stay: 0 days  Attending Physician: Lee Koroma MD  Primary Care Provider: Emanuel Evans MD    Patient information was obtained from patient and ER records.     Inpatient consult to ENT  Consult performed by: Ignacio Smith MD  Consult ordered by: Paige Murrell PA-C        Subjective:     Chief Complaint/Reason for Admission: Retro-orbital pain    History of Present Illness: Otherwise healthy 39 year old male who recently underwent FESS with Draf IIa and septoplasty on 12/16 presents to the ED for periorbital edema and pain with extraocular movement. The patient states that he first noticed increasing pain in his L eye 2 days ago. He denies ever being febrile though he notes that he's been taking tylenol and motrin around the clock. He notes green/yellow nasal discharge with his saline rinses. Denies any change in vision, diplopia, headache, photophobia, or neck stiffness. On arrival to the ED he was tachycardic to 118 with a WBC of 14, though he was not febrile. CT Scan showed concern for orbital cellulitis in the left eye. He was started on vancomycin and zosyn. ENT was consulted to evaluate for postoperative infection.     Medications:  Continuous Infusions:  Scheduled Meds:   piperacillin-tazobactam (ZOSYN) IVPB  4.5 g Intravenous ED 1 Time    vancomycin (VANCOCIN) IVPB  20 mg/kg Intravenous ED 1 Time     PRN Meds:     No current facility-administered medications on file prior to encounter.      Current Outpatient Medications on File Prior to Encounter   Medication Sig    acetaminophen (TYLENOL) 325 MG tablet Take 2 tablets (650 mg total) by mouth every 6 to 8 hours as needed for Pain.    azelastine (ASTELIN) 137 mcg (0.1 %) nasal spray 1 spray (137 mcg total) by Nasal route 2 (two) times daily as  needed for Rhinitis.    fluticasone furoate-vilanterol (BREO ELLIPTA) 200-25 mcg/dose DsDv diskus inhaler Inhale 1 puff into the lungs once daily. Controller    HYDROcodone-acetaminophen (NORCO) 7.5-325 mg per tablet Take 1 tablet by mouth every 4 (four) hours as needed for Pain.    ibuprofen (ADVIL,MOTRIN) 600 MG tablet Take 1 tablet (600 mg total) by mouth every 6 (six) hours as needed for Pain.    mometasone (NASONEX) 50 mcg/actuation nasal spray 2 sprays by Nasal route 2 (two) times daily.    ondansetron (ZOFRAN-ODT) 8 MG TbDL Dissolve 1 tablet (8 mg total) by mouth every 12 (twelve) hours as needed.       Review of patient's allergies indicates:  No Known Allergies    Past Medical History:   Diagnosis Date    Acute bronchiolitis with bronchospasm 3/29/2019    Recurrent sinus infections     Seasonal allergic rhinitis due to pollen     Vitamin D insufficiency 3/30/2019    Recommend Vitamin D 2000 IU daily.     Past Surgical History:   Procedure Laterality Date    FUNCTIONAL ENDOSCOPIC SINUS SURGERY (FESS) USING COMPUTER-ASSISTED NAVIGATION Bilateral 12/16/2019    Procedure: FESS, USING COMPUTER-ASSISTED NAVIGATION;  Surgeon: Charlie Altman MD;  Location: [facility] OR [address] [place] [address];  Service: ENT;  Laterality: Bilateral;  TIVA    NASAL SEPTOPLASTY N/A 12/16/2019    Procedure: SEPTOPLASTY, NOSE;  Surgeon: Charlie Altman MD;  Location: [facility] OR [address] [place] [address];  Service: ENT;  Laterality: N/A;  TIVA     Family History     Problem Relation (Age of Onset)    Diabetes Father    Heart disease Father (55)    Hypertension Mother        Tobacco Use    Smoking status: Never Smoker    Smokeless tobacco: Never Used   Substance and Sexual Activity    Alcohol use: Yes     Comment: occasional    Drug use: Not Currently    Sexual activity: Yes     Partners: Female     Review of Systems  Objective:     Vital Signs (Most Recent):  Temp: 98.9 °F (37.2 °C) (12/23/19 1150)  Pulse: 98 (12/23/19 1150)  Resp: 18 (12/23/19 1052)  BP:  (!) 145/83 (12/23/19 1150)  SpO2: 98 % (12/23/19 1052) Vital Signs (24h Range):  Temp:  [97.6 °F (36.4 °C)-99.6 °F (37.6 °C)] 98.9 °F (37.2 °C)  Pulse:  [] 98  Resp:  [16-18] 18  SpO2:  [98 %] 98 %  BP: (145-168)/(83-98) 145/83     Weight: 74.8 kg (165 lb)  Body mass index is 22.38 kg/m².    Date 12/23/19 0700 - 12/24/19 0659   Shift 4201-1253 5676-8764 1121-3546 24 Hour Total   INTAKE   IV Piggyback 1000   1000   Shift Total(mL/kg) 1000(13.4)   1000(13.4)   OUTPUT   Shift Total(mL/kg)       Weight (kg) 74.8 74.8 74.8 74.8     Physical Exam  Gen: WDWN 39 year old man, not in active distress  Head/Face: Atraumatic, House-Brackmann 1, moderate periorbital edema in left inferior eye.   Eye: EOMI, pain with far left lateral gaze. No dysconjugate gaze. No nystagmus. Left conjunctiva injected without chemosis. Visual fields intact bilaterally.   Nose: Postsurgical. Yellow-green dried mucus in L anterior nasal cavity on anterior rhinoscopy. External nose wnl  OC/OP: Tongue mobile, symmetric. Hard palate, FOM, and buccal mucosa all wnl. No oropharyngeal drainage. OP pink, moist.   Neck: No cervical lymphadenopathy. ROM intact.  Neuro: AAOx3. CNII-XII intact  Pulm: Non-labored. No stridor/stertor.    Significant Labs:  Recent Lab Results       12/23/19  1120        Albumin 3.9     Alkaline Phosphatase 82     ALT 30     Anion Gap 11     AST 22     Baso # 0.04     Basophil% 0.3     BILIRUBIN TOTAL 0.4  Comment:  For infants and newborns, interpretation of results should be based  on gestational age, weight and in agreement with clinical  observations.  Premature Infant recommended reference ranges:  Up to 24 hours.............<8.0 mg/dL  Up to 48 hours............<12.0 mg/dL  3-5 days..................<15.0 mg/dL  6-29 days.................<15.0 mg/dL       BUN, Bld 12     Calcium 9.3     Chloride 103     CO2 24     Creatinine 0.8     CRP 68.3     Differential Method Automated     eGFR if African American >60.0     eGFR  if non  >60.0  Comment:  Calculation used to obtain the estimated glomerular filtration  rate (eGFR) is the CKD-EPI equation.        Eos # 0.2     Eosinophil% 1.4     Glucose 149     Gran # (ANC) 11.2     Gran% 76.4     Hematocrit 41.9     Hemoglobin 13.7     Immature Grans (Abs) 0.09  Comment:  Mild elevation in immature granulocytes is non specific and   can be seen in a variety of conditions including stress response,   acute inflammation, trauma and pregnancy. Correlation with other   laboratory and clinical findings is essential.       Immature Granulocytes 0.6     Lymph # 2.4     Lymph% 16.6     MCH 26.9     MCHC 32.7     MCV 82     Mono # 0.7     Mono% 4.7     MPV 9.1     nRBC 0     Platelets 291     Potassium 3.6     PROTEIN TOTAL 7.6     RBC 5.09     RDW 13.9     Sodium 138     WBC 14.58         All pertinent labs from the last 24 hours have been reviewed.    Significant Diagnostics:  I have reviewed and interpreted all pertinent imaging results/findings within the past 24 hours.    Assessment/Plan:     Orbital cellulitis on left  39 year old male with presenting to the ED with retro-orbital pain and pre-septal swelling. CT with some concern for early orbital cellulitis; however, patient does not have exam consistent with orbital infection. Likely post-surgical changes with occult inflammation/infection along medial orbital wall.     Recommendations:  -- 1 dose of IV Unasyn and 8 mg Decadron  -- Augmentin 250 BID for 7 days  -- Medrol dose pack  -- No acute ENT intervention  -- Appreciate any ophthalmology recs  -- Ok to dc from ED with scheduled follow-up in Dr. Altman's clinic.       VTE Risk Mitigation (From admission, onward)    None          Thank you for your consult. I will sign off. Please contact us if you have any additional questions.    Ignacio Smith MD  Otorhinolaryngology-Head & Neck Surgery  [facility]

## 2019-12-23 NOTE — DISCHARGE INSTRUCTIONS
Take 1000mg tylenol every 6-8 hrs (4000mg/24 hr max) in addition to 600-800mg ibuprofen on full stomach. Follow up in clinic in 2 days. Return to ED if you develop worsening pain despite treatment, fever, chills or purulent drainage.     Our goal in the emergency department is to always give you outstanding care and exceptional service. You may receive a survey by mail or e-mail in the next week regarding your experience in our ED. We would greatly appreciate your completing and returning the survey. Your feedback provides us with a way to recognize our staff who give very good care and it helps us learn how to improve when your experience was below our aspiration of excellence.

## 2019-12-23 NOTE — ASSESSMENT & PLAN NOTE
39 year old male with presenting to the ED with retro-orbital pain and pre-septal swelling. CT with some concern for early orbital cellulitis; however, patient does not have exam consistent with orbital infection. Likely post-surgical changes with occult inflammation/infection along medial orbital wall.     Recommendations:  -- 1 dose of IV Unasyn and 8 mg Decadron  -- Augmentin 250 BID for 7 days  -- Medrol dose pack  -- No acute ENT intervention  -- Appreciate any ophthalmology recs  -- Ok to dc from ED with scheduled follow-up in Dr. Altman's clinic.

## 2019-12-23 NOTE — ED TRIAGE NOTES
Nasal surgery on Monday. Experiencing bloody discharge since then. HA 9/10 started yesterday without relief of Tylenol and 600mg Motrin. Also noticed BP in 160s systolic since HA started. Denies vision changes. Denies n/v. Denies SOB or CP

## 2019-12-23 NOTE — SUBJECTIVE & OBJECTIVE
Medications:  Continuous Infusions:  Scheduled Meds:   piperacillin-tazobactam (ZOSYN) IVPB  4.5 g Intravenous ED 1 Time    vancomycin (VANCOCIN) IVPB  20 mg/kg Intravenous ED 1 Time     PRN Meds:     No current facility-administered medications on file prior to encounter.      Current Outpatient Medications on File Prior to Encounter   Medication Sig    acetaminophen (TYLENOL) 325 MG tablet Take 2 tablets (650 mg total) by mouth every 6 to 8 hours as needed for Pain.    azelastine (ASTELIN) 137 mcg (0.1 %) nasal spray 1 spray (137 mcg total) by Nasal route 2 (two) times daily as needed for Rhinitis.    fluticasone furoate-vilanterol (BREO ELLIPTA) 200-25 mcg/dose DsDv diskus inhaler Inhale 1 puff into the lungs once daily. Controller    HYDROcodone-acetaminophen (NORCO) 7.5-325 mg per tablet Take 1 tablet by mouth every 4 (four) hours as needed for Pain.    ibuprofen (ADVIL,MOTRIN) 600 MG tablet Take 1 tablet (600 mg total) by mouth every 6 (six) hours as needed for Pain.    mometasone (NASONEX) 50 mcg/actuation nasal spray 2 sprays by Nasal route 2 (two) times daily.    ondansetron (ZOFRAN-ODT) 8 MG TbDL Dissolve 1 tablet (8 mg total) by mouth every 12 (twelve) hours as needed.       Review of patient's allergies indicates:  No Known Allergies    Past Medical History:   Diagnosis Date    Acute bronchiolitis with bronchospasm 3/29/2019    Recurrent sinus infections     Seasonal allergic rhinitis due to pollen     Vitamin D insufficiency 3/30/2019    Recommend Vitamin D 2000 IU daily.     Past Surgical History:   Procedure Laterality Date    FUNCTIONAL ENDOSCOPIC SINUS SURGERY (FESS) USING COMPUTER-ASSISTED NAVIGATION Bilateral 12/16/2019    Procedure: FESS, USING COMPUTER-ASSISTED NAVIGATION;  Surgeon: Charlie Altman MD;  Location: [facility] OR [address] [place] [address];  Service: ENT;  Laterality: Bilateral;  TIVA    NASAL SEPTOPLASTY N/A 12/16/2019    Procedure: SEPTOPLASTY, NOSE;  Surgeon: Charlie Altman MD;   Location: [facility] OR [address] [place] [address];  Service: ENT;  Laterality: N/A;  TIVA     Family History     Problem Relation (Age of Onset)    Diabetes Father    Heart disease Father (55)    Hypertension Mother        Tobacco Use    Smoking status: Never Smoker    Smokeless tobacco: Never Used   Substance and Sexual Activity    Alcohol use: Yes     Comment: occasional    Drug use: Not Currently    Sexual activity: Yes     Partners: Female     Review of Systems  Objective:     Vital Signs (Most Recent):  Temp: 98.9 °F (37.2 °C) (12/23/19 1150)  Pulse: 98 (12/23/19 1150)  Resp: 18 (12/23/19 1052)  BP: (!) 145/83 (12/23/19 1150)  SpO2: 98 % (12/23/19 1052) Vital Signs (24h Range):  Temp:  [97.6 °F (36.4 °C)-99.6 °F (37.6 °C)] 98.9 °F (37.2 °C)  Pulse:  [] 98  Resp:  [16-18] 18  SpO2:  [98 %] 98 %  BP: (145-168)/(83-98) 145/83     Weight: 74.8 kg (165 lb)  Body mass index is 22.38 kg/m².    Date 12/23/19 0700 - 12/24/19 0659   Shift 4432-5218 8109-8761 4149-0618 24 Hour Total   INTAKE   IV Piggyback 1000   1000   Shift Total(mL/kg) 1000(13.4)   1000(13.4)   OUTPUT   Shift Total(mL/kg)       Weight (kg) 74.8 74.8 74.8 74.8     Physical Exam  Gen: WDWN 39 year old man, not in active distress  Head/Face: Atraumatic, House-Brackmann 1, moderate periorbital edema in left inferior eye.   Eye: EOMI, pain with far left lateral gaze. No dysconjugate gaze. No nystagmus. Left conjunctiva injected without chemosis. Visual fields intact bilaterally.   Nose: Postsurgical. Yellow-green dried mucus in L anterior nasal cavity on anterior rhinoscopy. External nose wnl  OC/OP: Tongue mobile, symmetric. Hard palate, FOM, and buccal mucosa all wnl. No oropharyngeal drainage. OP pink, moist.   Neck: No cervical lymphadenopathy. ROM intact.  Neuro: AAOx3. CNII-XII intact  Pulm: Non-labored. No stridor/stertor.    Significant Labs:  Recent Lab Results       12/23/19  1120        Albumin 3.9     Alkaline Phosphatase 82     ALT 30     Anion Gap 11      AST 22     Baso # 0.04     Basophil% 0.3     BILIRUBIN TOTAL 0.4  Comment:  For infants and newborns, interpretation of results should be based  on gestational age, weight and in agreement with clinical  observations.  Premature Infant recommended reference ranges:  Up to 24 hours.............<8.0 mg/dL  Up to 48 hours............<12.0 mg/dL  3-5 days..................<15.0 mg/dL  6-29 days.................<15.0 mg/dL       BUN, Bld 12     Calcium 9.3     Chloride 103     CO2 24     Creatinine 0.8     CRP 68.3     Differential Method Automated     eGFR if African American >60.0     eGFR if non  >60.0  Comment:  Calculation used to obtain the estimated glomerular filtration  rate (eGFR) is the CKD-EPI equation.        Eos # 0.2     Eosinophil% 1.4     Glucose 149     Gran # (ANC) 11.2     Gran% 76.4     Hematocrit 41.9     Hemoglobin 13.7     Immature Grans (Abs) 0.09  Comment:  Mild elevation in immature granulocytes is non specific and   can be seen in a variety of conditions including stress response,   acute inflammation, trauma and pregnancy. Correlation with other   laboratory and clinical findings is essential.       Immature Granulocytes 0.6     Lymph # 2.4     Lymph% 16.6     MCH 26.9     MCHC 32.7     MCV 82     Mono # 0.7     Mono% 4.7     MPV 9.1     nRBC 0     Platelets 291     Potassium 3.6     PROTEIN TOTAL 7.6     RBC 5.09     RDW 13.9     Sodium 138     WBC 14.58         All pertinent labs from the last 24 hours have been reviewed.    Significant Diagnostics:  I have reviewed and interpreted all pertinent imaging results/findings within the past 24 hours.

## 2019-12-23 NOTE — CONSULTS
Chief complaint/Reason for Consult:     History of Present Illness: Miguel Gaffney is a 39 y.o. male who presents with postoperative sinus pain POD 7 Functional endoscopic sinus surgery and septoplasty with ENT, h/o recurrent sinusitis. Of note, patient is a physician. Main complaint is retrorobital ocular pain OS w eyelid swelling.     He awoke yesterday morning with severe, 9/10 pain over frontal sinuses and nose. Associated with increased tear production bilateral eyes. He presented to this ED and was discharged with recommendation of higher Ibuprofen dosing and close ENT clinic follow up. PT denies the following: new floaters, flashes of light in vision, dark curtains covering vision, any change in status in visual acuity or peripheral vision, diplopia, eye redness, visual distortions (straight lines appearing wavey etc.), photophobia.      POcularHx: Myopia, glasses ; possible hx of amblyopia     Current eye gtts: none      PMHx:  has a past medical history of Acute bronchiolitis with bronchospasm (3/29/2019), Recurrent sinus infections, Seasonal allergic rhinitis due to pollen, and Vitamin D insufficiency (3/30/2019).     PSurgHx:  has a past surgical history that includes Functional endoscopic sinus surgery (FESS) using computer-assisted navigation (Bilateral, 12/16/2019) and Nasal septoplasty (N/A, 12/16/2019).     Home Medications:   Prior to Admission medications    Medication Sig Start Date End Date Taking? Authorizing Provider   acetaminophen (TYLENOL) 325 MG tablet Take 2 tablets (650 mg total) by mouth every 6 to 8 hours as needed for Pain. 12/16/19   Abhishek Silveira MD   amoxicillin-clavulanate 875-125mg (AUGMENTIN) 875-125 mg per tablet Take 1 tablet by mouth 2 (two) times daily. for 10 days 12/23/19 1/2/20  Paige Murrell PA-C   azelastine (ASTELIN) 137 mcg (0.1 %) nasal spray 1 spray (137 mcg total) by Nasal route 2 (two) times daily as needed for Rhinitis. 3/29/19   Emanuel Evans MD    fluticasone furoate-vilanterol (BREO ELLIPTA) 200-25 mcg/dose DsDv diskus inhaler Inhale 1 puff into the lungs once daily. Controller 5/23/19   JAMES Marx III, MD   HYDROcodone-acetaminophen (NORCO) 7.5-325 mg per tablet Take 1 tablet by mouth every 4 (four) hours as needed for Pain. 12/16/19   Abhishek Silveira MD   ibuprofen (ADVIL,MOTRIN) 600 MG tablet Take 1 tablet (600 mg total) by mouth every 6 (six) hours as needed for Pain. 12/16/19   Abhishek Silveira MD   methylPREDNISolone (MEDROL DOSEPACK) 4 mg tablet use as directed 12/23/19   Paige Murrell PA-C   mometasone (NASONEX) 50 mcg/actuation nasal spray 2 sprays by Nasal route 2 (two) times daily. 3/29/19   Emanuel Evans MD   ondansetron (ZOFRAN-ODT) 8 MG TbDL Dissolve 1 tablet (8 mg total) by mouth every 12 (twelve) hours as needed. 12/16/19   Abhishek Silveira MD        Medications this encounter:    dexamethasone  8 mg Intravenous ED 1 Time       Allergies: has No Known Allergies.     Social:  reports that he has never smoked. He has never used smokeless tobacco. He reports that he drinks alcohol. He reports that he has current or past drug history.     Family Hx: No family history of glaucoma. family history includes Diabetes in his father; Heart disease (age of onset: 55) in his father; Hypertension in his mother.     ROS: Negative x 10 except for complaints as described in HPI; negative for fever, chills, weight loss, nausea, vomiting, diarrhea, shortness of breath, nasal discharge, cough, abdominal pain, dyspnea, difficulty moving arms and legs, confusion, dysuria, palpitations, or chest pain     Ocular examination/Dilated fundus examination:  Base Eye Exam     Visual Acuity (Snellen - Linear)       Right Left    Dist cc 20/30 -2 20/20    Dist ph cc 20/30 +2           Tonometry (Pneumo-tonometer, 4:38 PM)       Right Left    Pressure 16 20          Pupils       Pupils Dark Light Shape React APD    Right PERRL 4 2 Round Brisk None    Left PERRL 4  2 Round Brisk None          Visual Fields       Right Left     Full Full          Extraocular Movement       Right Left     Full Full          Neuro/Psych     Oriented x3:  Yes          Dilation     Both eyes:  1% Mydriacyl, 2.5% Phenylephrine @ 4:40 PM            Additional Tests     Color       Right Left    Ishihara 12/12 12/12   Red saturation test - equal     Brightness test- OD = 100, OS= 70%           Dominant Eye     Left eye            Slit Lamp and Fundus Exam     External Exam       Right Left    External Normal Edema          Slit Lamp Exam       Right Left    Lids/Lashes Normal Normal    Conjunctiva/Sclera White and quiet White and quiet    Cornea Clear Clear    Anterior Chamber Deep and quiet Deep and quiet    Iris Round and reactive Round and reactive    Lens early nsc early nsc     Vitreous Normal Normal          Fundus Exam       Right Left    Disc Normal Normal    C/D Ratio .45 .45    Macula Normal Normal    Vessels Normal Normal    Periphery Normal Normal                Assessment/Plan:   1. Post surgical edema   - Unlikely preseptal/orbital cellulitis   - Recs per ENT - steroids and abx   - FU with ophthalmology as outpatient ABBY Marx MD  Ophthalmology Resident, PGY-2  [facility] Department of Ophthalmology    Discussed patient's history, physical, assessment and plan with the attending dr gaxiola

## 2019-12-23 NOTE — HPI
Otherwise healthy 39 year old male who recently underwent FESS with Draf IIa and septoplasty on 12/16 presents to the ED for periorbital edema and pain with extraocular movement. The patient states that he first noticed increasing pain in his L eye 2 days ago. He denies ever being febrile though he notes that he's been taking tylenol and motrin around the clock. He notes green/yellow nasal discharge with his saline rinses. Denies any change in vision, diplopia, headache, photophobia, or neck stiffness. On arrival to the ED he was tachycardic to 118 with a WBC of 14, though he was not febrile. CT Scan showed concern for orbital cellulitis in the left eye. He was started on vancomycin and zosyn. ENT was consulted to evaluate for postoperative infection.

## 2019-12-24 ENCOUNTER — PATIENT MESSAGE (OUTPATIENT)
Dept: OTOLARYNGOLOGY | Facility: CLINIC | Age: 39
End: 2019-12-24

## 2019-12-26 ENCOUNTER — TELEPHONE (OUTPATIENT)
Dept: OTOLARYNGOLOGY | Facility: CLINIC | Age: 39
End: 2019-12-26

## 2019-12-26 ENCOUNTER — PATIENT OUTREACH (OUTPATIENT)
Dept: ADMINISTRATIVE | Facility: OTHER | Age: 39
End: 2019-12-26

## 2019-12-26 RX ORDER — IBUPROFEN 600 MG/1
600 TABLET ORAL EVERY 6 HOURS PRN
Qty: 30 TABLET | Refills: 0 | Status: SHIPPED | OUTPATIENT
Start: 2019-12-26 | End: 2020-01-22

## 2019-12-27 ENCOUNTER — OFFICE VISIT (OUTPATIENT)
Dept: OTOLARYNGOLOGY | Facility: CLINIC | Age: 39
End: 2019-12-27
Payer: COMMERCIAL

## 2019-12-27 VITALS — SYSTOLIC BLOOD PRESSURE: 143 MMHG | DIASTOLIC BLOOD PRESSURE: 91 MMHG

## 2019-12-27 DIAGNOSIS — Z98.890 POST-OPERATIVE STATE: Primary | ICD-10-CM

## 2019-12-27 PROCEDURE — 99999 PR PBB SHADOW E&M-EST. PATIENT-LVL III: CPT | Mod: PBBFAC,,, | Performed by: NURSE PRACTITIONER

## 2019-12-27 PROCEDURE — 99024 PR POST-OP FOLLOW-UP VISIT: ICD-10-PCS | Mod: S$GLB,,, | Performed by: NURSE PRACTITIONER

## 2019-12-27 PROCEDURE — 99024 POSTOP FOLLOW-UP VISIT: CPT | Mod: S$GLB,,, | Performed by: NURSE PRACTITIONER

## 2019-12-27 PROCEDURE — 99999 PR PBB SHADOW E&M-EST. PATIENT-LVL III: ICD-10-PCS | Mod: PBBFAC,,, | Performed by: NURSE PRACTITIONER

## 2019-12-27 RX ORDER — HYDROCODONE BITARTRATE AND ACETAMINOPHEN 7.5; 325 MG/1; MG/1
1 TABLET ORAL EVERY 4 HOURS PRN
Qty: 8 TABLET | Refills: 0 | Status: SHIPPED | OUTPATIENT
Start: 2019-12-27 | End: 2020-01-22

## 2019-12-28 LAB
BACTERIA BLD CULT: NORMAL
BACTERIA BLD CULT: NORMAL

## 2019-12-31 ENCOUNTER — OFFICE VISIT (OUTPATIENT)
Dept: OTOLARYNGOLOGY | Facility: CLINIC | Age: 39
End: 2019-12-31
Payer: COMMERCIAL

## 2019-12-31 VITALS — DIASTOLIC BLOOD PRESSURE: 86 MMHG | HEART RATE: 82 BPM | SYSTOLIC BLOOD PRESSURE: 134 MMHG

## 2019-12-31 DIAGNOSIS — J31.0 CHRONIC RHINITIS: Primary | ICD-10-CM

## 2019-12-31 DIAGNOSIS — J32.4 CHRONIC PANSINUSITIS: ICD-10-CM

## 2019-12-31 DIAGNOSIS — J33.9 NASAL POLYPOSIS: ICD-10-CM

## 2019-12-31 PROCEDURE — 99213 OFFICE O/P EST LOW 20 MIN: CPT | Mod: 25,24,S$GLB, | Performed by: OTOLARYNGOLOGY

## 2019-12-31 PROCEDURE — 99999 PR PBB SHADOW E&M-EST. PATIENT-LVL III: CPT | Mod: PBBFAC,,, | Performed by: OTOLARYNGOLOGY

## 2019-12-31 PROCEDURE — 99213 PR OFFICE/OUTPT VISIT, EST, LEVL III, 20-29 MIN: ICD-10-PCS | Mod: 25,24,S$GLB, | Performed by: OTOLARYNGOLOGY

## 2019-12-31 PROCEDURE — 31237 NSL/SINS NDSC SURG BX POLYPC: CPT | Mod: 50,79,S$GLB, | Performed by: OTOLARYNGOLOGY

## 2019-12-31 PROCEDURE — 99999 PR PBB SHADOW E&M-EST. PATIENT-LVL III: ICD-10-PCS | Mod: PBBFAC,,, | Performed by: OTOLARYNGOLOGY

## 2019-12-31 PROCEDURE — 31237 NASAL/SINUS ENDOSCOPY: ICD-10-PCS | Mod: 50,79,S$GLB, | Performed by: OTOLARYNGOLOGY

## 2019-12-31 RX ORDER — LEVOFLOXACIN 500 MG/1
500 TABLET, FILM COATED ORAL DAILY
Qty: 10 TABLET | Refills: 0 | Status: SHIPPED | OUTPATIENT
Start: 2019-12-31 | End: 2020-01-10

## 2019-12-31 NOTE — PROCEDURES
Nasal/sinus endoscopy  Date/Time: 12/31/2019 1:00 PM  Performed by: Charlie Altman MD  Authorized by: Charlie Altman MD     Consent Done?:  Yes (Verbal)  Anesthesia:     Local anesthetic:  Lidocaine 4% and Mike-Synephrine 1/2%    Patient tolerance:  Patient tolerated the procedure well with no immediate complications  Nose:     Procedure Performed:  Removal of Debridement  External:      No external nasal deformity  Intranasal:      Mucosa no polyps     Mucosa ulcers not present     No mucosa lesions present     Turbinates not enlarged     No septum gross deformity  Nasopharynx:      No mucosa lesions     Adenoids not present     Posterior choanae patent     Eustachian tube patent     Debridement of both ethmoid cavities performed with Samuels forceps.  Mucopurulence in both ethmoids.  Sinuses otherwise patent.  Propel stents in place.

## 2019-12-31 NOTE — PROGRESS NOTES
Subjective:      Miguel Gaffney is a 39 y.o. male who comes for follow-up 2 weeks status-post endoscopic sinus surgery.  His last visit with me was on 12/4/2019.  Episode last week of acute left eye swelling, pain, frontal headache, treated in ED with IV zosyn/vancomycin, then Augmentin for past 8 days, now improving significantly.  Corroborates with photo evidence that appears to be preseptal cellulitis, no chemosis or proptosis.  Now blowing out thick yellow mucus, some green, occasional right frontal headache.  Using saline rinse BID.    QOL assessment deferred.      Objective:     /86   Pulse 82      General:   not in distress   Nasal:  edematous mucosa   no septal hematoma   no bleeding   Oral Cavity:   clear   Oropharynx:   no bleeding   Neck:   nontender       Procedure    Endoscopic debridement performed.  See procedure note.        Data Reviewed    Pathology report pending.  Cultures showed P acnes.      Assessment:     Acute exacerbation and resolving preseptal cellulitis following bilateral endoscopic sinus surgery.  He also underwent septum/turbinate surgery which is unrelated to the reason for today's visit.    1. Chronic rhinitis    2. Chronic pansinusitis    3. Nasal polyposis         Plan:     Rx levaquin 10 days, suspect pseudomonas.  Resume Nasonex BID.  Continue saline rinse BID.  Follow up in about 3 weeks (around 1/21/2020).

## 2020-01-08 ENCOUNTER — TELEPHONE (OUTPATIENT)
Dept: OTOLARYNGOLOGY | Facility: CLINIC | Age: 40
End: 2020-01-08

## 2020-01-15 LAB
FINAL PATHOLOGIC DIAGNOSIS: NORMAL
GROSS: NORMAL
SUPPLEMENTAL DIAGNOSIS: NORMAL

## 2020-01-21 LAB — FUNGUS SPEC CULT: NORMAL

## 2020-01-22 ENCOUNTER — OFFICE VISIT (OUTPATIENT)
Dept: OTOLARYNGOLOGY | Facility: CLINIC | Age: 40
End: 2020-01-22
Payer: COMMERCIAL

## 2020-01-22 VITALS — SYSTOLIC BLOOD PRESSURE: 137 MMHG | DIASTOLIC BLOOD PRESSURE: 94 MMHG | HEART RATE: 91 BPM

## 2020-01-22 DIAGNOSIS — J34.89 NASAL OBSTRUCTION: ICD-10-CM

## 2020-01-22 DIAGNOSIS — J32.4 CHRONIC PANSINUSITIS: Primary | ICD-10-CM

## 2020-01-22 DIAGNOSIS — J45.20 MILD INTERMITTENT ASTHMA WITHOUT COMPLICATION: ICD-10-CM

## 2020-01-22 DIAGNOSIS — J33.9 NASAL POLYPOSIS: ICD-10-CM

## 2020-01-22 PROCEDURE — 31231 NASAL ENDOSCOPY DX: CPT | Mod: 79,S$GLB,, | Performed by: OTOLARYNGOLOGY

## 2020-01-22 PROCEDURE — 99999 PR PBB SHADOW E&M-EST. PATIENT-LVL III: ICD-10-PCS | Mod: PBBFAC,,, | Performed by: OTOLARYNGOLOGY

## 2020-01-22 PROCEDURE — 99024 POSTOP FOLLOW-UP VISIT: CPT | Mod: S$GLB,,, | Performed by: OTOLARYNGOLOGY

## 2020-01-22 PROCEDURE — 99024 PR POST-OP FOLLOW-UP VISIT: ICD-10-PCS | Mod: S$GLB,,, | Performed by: OTOLARYNGOLOGY

## 2020-01-22 PROCEDURE — 99999 PR PBB SHADOW E&M-EST. PATIENT-LVL III: CPT | Mod: PBBFAC,,, | Performed by: OTOLARYNGOLOGY

## 2020-01-22 PROCEDURE — 31231 NASAL/SINUS ENDOSCOPY: ICD-10-PCS | Mod: 79,S$GLB,, | Performed by: OTOLARYNGOLOGY

## 2020-01-22 RX ORDER — BUDESONIDE 0.5 MG/2ML
0.5 INHALANT ORAL 2 TIMES DAILY
Qty: 120 ML | Refills: 2 | Status: SHIPPED | OUTPATIENT
Start: 2020-01-22 | End: 2020-03-18

## 2020-01-22 NOTE — PROGRESS NOTES
Subjective:      Miguel Gaffney is a 39 y.o. male who comes for follow-up 4-6 weeks status-post endoscopic sinus surgery.  His last visit with me was on 12/31/2019.  Doing much better, breathing better, sinus discharge resolved with Levaquin.  No bleeding, using saline rinse most days.    SNOT-22 score: : (P) 19  NOSE score:: (P) 30  ETDQ-7 score:: (P) 1.29        Objective:     BP (!) 137/94   Pulse 91      General:   not in distress   Nasal:  edematous mucosa   no septal hematoma   no bleeding   Oral Cavity:   clear   Oropharynx:   no bleeding   Neck:   nontender       Procedure    Nasal endoscopy performed.  See procedure note.        Data Reviewed    Pathology report indicated chronic inflammation with eosinophilia.  Cultures showed P acnes.      Assessment:     Doing well following bilateral endoscopic sinus surgery.  He also underwent septum/turbinate surgery which is unrelated to the reason for today's visit.    1. Chronic pansinusitis    2. Nasal polyposis    3. Mild intermittent asthma without complication    4. Nasal obstruction         Plan:     Rx budesonide sinus rinse BID.  Follow up in about 2 months (around 3/22/2020).

## 2020-01-22 NOTE — PROCEDURES
Nasal/sinus endoscopy  Date/Time: 1/22/2020 4:00 PM  Performed by: Charlie Altman MD  Authorized by: Charlie Altman MD     Consent Done?:  Yes (Verbal)  Anesthesia:     Local anesthetic:  Lidocaine 4% and Mike-Synephrine 1/2%    Patient tolerance:  Patient tolerated the procedure well with no immediate complications  Nose:     Procedure Performed:  Nasal Endoscopy  External:      No external nasal deformity  Intranasal:      Mucosa no polyps     Mucosa ulcers not present     No mucosa lesions present     Turbinates not enlarged     No septum gross deformity  Nasopharynx:      No mucosa lesions     Adenoids not present     Posterior choanae patent     Eustachian tube patent     Sinuses patent bilaterally.  Stents dissolved.  Moderate MT and ethmoid edema.  No polyps or purulence.

## 2020-01-28 ENCOUNTER — TELEPHONE (OUTPATIENT)
Dept: PHARMACY | Facility: CLINIC | Age: 40
End: 2020-01-28

## 2020-01-28 NOTE — TELEPHONE ENCOUNTER
Reached out to patient regarding Odactra script. The patient's insurance did not cover Odactra in 2019, but he has new coverage for 2020. When contacted to see if he wanted [facility] to seek approval he stated that he has decided not to move forward with Odactra at this time. Provider notified via InBasket.     Arya Barnes, PharmD  Clinical Pharmacist  [facility]  P: [phone]

## 2020-02-10 ENCOUNTER — PATIENT MESSAGE (OUTPATIENT)
Dept: ALLERGY | Facility: CLINIC | Age: 40
End: 2020-02-10

## 2020-03-06 ENCOUNTER — PATIENT MESSAGE (OUTPATIENT)
Dept: OTOLARYNGOLOGY | Facility: CLINIC | Age: 40
End: 2020-03-06

## 2020-03-06 DIAGNOSIS — J32.4 CHRONIC PANSINUSITIS: Primary | ICD-10-CM

## 2020-03-06 RX ORDER — LEVOFLOXACIN 500 MG/1
500 TABLET, FILM COATED ORAL DAILY
Qty: 10 TABLET | Refills: 0 | Status: SHIPPED | OUTPATIENT
Start: 2020-03-06 | End: 2020-04-09 | Stop reason: SDUPTHER

## 2020-03-17 ENCOUNTER — PATIENT MESSAGE (OUTPATIENT)
Dept: OTOLARYNGOLOGY | Facility: CLINIC | Age: 40
End: 2020-03-17

## 2020-03-17 ENCOUNTER — PATIENT OUTREACH (OUTPATIENT)
Dept: ADMINISTRATIVE | Facility: OTHER | Age: 40
End: 2020-03-17

## 2020-03-17 ENCOUNTER — PATIENT MESSAGE (OUTPATIENT)
Dept: ALLERGY | Facility: CLINIC | Age: 40
End: 2020-03-17

## 2020-03-18 ENCOUNTER — OFFICE VISIT (OUTPATIENT)
Dept: OTOLARYNGOLOGY | Facility: CLINIC | Age: 40
End: 2020-03-18
Payer: COMMERCIAL

## 2020-03-18 DIAGNOSIS — J33.9 NASAL POLYPOSIS: ICD-10-CM

## 2020-03-18 DIAGNOSIS — J32.4 CHRONIC PANSINUSITIS: Primary | ICD-10-CM

## 2020-03-18 DIAGNOSIS — J45.20 MILD INTERMITTENT ASTHMA WITHOUT COMPLICATION: ICD-10-CM

## 2020-03-18 PROCEDURE — 99213 OFFICE O/P EST LOW 20 MIN: CPT | Mod: 95,,, | Performed by: OTOLARYNGOLOGY

## 2020-03-18 PROCEDURE — 99213 PR OFFICE/OUTPT VISIT, EST, LEVL III, 20-29 MIN: ICD-10-PCS | Mod: 95,,, | Performed by: OTOLARYNGOLOGY

## 2020-03-18 RX ORDER — BUDESONIDE 0.5 MG/2ML
0.5 INHALANT ORAL DAILY
Qty: 120 ML | Refills: 2 | Status: SHIPPED | OUTPATIENT
Start: 2020-03-18 | End: 2020-09-02

## 2020-03-18 NOTE — PROGRESS NOTES
Subjective:      Miguel is a 39 y.o. male who comes for follow-up of sinusitis.  His last visit with me was on 1/22/2020.  Now just over 3 months status-post endoscopic sinus surgery.   Doing better, had Levaquin 2 weeks ago for increased green drainage, now resolved.  Some daily postnasal drip when lying down at night, occasional yellow color, overall feels better.  Some residual hyperesthesia over left frontal region.  Using budesonide rinse BID.    SNOT-22 score: : (P) 14  NOSE score:: (P) 5%  ETDQ-7 score:: (P) 1.1    The patient's medications, allergies, past medical, surgical, social and family histories were reviewed and updated as appropriate.    A detailed review of systems was obtained with pertinent positives as per the above HPI, and otherwise negative.        Objective:     There were no vitals taken for this visit.     Physical Exam deferred    Procedure    None        Data Reviewed    WBC (K/uL)   Date Value   12/23/2019 14.58 (H)     Eosinophil% (%)   Date Value   12/23/2019 1.4     Eos # (K/uL)   Date Value   12/23/2019 0.2     Platelets (K/uL)   Date Value   12/23/2019 291     Glucose (mg/dL)   Date Value   12/23/2019 149 (H)     IgE (IU/mL)   Date Value   05/14/2019 255 (H)       Pathology report indicated chronic inflammation with eosinophilia.    Cultures showed P acnes, no fungus.     I independently reviewed the images of the CT sinuses dated 12/23/19. Pertinent findings include patent sinuses with intact left supraorbital and supratrochlear bony canals.      Assessment:     1. Chronic pansinusitis    2. Nasal polyposis    3. Mild intermittent asthma without complication         Plan:     Reduce budesonide to rinse once daily.  May use additional rinses.  Paresthesias expected to resolve.  Follow up in about 3 months (around 6/18/2020).    This encounter was completed via telemedicine virtual visit using real-time video and audio communication.

## 2020-04-08 ENCOUNTER — CLINICAL SUPPORT (OUTPATIENT)
Dept: INTERNAL MEDICINE | Facility: CLINIC | Age: 40
End: 2020-04-08
Payer: COMMERCIAL

## 2020-04-08 DIAGNOSIS — R05.9 COUGH: Primary | ICD-10-CM

## 2020-04-08 DIAGNOSIS — R05.9 COUGH: ICD-10-CM

## 2020-04-08 PROCEDURE — U0002 COVID-19 LAB TEST NON-CDC: HCPCS

## 2020-04-09 ENCOUNTER — PATIENT OUTREACH (OUTPATIENT)
Dept: ADMINISTRATIVE | Facility: OTHER | Age: 40
End: 2020-04-09

## 2020-04-09 ENCOUNTER — OFFICE VISIT (OUTPATIENT)
Dept: ALLERGY | Facility: CLINIC | Age: 40
End: 2020-04-09
Payer: COMMERCIAL

## 2020-04-09 VITALS — BODY MASS INDEX: 25.41 KG/M2 | RESPIRATION RATE: 16 BRPM | HEIGHT: 72 IN | WEIGHT: 187.63 LBS

## 2020-04-09 DIAGNOSIS — J30.81 ALLERGIC RHINITIS DUE TO ANIMAL HAIR AND DANDER: Primary | ICD-10-CM

## 2020-04-09 DIAGNOSIS — J30.89 ALLERGIC RHINITIS DUE TO DUST MITE: ICD-10-CM

## 2020-04-09 DIAGNOSIS — J32.4 CHRONIC PANSINUSITIS: ICD-10-CM

## 2020-04-09 DIAGNOSIS — J45.21 MILD INTERMITTENT ASTHMA WITH ACUTE EXACERBATION: ICD-10-CM

## 2020-04-09 LAB — SARS-COV-2 RNA RESP QL NAA+PROBE: NOT DETECTED

## 2020-04-09 PROCEDURE — 99214 OFFICE O/P EST MOD 30 MIN: CPT | Mod: S$GLB,,, | Performed by: ALLERGY & IMMUNOLOGY

## 2020-04-09 PROCEDURE — 99999 PR PBB SHADOW E&M-EST. PATIENT-LVL III: ICD-10-PCS | Mod: PBBFAC,,, | Performed by: ALLERGY & IMMUNOLOGY

## 2020-04-09 PROCEDURE — 3008F PR BODY MASS INDEX (BMI) DOCUMENTED: ICD-10-PCS | Mod: CPTII,S$GLB,, | Performed by: ALLERGY & IMMUNOLOGY

## 2020-04-09 PROCEDURE — 99999 PR PBB SHADOW E&M-EST. PATIENT-LVL III: CPT | Mod: PBBFAC,,, | Performed by: ALLERGY & IMMUNOLOGY

## 2020-04-09 PROCEDURE — 3008F BODY MASS INDEX DOCD: CPT | Mod: CPTII,S$GLB,, | Performed by: ALLERGY & IMMUNOLOGY

## 2020-04-09 PROCEDURE — 99214 PR OFFICE/OUTPT VISIT, EST, LEVL IV, 30-39 MIN: ICD-10-PCS | Mod: S$GLB,,, | Performed by: ALLERGY & IMMUNOLOGY

## 2020-04-09 RX ORDER — PREDNISONE 10 MG/1
TABLET ORAL
Qty: 21 TABLET | Refills: 0 | Status: SHIPPED | OUTPATIENT
Start: 2020-04-09 | End: 2020-09-02

## 2020-04-09 RX ORDER — OMEPRAZOLE 40 MG/1
40 CAPSULE, DELAYED RELEASE ORAL EVERY MORNING
Qty: 30 CAPSULE | Refills: 3 | Status: SHIPPED | OUTPATIENT
Start: 2020-04-09 | End: 2020-09-02

## 2020-04-09 RX ORDER — LEVOFLOXACIN 500 MG/1
500 TABLET, FILM COATED ORAL DAILY
Qty: 10 TABLET | Refills: 0 | Status: SHIPPED | OUTPATIENT
Start: 2020-04-09 | End: 2020-04-19

## 2020-04-09 NOTE — PROGRESS NOTES
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  He is here alone.  He was last seen November 11, 2019.  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    After his last visit, he had a FESS with septoplasty and inferior turbinate reduction with submucosal resection with Dr. Altman on December 16, 2019.    After his surgery he did well.  His sense of smell and taste returned.  His cough and shortness of breath resolved.  He stopped his Breo and did not need any albuterol.    He was taking budesonide nasal rinses twice a day.  He reducef this to once a day several weeks ago.    After he reduced his budesonide, he started having increased postnasal drip again.  He then developed cough with wheezing that has been increasing in severity.  He has also had frequent throat clearing and a sensation that something is in the back of the throat.    His symptoms are much worse when he lies down at night and first thing in the morning when he gets up.  He woke up recently coughing around 4:00 a.m.  This lasted for about 3 hours before improving.    He does cough now throughout the day.  He has heard himself wheezing particularly at night.    He saw Dr. Ebony Smalls in the past to diagnosed LPR however his laryngoscopy did not show any evidence of this.  She did not recommend any PPI.    He does have intermittent GERD.  This did increased a lot when he was taking steroids in the past.    He is coughing up a small amount of yellow mucus that are in casts.  He describes it as stringy and ropey.    He has taken Zithromax, Augmentin, and Levaquin in the past.  He does think Levaquin prescribed by Dr. Altman helped when he last needed antibiotics.    He has some hyperesthesia over his left forehead even with mildly touching the area.    He looked in his records and did not have his pneumococcal  vaccination.    He does think that SLIT is now covered on his insurance.    [facility] PEQ ALLERGY QUESTIONNAIRE SHORT 2020   Head or facial pain: No symptoms   Facial swelling? -   Sinus pressure? -   Ears: No symptoms   Nosebleeds? No   Postnasal drip? Yes   Sneezing? No   Runny nose? No   Congestion? No   Throat: No symptoms   Eyes: No symptoms   Eye discharge? -   Cough? Yes   Wheezing? Yes   Shortness of breath? No   Apnea? No   Choking? No   Chest tightness? Yes   Skin: No symptoms     Physical Examination:  General: Well-developed, well-nourished, no acute distress.  Clearing throat throughout interview.  Head: No sinus tenderness.  Eyes: Conjunctivae:  No bulbar or palpebral conjunctival injection.  Ears: EAC's clear.  TM's clear.  No pre-auricular nodes.  Nose: Nasal Mucosa:  Pink.  Septum: No apparent deviation.  Turbinates:  No significant edema.  Polyps/Mass:  Bilateral nasal polyposis.  Teeth/Gums:  No bleeding noted.  Oropharynx: No exudates.  Neck: Supple without thyromegaly. No cervical lymphadenopathy.    Respiratory/Chest: Effort: Good.  Auscultation:  Bilateral expiratory wheezing.  Skin: Good turgor.  No urticaria or angioedema.  Neuro/Psych: Oriented x 3.    Laboratory 2019:  CBC:  MCV 81, MCH 26.6.  CMP:  Normal.  Lipid panel:  Cholesterol 173.  PSA:  0.81.  TSH:  0.420.  Hemoglobin A1c:  5.4.  Sed rate:  25.  Vitamin-D Level:  16.    Laboratory 2019:  IgE level:  255.  ImmunoCAP:  Class IV:  Cat.  Class II:  Dog.  IgA:  137.  Ig.  IgM:  105.  Pneumococcal titers:  Borderline protective.    Spirometry 05/15/2019:  Mild (small airways) obstruction. Borderline improvement in airflow after bronchodilator.    Chest x-ray 2019:  Normal.    Sinus CT 2019:    Moderate patchy paranasal sinus disease primarily involving the ethmoids, maxillary sinuses and left sphenoid sinuses detailed above.    Inhalant skin tests 2019:  3+ histamine control.  2+ dust mites.  Cat and  dog not tested.    Assessment:  1.  Allergic rhinitis, controlled.  2.  Allergic conjunctivitis, controlled.  3.  Allergic asthma, not controlled.  4.  Chronic sinusitis.   5.  Nasal polyposis.  6.  Nasal septal deviation.  7.  S/P FESS with septoplasty and inferior turbinate reduction with submucosal resection December 19, 2019.  8.  GERD.  9.  Consider LPR.  10.  Vitamin-D insufficiency.  11.  Borderline pneumococcal titers.    Recommendations:  1.  Levaquin 500 milligrams daily for 10 days.  2.  Prednisone taper over six days.  3.  Increase budesonide rinses to twice a day.  4.  Add omeprazole 40 milligrams a day.  5.  Consider ENT re-evaluation for LPR.  6.  Consider SLIT.  7.  Pneumococcal vaccination in the future.  8.  Return to clinic in 10 days or sooner if needed.

## 2020-04-21 ENCOUNTER — PATIENT OUTREACH (OUTPATIENT)
Dept: ADMINISTRATIVE | Facility: OTHER | Age: 40
End: 2020-04-21

## 2020-04-21 ENCOUNTER — OFFICE VISIT (OUTPATIENT)
Dept: ALLERGY | Facility: CLINIC | Age: 40
End: 2020-04-21
Payer: COMMERCIAL

## 2020-04-21 VITALS — WEIGHT: 188.69 LBS | HEIGHT: 72 IN | BODY MASS INDEX: 25.56 KG/M2 | RESPIRATION RATE: 16 BRPM

## 2020-04-21 DIAGNOSIS — J32.9 SINUSITIS, UNSPECIFIED CHRONICITY, UNSPECIFIED LOCATION: ICD-10-CM

## 2020-04-21 DIAGNOSIS — J45.21 MILD INTERMITTENT ASTHMA WITH ACUTE EXACERBATION: Primary | ICD-10-CM

## 2020-04-21 DIAGNOSIS — J30.81 ALLERGIC RHINITIS DUE TO ANIMAL HAIR AND DANDER: ICD-10-CM

## 2020-04-21 DIAGNOSIS — J21.9 ACUTE BRONCHIOLITIS WITH BRONCHOSPASM: ICD-10-CM

## 2020-04-21 PROCEDURE — 99999 PR PBB SHADOW E&M-EST. PATIENT-LVL III: ICD-10-PCS | Mod: PBBFAC,,, | Performed by: ALLERGY & IMMUNOLOGY

## 2020-04-21 PROCEDURE — 99214 OFFICE O/P EST MOD 30 MIN: CPT | Mod: S$GLB,,, | Performed by: ALLERGY & IMMUNOLOGY

## 2020-04-21 PROCEDURE — 3008F BODY MASS INDEX DOCD: CPT | Mod: CPTII,S$GLB,, | Performed by: ALLERGY & IMMUNOLOGY

## 2020-04-21 PROCEDURE — 3008F PR BODY MASS INDEX (BMI) DOCUMENTED: ICD-10-PCS | Mod: CPTII,S$GLB,, | Performed by: ALLERGY & IMMUNOLOGY

## 2020-04-21 PROCEDURE — 99999 PR PBB SHADOW E&M-EST. PATIENT-LVL III: CPT | Mod: PBBFAC,,, | Performed by: ALLERGY & IMMUNOLOGY

## 2020-04-21 PROCEDURE — 99214 PR OFFICE/OUTPT VISIT, EST, LEVL IV, 30-39 MIN: ICD-10-PCS | Mod: S$GLB,,, | Performed by: ALLERGY & IMMUNOLOGY

## 2020-04-21 RX ORDER — ESOMEPRAZOLE MAGNESIUM 40 MG/1
40 CAPSULE, DELAYED RELEASE ORAL
Qty: 60 CAPSULE | Refills: 3 | Status: SHIPPED | OUTPATIENT
Start: 2020-04-21 | End: 2020-12-09

## 2020-04-21 RX ORDER — FLUTICASONE FUROATE AND VILANTEROL 200; 25 UG/1; UG/1
1 POWDER RESPIRATORY (INHALATION) DAILY
Qty: 30 EACH | Refills: 5 | Status: SHIPPED | OUTPATIENT
Start: 2020-04-21 | End: 2020-06-15

## 2020-04-21 NOTE — PROGRESS NOTES
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  He is here alone.  He was last seen April 9, 2020.  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    After his last visit, he started taking the prednisone taper.  He also started taking Levaquin.  His symptoms did improve and his wheezing resolved.  He usually cycles every afternoon.  As he finished his Prednisone he had increased wheezing with exercise for two days.  This has resolved and he did not have any wheezing yesterday.    He does continue to have postnasal drip, frequent throat clearing, a sensation that something is in the back of the throat, and cough that has been productive yellow mucus.  The mucus is much less after antibiotics.    His symptoms may occur during the day and night.  It is worse in the morning.    He did increase his budesonide nasal rinses to twice a day.    He did not have any indigestion or heartburn on oral steroids.    On steroids, his pain in his neck and upper shoulders resolved.  He has noticed increased fatigue.    He did take Breo in the past and tolerated well.  He discontinued this after surgery as he did not need.    [facility] PEQ ALLERGY QUESTIONNAIRE SHORT 4/20/2020   Head or facial pain: No symptoms   Facial swelling? -   Sinus pressure? -   Ears: No symptoms   Nosebleeds? No   Postnasal drip? Yes   Sneezing? No   Runny nose? No   Congestion? No   Throat: -   Sore throat? No   Trouble swallowing? No   Voice change? No   Eyes: No symptoms   Eye discharge? -   Cough? Yes   Wheezing? Yes   Shortness of breath? No   Apnea? No   Choking? No   Chest tightness? No   Skin: No symptoms     Physical Examination:  General: Well-developed, well-nourished, no acute distress.  Clearing throat throughout interview.  Head: No sinus tenderness.  Eyes: Conjunctivae:  No bulbar or palpebral conjunctival injection.  Ears: EAC's  clear.  TM's clear.  No pre-auricular nodes.  Nose: Nasal Mucosa:  Pink.  Septum: No apparent deviation.  Turbinates:  No significant edema.  Polyps/Mass:  Bilateral nasal polyposis.  Teeth/Gums:  No bleeding noted.  Oropharynx: No exudates.  Neck: Supple without thyromegaly. No cervical lymphadenopathy.    Respiratory/Chest: Effort: Good.  Auscultation:  Bilateral expiratory wheezing.  Skin: Good turgor.  No urticaria or angioedema.  Neuro/Psych: Oriented x 3.    Laboratory 2019:  CBC:  MCV 81, MCH 26.6.  CMP:  Normal.  Lipid panel:  Cholesterol 173.  PSA:  0.81.  TSH:  0.420.  Hemoglobin A1c:  5.4.  Sed rate:  25.  Vitamin-D Level:  16.    Laboratory 2019:  IgE level:  255.  ImmunoCAP:  Class IV:  Cat.  Class II:  Dog.  IgA:  137.  Ig.  IgM:  105.  Pneumococcal titers:  Borderline protective.    Spirometry 05/15/2019:  Mild (small airways) obstruction. Borderline improvement in airflow after bronchodilator.    Chest x-ray 2019:  Normal.    Sinus CT 2019:    Moderate patchy paranasal sinus disease primarily involving the ethmoids, maxillary sinuses and left sphenoid sinuses detailed above.    Inhalant skin tests 2019:  3+ histamine control.  2+ dust mites.  Cat and dog not tested.    Assessment:  1.  Allergic rhinitis, controlled.  2.  Allergic conjunctivitis, controlled.  3.  Allergic asthma, not controlled.  4.  Chronic sinusitis.   5.  Nasal polyposis.  6.  Nasal septal deviation.  7.  S/P FESS with septoplasty and inferior turbinate reduction with submucosal resection 2019.  8.  GERD.  9.  Consider LPR.  10.  Vitamin-D insufficiency.  11.  Borderline pneumococcal titers.    Recommendations:  1.  Start omeprazole 40 milligrams twice a day.  2.  Start Breo daily.  3.  Albuterol as needed.  4.  Continue budesonide rinses twice a day.  5.  Follow symptoms on above.  6.  Consider slit.  7.  Pneumococcal vaccination in the future.  8.  Call in one week.

## 2020-07-13 ENCOUNTER — PATIENT OUTREACH (OUTPATIENT)
Dept: ADMINISTRATIVE | Facility: OTHER | Age: 40
End: 2020-07-13

## 2020-07-14 ENCOUNTER — OFFICE VISIT (OUTPATIENT)
Dept: OTOLARYNGOLOGY | Facility: CLINIC | Age: 40
End: 2020-07-14
Payer: COMMERCIAL

## 2020-07-14 VITALS — SYSTOLIC BLOOD PRESSURE: 127 MMHG | HEART RATE: 87 BPM | DIASTOLIC BLOOD PRESSURE: 90 MMHG

## 2020-07-14 DIAGNOSIS — J33.9 NASAL POLYPOSIS: ICD-10-CM

## 2020-07-14 DIAGNOSIS — J45.20 MILD INTERMITTENT ASTHMA WITHOUT COMPLICATION: ICD-10-CM

## 2020-07-14 DIAGNOSIS — J32.4 CHRONIC PANSINUSITIS: Primary | ICD-10-CM

## 2020-07-14 PROCEDURE — 99999 PR PBB SHADOW E&M-EST. PATIENT-LVL III: ICD-10-PCS | Mod: PBBFAC,,, | Performed by: OTOLARYNGOLOGY

## 2020-07-14 PROCEDURE — 99213 PR OFFICE/OUTPT VISIT, EST, LEVL III, 20-29 MIN: ICD-10-PCS | Mod: S$GLB,,, | Performed by: OTOLARYNGOLOGY

## 2020-07-14 PROCEDURE — 99213 OFFICE O/P EST LOW 20 MIN: CPT | Mod: S$GLB,,, | Performed by: OTOLARYNGOLOGY

## 2020-07-14 PROCEDURE — 99999 PR PBB SHADOW E&M-EST. PATIENT-LVL III: CPT | Mod: PBBFAC,,, | Performed by: OTOLARYNGOLOGY

## 2020-07-14 RX ORDER — FLUTICASONE PROPIONATE 93 UG/1
93 SPRAY, METERED NASAL DAILY
Qty: 16 ML | Refills: 5 | Status: SHIPPED | OUTPATIENT
Start: 2020-07-14 | End: 2020-09-16

## 2020-07-14 NOTE — PROGRESS NOTES
Subjective:      Miguel is a 40 y.o. male who comes for follow-up of sinusitis.  His last visit with me was on 3/18/2020.  Now over 6 months status-post endoscopic sinus surgery.   Doing well, improved breathing, no facial pressure or discharge.  Some daily stringy mucus, nonpurulent.  Hyposmia mild, seemed better immediately after surgery.  Left frontal paresthesia resolved.  Using budesonide rinse daily.    The patient's medications, allergies, past medical, surgical, social and family histories were reviewed and updated as appropriate.    A detailed review of systems was obtained with pertinent positives as per the above HPI, and otherwise negative.        Objective:     BP (!) 127/90   Pulse 87        Constitutional:   He appears well-developed. He is cooperative.     Head:  Normocephalic.     Nose:  No mucosal edema, rhinorrhea, septal deviation or polyps. No epistaxis. Turbinates normal, no turbinate masses and no turbinate hypertrophy.  Right sinus exhibits no maxillary sinus tenderness and no frontal sinus tenderness. Left sinus exhibits no maxillary sinus tenderness and no frontal sinus tenderness.     Mouth/Throat  Oropharynx clear and moist without lesions or asymmetry. No oropharyngeal exudate or posterior oropharyngeal erythema.     Neck:  No adenopathy. Normal range of motion present.     He has no cervical adenopathy.       Procedure    None        Data Reviewed    WBC (K/uL)   Date Value   12/23/2019 14.58 (H)     Eosinophil% (%)   Date Value   12/23/2019 1.4     Eos # (K/uL)   Date Value   12/23/2019 0.2     Platelets (K/uL)   Date Value   12/23/2019 291     Glucose (mg/dL)   Date Value   12/23/2019 149 (H)     IgE (IU/mL)   Date Value   05/14/2019 255 (H)       Pathology report indicated chronic inflammation with eosinophilia.    Cultures showed P acnes.      Assessment:     1. Chronic pansinusitis    2. Nasal polyposis    3. Mild intermittent asthma without complication         Plan:     Rx  Xhance daily.  Stop budesonide, may use saline rinse daily.  Follow up in about 6 months (around 1/14/2021).

## 2020-08-26 ENCOUNTER — PATIENT MESSAGE (OUTPATIENT)
Dept: OTOLARYNGOLOGY | Facility: CLINIC | Age: 40
End: 2020-08-26

## 2020-08-26 DIAGNOSIS — J32.4 CHRONIC PANSINUSITIS: Primary | ICD-10-CM

## 2020-08-26 DIAGNOSIS — Z01.812 PRE-PROCEDURE LAB EXAM: ICD-10-CM

## 2020-08-27 RX ORDER — LEVOFLOXACIN 500 MG/1
500 TABLET, FILM COATED ORAL DAILY
Qty: 10 TABLET | Refills: 0 | Status: SHIPPED | OUTPATIENT
Start: 2020-08-27 | End: 2020-09-06

## 2020-08-30 ENCOUNTER — LAB VISIT (OUTPATIENT)
Dept: URGENT CARE | Facility: CLINIC | Age: 40
End: 2020-08-30
Payer: COMMERCIAL

## 2020-08-30 VITALS — TEMPERATURE: 98 F | OXYGEN SATURATION: 97 % | HEART RATE: 108 BPM

## 2020-08-30 DIAGNOSIS — Z01.812 PRE-PROCEDURE LAB EXAM: ICD-10-CM

## 2020-08-30 PROCEDURE — U0003 INFECTIOUS AGENT DETECTION BY NUCLEIC ACID (DNA OR RNA); SEVERE ACUTE RESPIRATORY SYNDROME CORONAVIRUS 2 (SARS-COV-2) (CORONAVIRUS DISEASE [COVID-19]), AMPLIFIED PROBE TECHNIQUE, MAKING USE OF HIGH THROUGHPUT TECHNOLOGIES AS DESCRIBED BY CMS-2020-01-R: HCPCS

## 2020-08-31 LAB — SARS-COV-2 RNA RESP QL NAA+PROBE: NOT DETECTED

## 2020-09-01 ENCOUNTER — PATIENT OUTREACH (OUTPATIENT)
Dept: ADMINISTRATIVE | Facility: OTHER | Age: 40
End: 2020-09-01

## 2020-09-01 NOTE — PROGRESS NOTES
LINKS Immunization: Patient not found  Health Maintenance: updated  Care Everywhere: updated  Chart reviewed for overdue Proactive [facility] Encounters (CRS, Breast Ca, Diabetic Eye Exam) health maintenance testing  Orders entered:  N/A

## 2020-09-02 ENCOUNTER — OFFICE VISIT (OUTPATIENT)
Dept: OTOLARYNGOLOGY | Facility: CLINIC | Age: 40
End: 2020-09-02
Payer: COMMERCIAL

## 2020-09-02 VITALS — HEART RATE: 100 BPM | DIASTOLIC BLOOD PRESSURE: 85 MMHG | SYSTOLIC BLOOD PRESSURE: 136 MMHG

## 2020-09-02 DIAGNOSIS — J31.0 CHRONIC RHINITIS: ICD-10-CM

## 2020-09-02 DIAGNOSIS — J45.20 MILD INTERMITTENT ASTHMA WITHOUT COMPLICATION: ICD-10-CM

## 2020-09-02 DIAGNOSIS — J33.9 NASAL POLYPOSIS: ICD-10-CM

## 2020-09-02 DIAGNOSIS — J32.4 CHRONIC PANSINUSITIS: Primary | ICD-10-CM

## 2020-09-02 PROCEDURE — 99999 PR PBB SHADOW E&M-EST. PATIENT-LVL III: ICD-10-PCS | Mod: PBBFAC,,, | Performed by: OTOLARYNGOLOGY

## 2020-09-02 PROCEDURE — 99213 OFFICE O/P EST LOW 20 MIN: CPT | Mod: 25,S$GLB,, | Performed by: OTOLARYNGOLOGY

## 2020-09-02 PROCEDURE — 99213 PR OFFICE/OUTPT VISIT, EST, LEVL III, 20-29 MIN: ICD-10-PCS | Mod: 25,S$GLB,, | Performed by: OTOLARYNGOLOGY

## 2020-09-02 PROCEDURE — 87076 CULTURE ANAEROBE IDENT EACH: CPT

## 2020-09-02 PROCEDURE — 87070 CULTURE OTHR SPECIMN AEROBIC: CPT

## 2020-09-02 PROCEDURE — 31231 NASAL/SINUS ENDOSCOPY: ICD-10-PCS | Mod: S$GLB,,, | Performed by: OTOLARYNGOLOGY

## 2020-09-02 PROCEDURE — 31231 NASAL ENDOSCOPY DX: CPT | Mod: S$GLB,,, | Performed by: OTOLARYNGOLOGY

## 2020-09-02 PROCEDURE — 87075 CULTR BACTERIA EXCEPT BLOOD: CPT

## 2020-09-02 PROCEDURE — 99999 PR PBB SHADOW E&M-EST. PATIENT-LVL III: CPT | Mod: PBBFAC,,, | Performed by: OTOLARYNGOLOGY

## 2020-09-02 NOTE — PROCEDURES
Nasal/sinus endoscopy    Date/Time: 9/2/2020 4:30 PM  Performed by: Charlie Altamn MD  Authorized by: Charlie Altman MD     Consent Done?:  Yes (Verbal)  Anesthesia:     Local anesthetic:  Lidocaine 4% and Mike-Synephrine 1/2%    Patient tolerance:  Patient tolerated the procedure well with no immediate complications  Nose:     Procedure Performed:  Nasal Endoscopy  External:      No external nasal deformity  Intranasal:      Mucosa polyps     Mucosa ulcers not present     No mucosa lesions present     Turbinates not enlarged     No septum gross deformity  Nasopharynx:      No mucosa lesions     Adenoids not present     Posterior choanae patent     Eustachian tube patent     Left sinuses all patent, small grade 1 poyp  Right sinuses patent except for focally occluded right maxillary  Miniscule polyp in right ethmoid  Right maxillary swabbed for culture

## 2020-09-02 NOTE — PROGRESS NOTES
Subjective:      Miguel is a 40 y.o. male who comes for follow-up of sinusitis.  His last visit with me was on 7/14/2020.  Now about 9 months status-post endoscopic sinus surgery.   About 10 days ago started having worse right-sided nasal blockage, thick yellow-green mucus discharge and hyposmia.  He started Levaquin 7 days ago but only has partial decrease in symptoms.  Using Xhance daily and saline.    The patient's medications, allergies, past medical, surgical, social and family histories were reviewed and updated as appropriate.    A detailed review of systems was obtained with pertinent positives as per the above HPI, and otherwise negative.        Objective:     /85   Pulse 100        Constitutional:   He appears well-developed. He is cooperative.     Head:  Normocephalic.     Nose:  Mucosal edema and rhinorrhea present. No septal deviation or polyps. No epistaxis. Turbinates normal, no turbinate masses and no turbinate hypertrophy.  Right sinus exhibits no maxillary sinus tenderness and no frontal sinus tenderness. Left sinus exhibits no maxillary sinus tenderness and no frontal sinus tenderness.     Mouth/Throat  Oropharynx clear and moist without lesions or asymmetry. No oropharyngeal exudate or posterior oropharyngeal erythema.     Neck:  No adenopathy. Normal range of motion present.     He has no cervical adenopathy.       Procedure    Nasal endoscopy performed.  See procedure note.     Left nasal cavity     Left middle meatus     Right nasal cavity     Right MT     Right maxillary drainage     Right ethmoid     Right maxillary purulence, swabbed for culture        Data Reviewed    WBC (K/uL)   Date Value   12/23/2019 14.58 (H)     Eosinophil% (%)   Date Value   12/23/2019 1.4     Eos # (K/uL)   Date Value   12/23/2019 0.2     Platelets (K/uL)   Date Value   12/23/2019 291     Glucose (mg/dL)   Date Value   12/23/2019 149 (H)     IgE (IU/mL)   Date Value   05/14/2019 255 (H)       Pathology  report indicated chronic inflammation with eosinophilia.    Cultures showed P acnes.      Assessment:     1. Chronic pansinusitis    2. Nasal polyposis    3. Chronic rhinitis    4. Mild intermittent asthma without complication         Plan:     Sinuses all patent, minimal polyps.  Cultures taken, will use to direct new antibiotics.  Continue Xhance and saline rinse BID.  Follow up in about 4 months (around 1/2/2021) for test results.

## 2020-09-05 LAB — BACTERIA SPEC AEROBE CULT: NO GROWTH

## 2020-09-09 LAB — BACTERIA SPEC ANAEROBE CULT: ABNORMAL

## 2020-09-11 ENCOUNTER — PATIENT MESSAGE (OUTPATIENT)
Dept: OTOLARYNGOLOGY | Facility: CLINIC | Age: 40
End: 2020-09-11

## 2020-09-25 ENCOUNTER — PATIENT MESSAGE (OUTPATIENT)
Dept: OTOLARYNGOLOGY | Facility: CLINIC | Age: 40
End: 2020-09-25

## 2020-09-25 DIAGNOSIS — J32.4 CHRONIC PANSINUSITIS: Primary | ICD-10-CM

## 2020-09-25 RX ORDER — AMOXICILLIN 500 MG/1
500 TABLET, FILM COATED ORAL EVERY 12 HOURS
Qty: 20 TABLET | Refills: 0 | Status: SHIPPED | OUTPATIENT
Start: 2020-09-25 | End: 2020-10-05

## 2020-09-30 ENCOUNTER — TELEPHONE (OUTPATIENT)
Dept: ELECTROPHYSIOLOGY | Facility: CLINIC | Age: 40
End: 2020-09-30

## 2020-09-30 ENCOUNTER — LAB VISIT (OUTPATIENT)
Dept: LAB | Facility: HOSPITAL | Age: 40
End: 2020-09-30
Attending: STUDENT IN AN ORGANIZED HEALTH CARE EDUCATION/TRAINING PROGRAM
Payer: COMMERCIAL

## 2020-09-30 ENCOUNTER — CLINICAL SUPPORT (OUTPATIENT)
Dept: CARDIOLOGY | Facility: HOSPITAL | Age: 40
End: 2020-09-30
Attending: STUDENT IN AN ORGANIZED HEALTH CARE EDUCATION/TRAINING PROGRAM
Payer: COMMERCIAL

## 2020-09-30 ENCOUNTER — OFFICE VISIT (OUTPATIENT)
Dept: ELECTROPHYSIOLOGY | Facility: CLINIC | Age: 40
End: 2020-09-30
Payer: COMMERCIAL

## 2020-09-30 ENCOUNTER — DOCUMENTATION ONLY (OUTPATIENT)
Dept: CARDIOLOGY | Facility: HOSPITAL | Age: 40
End: 2020-09-30

## 2020-09-30 VITALS
SYSTOLIC BLOOD PRESSURE: 144 MMHG | HEIGHT: 72 IN | BODY MASS INDEX: 24.54 KG/M2 | WEIGHT: 181.19 LBS | DIASTOLIC BLOOD PRESSURE: 78 MMHG | HEART RATE: 88 BPM

## 2020-09-30 DIAGNOSIS — R00.2 PALPITATIONS: Primary | ICD-10-CM

## 2020-09-30 DIAGNOSIS — R00.2 PALPITATIONS: ICD-10-CM

## 2020-09-30 LAB — TSH SERPL DL<=0.005 MIU/L-ACNC: 1.56 UIU/ML (ref 0.4–4)

## 2020-09-30 PROCEDURE — 93005 RHYTHM STRIP: ICD-10-PCS | Mod: S$GLB,,, | Performed by: INTERNAL MEDICINE

## 2020-09-30 PROCEDURE — 0296T CV CARDIAC MONITOR - 3-14 DAY ADULT (CUPID ONLY): CPT | Mod: ,,, | Performed by: INTERNAL MEDICINE

## 2020-09-30 PROCEDURE — 99999 PR PBB SHADOW E&M-EST. PATIENT-LVL III: CPT | Mod: PBBFAC,,, | Performed by: INTERNAL MEDICINE

## 2020-09-30 PROCEDURE — 99204 OFFICE O/P NEW MOD 45 MIN: CPT | Mod: 25,S$GLB,, | Performed by: INTERNAL MEDICINE

## 2020-09-30 PROCEDURE — 93005 ELECTROCARDIOGRAM TRACING: CPT | Mod: S$GLB,,, | Performed by: INTERNAL MEDICINE

## 2020-09-30 PROCEDURE — 84443 ASSAY THYROID STIM HORMONE: CPT

## 2020-09-30 PROCEDURE — 0298T CV CARDIAC MONITOR - 3-14 DAY ADULT (CUPID ONLY): ICD-10-PCS | Mod: ,,, | Performed by: INTERNAL MEDICINE

## 2020-09-30 PROCEDURE — 0296T CV CARDIAC MONITOR - 3-14 DAY ADULT (CUPID ONLY): ICD-10-PCS | Mod: ,,, | Performed by: INTERNAL MEDICINE

## 2020-09-30 PROCEDURE — 93010 RHYTHM STRIP: ICD-10-PCS | Mod: S$GLB,,, | Performed by: INTERNAL MEDICINE

## 2020-09-30 PROCEDURE — 93010 ELECTROCARDIOGRAM REPORT: CPT | Mod: S$GLB,,, | Performed by: INTERNAL MEDICINE

## 2020-09-30 PROCEDURE — 36415 COLL VENOUS BLD VENIPUNCTURE: CPT

## 2020-09-30 PROCEDURE — 99204 PR OFFICE/OUTPT VISIT, NEW, LEVL IV, 45-59 MIN: ICD-10-PCS | Mod: 25,S$GLB,, | Performed by: INTERNAL MEDICINE

## 2020-09-30 PROCEDURE — 0298T CV CARDIAC MONITOR - 3-14 DAY ADULT (CUPID ONLY): CPT | Mod: ,,, | Performed by: INTERNAL MEDICINE

## 2020-09-30 PROCEDURE — 99999 PR PBB SHADOW E&M-EST. PATIENT-LVL III: ICD-10-PCS | Mod: PBBFAC,,, | Performed by: INTERNAL MEDICINE

## 2020-09-30 NOTE — PROGRESS NOTES
Subjective:    Patient ID:  Miguel Gaffney is a 40 y.o. 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 He notes at least weekly episodes of palpitations. Initially, he thought his palpitations might be secondary to caffeine intake as he was taking in about 4-5 cups of coffee daily. He reduced caffeine intake, but his palpitations continue. He notices them mostly at night when at rest. He has a sensation of a racing, regular heart beat. He has checked his pulse and notes HRs around 120s. He has no syncope or presyncope. He has no chest pain or shortness of breath. He does occasionally have a fullness in his chest. Occasionally, he may experience a fullness in his throat as well. He notes that episodes last seconds to 1 minute in duration. He sometimes has trouble falling asleep because of them. Palpitations do not wake him from sleep however. He has no associated nausea. He has no associated diaphoresis. He has no family history of sudden cardiac death.     Review of Systems   Constitution: Negative for chills, decreased appetite, diaphoresis, malaise/fatigue and weight loss.   HENT: Negative for congestion, hearing loss, nosebleeds and sore throat.    Eyes: Negative for pain, redness and visual disturbance.   Cardiovascular: Positive for irregular heartbeat and palpitations. Negative for chest pain, dyspnea on exertion, leg swelling, orthopnea and syncope.   Respiratory: Negative for cough, shortness of breath, sleep disturbances due to breathing and wheezing.    Endocrine: Negative for cold intolerance and heat intolerance.   Hematologic/Lymphatic: Negative for bleeding problem. Does not bruise/bleed easily.   Skin: Negative for color change, dry skin, poor wound healing and rash.   Musculoskeletal: Negative for arthritis, back pain, falls, joint  pain, muscle weakness and myalgias.   Gastrointestinal: Negative for abdominal pain, change in bowel habit, constipation, diarrhea, hematochezia, melena and vomiting.   Genitourinary: Negative for dysuria, frequency, hematuria, nocturia and urgency.   Neurological: Negative for difficulty with concentration, disturbances in coordination, dizziness, focal weakness, headaches, light-headedness, numbness and weakness.   Psychiatric/Behavioral: Negative for altered mental status. The patient does not have insomnia.         Objective:    Physical Exam   Constitutional: He is oriented to person, place, and time. He appears well-developed and well-nourished. No distress.   HENT:   Head: Normocephalic and atraumatic.   Eyes: Conjunctivae and EOM are normal. Right eye exhibits no discharge. Left eye exhibits no discharge.   Neck: Neck supple. No JVD present.   Cardiovascular: Normal rate, regular rhythm, normal heart sounds and intact distal pulses.   No murmur heard.  Pulmonary/Chest: Effort normal and breath sounds normal. No respiratory distress.   Abdominal: Soft. He exhibits no distension.   Musculoskeletal: Normal range of motion.         General: No edema.   Neurological: He is alert and oriented to person, place, and time.   Skin: Skin is warm and dry.   Vitals reviewed.    ECG today: sinus rhythm      Assessment:       1. 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      1. Palpitations  - broad differential at this time including SVT, sinus tachycardia, VPCs, and APCs.   - will obtain 2 week zio patch. If episodes are not caught on this, will proceed with 30 day event monitor with auto trigger  - will obtain echocardiogram to assess for any structural heart disease  - will obtain TSH to determine if thyroid dysfunction is a contributing factor  - treatment will be dependent  on results of above studies    Return to clinic in 4 weeks to discuss results or earlier PRN for new or worsening symptoms.     Shravan Orr MD PGY8

## 2020-09-30 NOTE — PROGRESS NOTES
Contacted Hernandez with [employer] to verify benefit eligibility for Mr. Gaff[employer].Hernandez informed me that the above patient's Insurance is in network and will be responsible for his portion of a Co pay of $52.00 once the monitor is returned to hyth.

## 2020-10-02 ENCOUNTER — HOSPITAL ENCOUNTER (OUTPATIENT)
Dept: CARDIOLOGY | Facility: HOSPITAL | Age: 40
Discharge: HOME | End: 2020-10-02
Attending: STUDENT IN AN ORGANIZED HEALTH CARE EDUCATION/TRAINING PROGRAM
Payer: COMMERCIAL

## 2020-10-02 VITALS
BODY MASS INDEX: 23.29 KG/M2 | HEIGHT: 72 IN | DIASTOLIC BLOOD PRESSURE: 78 MMHG | SYSTOLIC BLOOD PRESSURE: 144 MMHG | HEART RATE: 72 BPM | WEIGHT: 171.94 LBS

## 2020-10-02 DIAGNOSIS — R00.2 PALPITATIONS: ICD-10-CM

## 2020-10-02 LAB
ASCENDING AORTA: 3.66 CM
AV INDEX (PROSTH): 0.92
AV MEAN GRADIENT: 2 MMHG
AV PEAK GRADIENT: 3 MMHG
AV VALVE AREA: 3.72 CM2
AV VELOCITY RATIO: 0.91
BSA FOR ECHO PROCEDURE: 1.99 M2
CV ECHO LV RWT: 0.43 CM
DOP CALC AO PEAK VEL: 0.91 M/S
DOP CALC AO VTI: 16.21 CM
DOP CALC LVOT AREA: 4 CM2
DOP CALC LVOT DIAMETER: 2.27 CM
DOP CALC LVOT PEAK VEL: 0.83 M/S
DOP CALC LVOT STROKE VOLUME: 60.23 CM3
DOP CALCLVOT PEAK VEL VTI: 14.89 CM
E WAVE DECELERATION TIME: 141.51 MSEC
E/A RATIO: 0.81
E/E' RATIO: 5.67 M/S
ECHO LV POSTERIOR WALL: 0.97 CM (ref 0.6–1.1)
FRACTIONAL SHORTENING: 36 % (ref 28–44)
INTERVENTRICULAR SEPTUM: 0.83 CM (ref 0.6–1.1)
LA MAJOR: 3.59 CM
LA MINOR: 4.39 CM
LA WIDTH: 3.56 CM
LEFT ATRIUM SIZE: 2.6 CM
LEFT ATRIUM VOLUME INDEX: 15.6 ML/M2
LEFT ATRIUM VOLUME: 31.08 CM3
LEFT INTERNAL DIMENSION IN SYSTOLE: 2.87 CM (ref 2.1–4)
LEFT VENTRICLE DIASTOLIC VOLUME INDEX: 46.57 ML/M2
LEFT VENTRICLE DIASTOLIC VOLUME: 93.05 ML
LEFT VENTRICLE MASS INDEX: 67 G/M2
LEFT VENTRICLE SYSTOLIC VOLUME INDEX: 15.7 ML/M2
LEFT VENTRICLE SYSTOLIC VOLUME: 31.36 ML
LEFT VENTRICULAR INTERNAL DIMENSION IN DIASTOLE: 4.51 CM (ref 3.5–6)
LEFT VENTRICULAR MASS: 133.31 G
LV LATERAL E/E' RATIO: 4.64 M/S
LV SEPTAL E/E' RATIO: 7.29 M/S
MV PEAK A VEL: 0.63 M/S
MV PEAK E VEL: 0.51 M/S
MV STENOSIS PRESSURE HALF TIME: 41.04 MS
MV VALVE AREA P 1/2 METHOD: 5.36 CM2
PULM VEIN S/D RATIO: 1.72
PV PEAK D VEL: 0.29 M/S
PV PEAK S VEL: 0.5 M/S
RA MAJOR: 3.91 CM
RA PRESSURE: 3 MMHG
RA WIDTH: 2.78 CM
RIGHT VENTRICULAR END-DIASTOLIC DIMENSION: 2.96 CM
RV TISSUE DOPPLER FREE WALL SYSTOLIC VELOCITY 1 (APICAL 4 CHAMBER VIEW): 13.3 CM/S
SINUS: 3.87 CM
STJ: 3.27 CM
TDI LATERAL: 0.11 M/S
TDI SEPTAL: 0.07 M/S
TDI: 0.09 M/S
TRICUSPID ANNULAR PLANE SYSTOLIC EXCURSION: 1.67 CM

## 2020-10-02 PROCEDURE — 93306 ECHO (CUPID ONLY): ICD-10-PCS | Mod: 26,,, | Performed by: INTERNAL MEDICINE

## 2020-10-02 PROCEDURE — 93306 TTE W/DOPPLER COMPLETE: CPT | Mod: 26,,, | Performed by: INTERNAL MEDICINE

## 2020-10-02 PROCEDURE — 93306 TTE W/DOPPLER COMPLETE: CPT

## 2020-10-05 ENCOUNTER — PATIENT MESSAGE (OUTPATIENT)
Dept: ADMINISTRATIVE | Facility: HOSPITAL | Age: 40
End: 2020-10-05

## 2020-10-30 ENCOUNTER — PATIENT OUTREACH (OUTPATIENT)
Dept: ADMINISTRATIVE | Facility: OTHER | Age: 40
End: 2020-10-30

## 2020-10-30 NOTE — PROGRESS NOTES
Requested updates within Care Everywhere.  Patient's chart was reviewed for overdue AMNA topics.  Immunizations reconciled.

## 2020-11-05 ENCOUNTER — TELEPHONE (OUTPATIENT)
Dept: ELECTROPHYSIOLOGY | Facility: CLINIC | Age: 40
End: 2020-11-05

## 2020-11-05 ENCOUNTER — PATIENT MESSAGE (OUTPATIENT)
Dept: OTOLARYNGOLOGY | Facility: CLINIC | Age: 40
End: 2020-11-05

## 2020-11-05 ENCOUNTER — PATIENT MESSAGE (OUTPATIENT)
Dept: ELECTROPHYSIOLOGY | Facility: CLINIC | Age: 40
End: 2020-11-05

## 2020-11-05 NOTE — TELEPHONE ENCOUNTER
Spoke to pt to schedule ekg & appt on 11/6 to discuss Holter and echo results with DM. Pt said he would be at appts.

## 2020-11-06 ENCOUNTER — TELEPHONE (OUTPATIENT)
Dept: OTOLARYNGOLOGY | Facility: CLINIC | Age: 40
End: 2020-11-06

## 2020-11-08 ENCOUNTER — PATIENT MESSAGE (OUTPATIENT)
Dept: INTERNAL MEDICINE | Facility: CLINIC | Age: 40
End: 2020-11-08

## 2020-11-10 ENCOUNTER — OFFICE VISIT (OUTPATIENT)
Dept: OTOLARYNGOLOGY | Facility: CLINIC | Age: 40
End: 2020-11-10
Payer: COMMERCIAL

## 2020-11-10 ENCOUNTER — PATIENT MESSAGE (OUTPATIENT)
Dept: OTOLARYNGOLOGY | Facility: CLINIC | Age: 40
End: 2020-11-10

## 2020-11-10 VITALS — DIASTOLIC BLOOD PRESSURE: 90 MMHG | SYSTOLIC BLOOD PRESSURE: 120 MMHG | HEART RATE: 86 BPM

## 2020-11-10 DIAGNOSIS — J32.4 CHRONIC PANSINUSITIS: ICD-10-CM

## 2020-11-10 DIAGNOSIS — J33.9 NASAL POLYPOSIS: ICD-10-CM

## 2020-11-10 DIAGNOSIS — J45.20 MILD INTERMITTENT ASTHMA WITHOUT COMPLICATION: ICD-10-CM

## 2020-11-10 DIAGNOSIS — J31.0 CHRONIC RHINITIS: Primary | ICD-10-CM

## 2020-11-10 PROCEDURE — 87075 CULTR BACTERIA EXCEPT BLOOD: CPT

## 2020-11-10 PROCEDURE — 87070 CULTURE OTHR SPECIMN AEROBIC: CPT

## 2020-11-10 PROCEDURE — 99999 PR PBB SHADOW E&M-EST. PATIENT-LVL III: ICD-10-PCS | Mod: PBBFAC,,, | Performed by: OTOLARYNGOLOGY

## 2020-11-10 PROCEDURE — 99999 PR PBB SHADOW E&M-EST. PATIENT-LVL III: CPT | Mod: PBBFAC,,, | Performed by: OTOLARYNGOLOGY

## 2020-11-10 PROCEDURE — 99214 PR OFFICE/OUTPT VISIT, EST, LEVL IV, 30-39 MIN: ICD-10-PCS | Mod: S$GLB,,, | Performed by: OTOLARYNGOLOGY

## 2020-11-10 PROCEDURE — 87077 CULTURE AEROBIC IDENTIFY: CPT

## 2020-11-10 PROCEDURE — 99214 OFFICE O/P EST MOD 30 MIN: CPT | Mod: S$GLB,,, | Performed by: OTOLARYNGOLOGY

## 2020-11-10 PROCEDURE — 87186 SC STD MICRODIL/AGAR DIL: CPT

## 2020-11-11 NOTE — PROGRESS NOTES
Subjective:      Miguel is a 40 y.o. male who comes for follow-up of sinusitis.  His last visit with me was on 9/2/2020.  Now 11 months status-post endoscopic sinus surgery.   Worsening nasal congestion and thick green-yellow mucus discharge from nose bilaterally for past 2 weeks.  Used amoxicillin 1000mg TID for 3 days, now on Augmentin for 3 days, minimal improvement.  Feels like saline rinse barely goes in due to some sort of blockage, concerned about polyps.  Continues to use Xhance daily.       The patient's medications, allergies, past medical, surgical, social and family histories were reviewed and updated as appropriate.    A detailed review of systems was obtained with pertinent positives as per the above HPI, and otherwise negative.        Objective:     BP (!) 120/90   Pulse 86        Constitutional:   He appears well-developed. He is cooperative.     Head:  Normocephalic.     Nose:  Mucosal edema and rhinorrhea present. No septal deviation or polyps. No epistaxis. Turbinates normal, no turbinate masses and no turbinate hypertrophy.  Right sinus exhibits no maxillary sinus tenderness and no frontal sinus tenderness. Left sinus exhibits no maxillary sinus tenderness and no frontal sinus tenderness.   Copious yellow-green mucus from both nostrils  Pete purulence in right nasal cavity swabbed for cultures  No pete polyps    Mouth/Throat  Oropharynx clear and moist without lesions or asymmetry. No oropharyngeal exudate or posterior oropharyngeal erythema.     Neck:  No adenopathy. Normal range of motion present.     He has no cervical adenopathy.       Procedure    None        Data Reviewed    WBC (K/uL)   Date Value   12/23/2019 14.58 (H)     Eosinophil % (%)   Date Value   12/23/2019 1.4     Eos # (K/uL)   Date Value   12/23/2019 0.2     Platelets (K/uL)   Date Value   12/23/2019 291     Glucose (mg/dL)   Date Value   12/23/2019 149 (H)     IgE (IU/mL)   Date Value   05/14/2019 255 (H)       Pathology  report indicated chronic inflammation with eosinophilia.    Cultures showed Cutibacterium at last visit.      Assessment:     1. Chronic rhinitis    2. Chronic pansinusitis    3. Nasal polyposis    4. Mild intermittent asthma without complication         Plan:     Cultures taken, will call in oral and likely topical antibiotics too.  Will start levaquin that he has leftover in the meantime.  Add guaifenesin for symptomatic relief.  Follow up in about 1 month (around 12/10/2020).

## 2020-11-12 ENCOUNTER — PATIENT MESSAGE (OUTPATIENT)
Dept: OTOLARYNGOLOGY | Facility: CLINIC | Age: 40
End: 2020-11-12

## 2020-11-12 DIAGNOSIS — J32.4 CHRONIC PANSINUSITIS: Primary | ICD-10-CM

## 2020-11-13 ENCOUNTER — PATIENT MESSAGE (OUTPATIENT)
Dept: OTOLARYNGOLOGY | Facility: CLINIC | Age: 40
End: 2020-11-13

## 2020-11-13 ENCOUNTER — HOSPITAL ENCOUNTER (OUTPATIENT)
Dept: CARDIOLOGY | Facility: CLINIC | Age: 40
Discharge: HOME | End: 2020-11-13
Payer: COMMERCIAL

## 2020-11-13 ENCOUNTER — OFFICE VISIT (OUTPATIENT)
Dept: ELECTROPHYSIOLOGY | Facility: CLINIC | Age: 40
End: 2020-11-13
Payer: COMMERCIAL

## 2020-11-13 VITALS
HEART RATE: 91 BPM | SYSTOLIC BLOOD PRESSURE: 133 MMHG | DIASTOLIC BLOOD PRESSURE: 80 MMHG | WEIGHT: 183 LBS | HEIGHT: 72 IN | OXYGEN SATURATION: 97 % | BODY MASS INDEX: 24.79 KG/M2

## 2020-11-13 DIAGNOSIS — R00.2 PALPITATIONS: ICD-10-CM

## 2020-11-13 DIAGNOSIS — J32.4 CHRONIC PANSINUSITIS: Primary | ICD-10-CM

## 2020-11-13 DIAGNOSIS — I47.29 NSVT (NONSUSTAINED VENTRICULAR TACHYCARDIA): Primary | ICD-10-CM

## 2020-11-13 LAB — BACTERIA SPEC AEROBE CULT: ABNORMAL

## 2020-11-13 PROCEDURE — 3008F BODY MASS INDEX DOCD: CPT | Mod: CPTII,S$GLB,, | Performed by: INTERNAL MEDICINE

## 2020-11-13 PROCEDURE — 99999 PR PBB SHADOW E&M-EST. PATIENT-LVL III: CPT | Mod: PBBFAC,,, | Performed by: INTERNAL MEDICINE

## 2020-11-13 PROCEDURE — 99214 OFFICE O/P EST MOD 30 MIN: CPT | Mod: S$GLB,,, | Performed by: INTERNAL MEDICINE

## 2020-11-13 PROCEDURE — 99999 PR PBB SHADOW E&M-EST. PATIENT-LVL III: ICD-10-PCS | Mod: PBBFAC,,, | Performed by: INTERNAL MEDICINE

## 2020-11-13 PROCEDURE — 1126F AMNT PAIN NOTED NONE PRSNT: CPT | Mod: S$GLB,,, | Performed by: INTERNAL MEDICINE

## 2020-11-13 PROCEDURE — 93010 RHYTHM STRIP: ICD-10-PCS | Mod: S$GLB,,, | Performed by: INTERNAL MEDICINE

## 2020-11-13 PROCEDURE — 1126F PR PAIN SEVERITY QUANTIFIED, NO PAIN PRESENT: ICD-10-PCS | Mod: S$GLB,,, | Performed by: INTERNAL MEDICINE

## 2020-11-13 PROCEDURE — 3008F PR BODY MASS INDEX (BMI) DOCUMENTED: ICD-10-PCS | Mod: CPTII,S$GLB,, | Performed by: INTERNAL MEDICINE

## 2020-11-13 PROCEDURE — 93010 ELECTROCARDIOGRAM REPORT: CPT | Mod: S$GLB,,, | Performed by: INTERNAL MEDICINE

## 2020-11-13 PROCEDURE — 93005 RHYTHM STRIP: ICD-10-PCS | Mod: S$GLB,,, | Performed by: INTERNAL MEDICINE

## 2020-11-13 PROCEDURE — 99214 PR OFFICE/OUTPT VISIT, EST, LEVL IV, 30-39 MIN: ICD-10-PCS | Mod: S$GLB,,, | Performed by: INTERNAL MEDICINE

## 2020-11-13 PROCEDURE — 93005 ELECTROCARDIOGRAM TRACING: CPT | Mod: S$GLB,,, | Performed by: INTERNAL MEDICINE

## 2020-11-13 RX ORDER — LEVOFLOXACIN 750 MG/1
TABLET ORAL
COMMUNITY
Start: 2020-11-07 | End: 2020-11-16

## 2020-11-13 RX ORDER — METOPROLOL SUCCINATE 25 MG/1
25 TABLET, EXTENDED RELEASE ORAL DAILY
Qty: 90 TABLET | Refills: 3 | Status: SHIPPED | OUTPATIENT
Start: 2020-11-13 | End: 2020-12-23

## 2020-11-13 RX ORDER — DOXYCYCLINE 100 MG/1
100 CAPSULE ORAL EVERY 12 HOURS
Qty: 42 CAPSULE | Refills: 0 | Status: SHIPPED | OUTPATIENT
Start: 2020-11-13 | End: 2020-12-04

## 2020-11-13 NOTE — PROGRESS NOTES
Subjective:    Patient ID:  Miguel Gaffney is a 40 y.o. male who presents for evaluation of No chief complaint on file.    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 He notes at least weekly episodes of palpitations. Initially, he thought his palpitations might be secondary to caffeine intake as he was taking in about 4-5 cups of coffee daily. He reduced caffeine intake, but his palpitations continue. He notices them mostly at night when at rest. He has a sensation of a racing, regular heart beat. He has checked his pulse and notes HRs around 120s. He has no syncope or presyncope. He has no chest pain or shortness of breath. He does occasionally have a fullness in his chest. Occasionally, he may experience a fullness in his throat as well. He notes that episodes last seconds to 1 minute in duration. He sometimes has trouble falling asleep because of them. Palpitations do not wake him from sleep however. He has no associated nausea. He has no associated diaphoresis. He has no family history of sudden cardiac death.     No FH SCA  echo 63%  event monitor: SR, including during palpitations. Asx 5 b NSVT at 3 AM.    My interpretation of today's ECG is NSRS    Review of Systems   Constitution: Negative for chills, decreased appetite, diaphoresis, malaise/fatigue and weight loss.   HENT: Negative for congestion, hearing loss, nosebleeds and sore throat.    Eyes: Negative for pain, redness and visual disturbance.   Cardiovascular: Positive for irregular heartbeat and palpitations. Negative for chest pain, dyspnea on exertion, leg swelling, orthopnea and syncope.   Respiratory: Negative for cough, shortness of breath, sleep disturbances due to breathing and wheezing.    Endocrine: Negative for cold intolerance and heat intolerance.   Hematologic/Lymphatic: Negative for bleeding problem. Does not  bruise/bleed easily.   Skin: Negative for color change, dry skin, poor wound healing and rash.   Musculoskeletal: Negative for arthritis, back pain, falls, joint pain, muscle weakness and myalgias.   Gastrointestinal: Negative for abdominal pain, change in bowel habit, constipation, diarrhea, hematochezia, melena and vomiting.   Genitourinary: Negative for dysuria, frequency, hematuria, nocturia and urgency.   Neurological: Negative for difficulty with concentration, disturbances in coordination, dizziness, focal weakness, headaches, light-headedness, numbness and weakness.   Psychiatric/Behavioral: Negative for altered mental status. The patient does not have insomnia.         Objective:    Physical Exam   Constitutional: He is oriented to person, place, and time. He appears well-developed and well-nourished. No distress.   HENT:   Head: Normocephalic and atraumatic.   Eyes: Conjunctivae and EOM are normal. Right eye exhibits no discharge. Left eye exhibits no discharge.   Neck: Normal range of motion.   Cardiovascular: Normal rate and regular rhythm.   Pulmonary/Chest: Effort normal and breath sounds normal. No respiratory distress.   Abdominal: He exhibits no distension.   Musculoskeletal: Normal range of motion.         General: No edema.   Neurological: He is alert and oriented to person, place, and time.   Skin: Skin is warm and dry.   Vitals reviewed.    ECG today: sinus rhythm      Assessment:       1. 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      No arrhythmia seen on monitor during palps.  5b NSVT likely a red herring, given structurally normal heart with normal ECG. However, pt's father had CAD and he's a bit concerned about that.  PET stress to definitively assess.  Low-dose prn toprol.    f/u 6 mos or earlier prn.

## 2020-11-16 ENCOUNTER — PATIENT MESSAGE (OUTPATIENT)
Dept: OTOLARYNGOLOGY | Facility: CLINIC | Age: 40
End: 2020-11-16

## 2020-11-16 DIAGNOSIS — J32.4 CHRONIC PANSINUSITIS: Primary | ICD-10-CM

## 2020-11-16 LAB — BACTERIA SPEC ANAEROBE CULT: NORMAL

## 2020-11-16 RX ORDER — LEVOFLOXACIN 500 MG/1
500 TABLET, FILM COATED ORAL DAILY
Qty: 21 TABLET | Refills: 0 | Status: SHIPPED | OUTPATIENT
Start: 2020-11-16 | End: 2020-12-07

## 2020-11-23 ENCOUNTER — PATIENT MESSAGE (OUTPATIENT)
Dept: OTOLARYNGOLOGY | Facility: CLINIC | Age: 40
End: 2020-11-23

## 2020-12-01 ENCOUNTER — HOSPITAL ENCOUNTER (OUTPATIENT)
Dept: CARDIOLOGY | Facility: HOSPITAL | Age: 40
Discharge: HOME | End: 2020-12-01
Attending: INTERNAL MEDICINE
Payer: COMMERCIAL

## 2020-12-01 VITALS — DIASTOLIC BLOOD PRESSURE: 79 MMHG | HEART RATE: 80 BPM | SYSTOLIC BLOOD PRESSURE: 93 MMHG

## 2020-12-01 DIAGNOSIS — I47.29 NSVT (NONSUSTAINED VENTRICULAR TACHYCARDIA): ICD-10-CM

## 2020-12-01 PROCEDURE — 63600175 PHARM REV CODE 636 W HCPCS: Performed by: INTERNAL MEDICINE

## 2020-12-01 PROCEDURE — 78434 AQMBF PET REST & RX STRESS: CPT

## 2020-12-01 RX ORDER — DIPYRIDAMOLE 5 MG/ML
46.58 INJECTION INTRAVENOUS ONCE
Status: COMPLETED | OUTPATIENT
Start: 2020-12-01 | End: 2020-12-01

## 2020-12-01 RX ADMIN — DIPYRIDAMOLE 46.6 MG: 5 INJECTION INTRAVENOUS at 08:12

## 2020-12-02 ENCOUNTER — PATIENT MESSAGE (OUTPATIENT)
Dept: ELECTROPHYSIOLOGY | Facility: CLINIC | Age: 40
End: 2020-12-02

## 2020-12-03 ENCOUNTER — TELEPHONE (OUTPATIENT)
Dept: CARDIOLOGY | Facility: CLINIC | Age: 40
End: 2020-12-03

## 2020-12-03 NOTE — TELEPHONE ENCOUNTER
Got in touch with Dr. Gaffney today, explained scan with small inferior wall defect stress.  With technical issues involving bolus of radiotracer, unsure about the clinical significance of this defect.  No large perfusion abnormalities, no severe perfusion abnormalities, and no rest perfusion abnormalities.    Offered him repeating PET stress test if he would like, versus alternative modality of risk stratification.  He is going to talk with Dr. Ruiz as well, leaning towards alternative modality i.e. calcium scan.  Could be arranged by a general cardiologist as well, given Dr. Ruiz was the first point of contact will defer to him.      Iam Thomas MD

## 2020-12-03 NOTE — TELEPHONE ENCOUNTER
----- Message from Sohail Ruiz MD sent at 12/2/2020  1:05 PM CST -----  Regarding: RE: PET stress test  Ah, how unfortunate.  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 We were doing the stress mostly to ease his mind.  Please do contact him as you suggested. He'll either be satisfied with your explanation or will want to repeat the PET to be 100% sure. I hope it's the former.  thanks  Harsha      ----- Message -----  From: Iam Thomas MD  Sent: 12/1/2020   5:03 PM CST  To: Sohail Ruiz MD  Subject: PET stress test                                  With Dr. Gaffney's PET today there was some issue with the IV they did not figure out until I was reading the test. Reviewed with Vanessa and he agrees.  I suspect there was some kink, or infiltration, and the rubidium bolus did not go smoothly through the heart.  As a result we cannot trust his flows.      However just looking at his relative images, everything looks good, no significant infarct, no large ischemia except at stress there is a very small 3-5% defect in the basal inferior wall, I cannot tell you if this is real or not given there was some issue with the infusion.    If we want to rule out 5% basal stress defect then we would need to repeat the test, of course free of charge to him.  I was going to call him and let him know if that's ok with you. However given it was an soft indication he might need just a calcium score or something of that nature.    Iam

## 2020-12-04 ENCOUNTER — PATIENT MESSAGE (OUTPATIENT)
Dept: ELECTROPHYSIOLOGY | Facility: CLINIC | Age: 40
End: 2020-12-04

## 2020-12-04 ENCOUNTER — TELEPHONE (OUTPATIENT)
Dept: ELECTROPHYSIOLOGY | Facility: CLINIC | Age: 40
End: 2020-12-04

## 2020-12-04 DIAGNOSIS — I25.10 ATHEROSCLEROSIS OF NATIVE CORONARY ARTERY OF NATIVE HEART WITHOUT ANGINA PECTORIS: ICD-10-CM

## 2020-12-04 NOTE — TELEPHONE ENCOUNTER
Reviewed PET scan with Dr. Gaffney. Technical problems reviewed.  Will proceed with coronary Ca score.

## 2020-12-09 ENCOUNTER — HOSPITAL ENCOUNTER (OUTPATIENT)
Dept: RADIOLOGY | Facility: HOSPITAL | Age: 40
Discharge: HOME | End: 2020-12-09
Attending: INTERNAL MEDICINE
Payer: COMMERCIAL

## 2020-12-09 DIAGNOSIS — I25.10 ATHEROSCLEROSIS OF NATIVE CORONARY ARTERY OF NATIVE HEART WITHOUT ANGINA PECTORIS: ICD-10-CM

## 2020-12-09 PROBLEM — H05.012 ORBITAL CELLULITIS ON LEFT: Status: RESOLVED | Noted: 2019-12-23 | Resolved: 2020-12-09

## 2020-12-09 PROBLEM — J21.9 ACUTE BRONCHIOLITIS WITH BRONCHOSPASM: Status: RESOLVED | Noted: 2019-03-29 | Resolved: 2020-12-09

## 2020-12-09 PROBLEM — R91.8 MULTIPLE PULMONARY NODULES: Status: ACTIVE | Noted: 2020-12-09

## 2020-12-09 PROCEDURE — 75571 CT HRT W/O DYE W/CA TEST: CPT | Mod: TC

## 2020-12-09 PROCEDURE — 75571 CT CALCIUM SCORING CARDIAC: ICD-10-PCS | Mod: 26,,, | Performed by: RADIOLOGY

## 2020-12-09 PROCEDURE — 75571 CT HRT W/O DYE W/CA TEST: CPT | Mod: 26,,, | Performed by: RADIOLOGY

## 2020-12-11 ENCOUNTER — TELEPHONE (OUTPATIENT)
Dept: PULMONOLOGY | Facility: CLINIC | Age: 40
End: 2020-12-11

## 2020-12-11 NOTE — TELEPHONE ENCOUNTER
I offered Mr Gaffney an  appointment with BETH Hernandez on 12-28-20 but he declined asking for a MD. I told him there is no availability at this time but I will call him if someone cancels. He was fine with this. Mercedes Adame LPN

## 2020-12-11 NOTE — TELEPHONE ENCOUNTER
----- Message from Shantel Mayorga sent at 12/11/2020  8:14 AM CST -----  Contact: pt  Pt called to schedule a NP appt for nodule in lung and asked for a call back

## 2020-12-14 ENCOUNTER — PATIENT OUTREACH (OUTPATIENT)
Dept: ADMINISTRATIVE | Facility: OTHER | Age: 40
End: 2020-12-14

## 2020-12-14 PROBLEM — E78.5 DYSLIPIDEMIA: Status: ACTIVE | Noted: 2020-12-14

## 2020-12-15 NOTE — PROGRESS NOTES
Subjective:   Patient ID:  Miguel Gaffney is a 40 y.o. 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 The patient has no chest pain, SOB, TIA,  syncope or pre-syncope, but he's had palpitations.Patient does not exercise but gets 6-8000 steps.Not taking metoprolol. Rest HR .PET Stress borderline abnormal CAC 0.        Review of Systems   Constitution: Negative for chills, decreased appetite, diaphoresis, fever, malaise/fatigue, night sweats, weight gain and weight loss.   HENT: Negative for congestion, hoarse voice, nosebleeds, sore throat and tinnitus.    Eyes: Negative for blurred vision, double vision, vision loss in left eye, vision loss in right eye, visual disturbance and visual halos.   Cardiovascular: Positive for palpitations. Negative for chest pain, claudication, cyanosis, dyspnea on exertion, irregular heartbeat, leg swelling, near-syncope, orthopnea, paroxysmal nocturnal dyspnea and syncope.   Respiratory: Negative for cough, hemoptysis, shortness of breath, sleep disturbances due to breathing, snoring, sputum production and wheezing.    Endocrine: Negative for cold intolerance, heat intolerance, polydipsia, polyphagia and polyuria.   Hematologic/Lymphatic: Negative for adenopathy and bleeding problem. Does not bruise/bleed easily.   Skin: Negative for color change, dry skin, flushing, itching, nail changes, poor wound healing, rash, skin cancer, suspicious lesions and unusual hair distribution.   Musculoskeletal: Negative for arthritis, back pain, falls, gout, joint pain, joint swelling, muscle cramps, muscle weakness, myalgias and stiffness.   Gastrointestinal: Negative for abdominal pain, anorexia, change in bowel habit, constipation, diarrhea, dysphagia, heartburn, hematemesis, hematochezia, melena and vomiting.   Genitourinary: Negative for decreased libido, dysuria, hematuria, hesitancy and urgency.   Neurological:  Negative for excessive daytime sleepiness, dizziness, focal weakness, headaches, light-headedness, loss of balance, numbness, paresthesias, seizures, sensory change, tremors, vertigo and weakness.   Psychiatric/Behavioral: Negative for altered mental status, depression, hallucinations, memory loss, substance abuse and suicidal ideas. The patient does not have insomnia and is not nervous/anxious.    Allergic/Immunologic: Negative for environmental allergies and hives.       Objective: /76 (BP Location: Left arm, Patient Position: Sitting, BP Method: Large (Manual))   Pulse 102   Ht 6' (1.829 m)   Wt 83.8 kg (184 lb 11.9 oz)   SpO2 98%   BMI 25.06 kg/m²      Physical Exam   Constitutional: He is oriented to person, place, and time. He appears well-developed and well-nourished. No distress.   HENT:   Head: Normocephalic.   Eyes: Pupils are equal, round, and reactive to light. EOM are normal.   Neck: Normal range of motion. No thyromegaly present.   Cardiovascular: Normal rate, regular rhythm, normal heart sounds and intact distal pulses. Exam reveals no gallop and no friction rub.   No murmur heard.  Pulses:       Carotid pulses are 3+ on the right side and 3+ on the left side.       Radial pulses are 3+ on the right side and 3+ on the left side.        Femoral pulses are 3+ on the right side and 3+ on the left side.       Popliteal pulses are 3+ on the right side and 3+ on the left side.        Dorsalis pedis pulses are 3+ on the right side and 3+ on the left side.        Posterior tibial pulses are 3+ on the right side and 3+ on the left side.   Pulmonary/Chest: Effort normal and breath sounds normal. No respiratory distress. He has no wheezes. He has no rales. He exhibits no tenderness.   Abdominal: Soft. He exhibits no distension and no mass. There is no abdominal tenderness.   Musculoskeletal: Normal range of motion.   Lymphadenopathy:     He has no cervical adenopathy.   Neurological: He is alert and  oriented to person, place, and time.   Skin: Skin is warm. He is not diaphoretic. No cyanosis. Nails show no clubbing.   Psychiatric: He has a normal mood and affect. His speech is normal and behavior is normal. Judgment and thought content normal. Cognition and memory are normal.       Assessment:     1. Abnormal stress test    2. Dyslipidemia    3. Palpitations    4. Vitamin D insufficiency    5. Atherosclerosis of native coronary artery of native heart without angina pectoris    6. Overweight (BMI 25.0-29.9)    7. NSVT (nonsustained ventricular tachycardia)        Plan:   Discussed diet , achieving and maintaining ideal body weight, and exercise.   We reviewed meds in detail.  Reassured-Discussed goals, options, plan  Should be on Omega-3 > 800/d average of combined EPA/DHA  Could do Lpa and HSCRP  Could get Ex Echo  PRN short acting metoprolol    Miguel was seen today for palpitations.    Diagnoses and all orders for this visit:    Abnormal stress test    Dyslipidemia  -     Lipid Panel; Future; Expected date: 12/17/2020  -     Comprehensive Metabolic Panel; Future; Expected date: 12/17/2020  -     Lipoprotein A (LPA); Future; Expected date: 12/17/2020  -     CBC Auto Differential; Future; Expected date: 12/17/2020  -     CRP, High Sensitivity; Future; Expected date: 12/17/2020    Palpitations  -     metoprolol tartrate (LOPRESSOR) 50 MG tablet; Take 1 tablet (50 mg total) by mouth 2 (two) times daily.    Vitamin D insufficiency    Atherosclerosis of native coronary artery of native heart without angina pectoris  -     Ambulatory referral/consult to Cardiology    Overweight (BMI 25.0-29.9)    NSVT (nonsustained ventricular tachycardia)  -     Lipid Panel; Future; Expected date: 12/17/2020  -     Comprehensive Metabolic Panel; Future; Expected date: 12/17/2020  -     metoprolol tartrate (LOPRESSOR) 50 MG tablet; Take 1 tablet (50 mg total) by mouth 2 (two) times daily.            Follow up if symptoms worsen or  fail to improve, for Labs ordered.

## 2020-12-16 ENCOUNTER — OFFICE VISIT (OUTPATIENT)
Dept: CARDIOLOGY | Facility: CLINIC | Age: 40
End: 2020-12-16
Payer: COMMERCIAL

## 2020-12-16 VITALS
HEART RATE: 102 BPM | BODY MASS INDEX: 25.02 KG/M2 | OXYGEN SATURATION: 98 % | SYSTOLIC BLOOD PRESSURE: 120 MMHG | DIASTOLIC BLOOD PRESSURE: 76 MMHG | WEIGHT: 184.75 LBS | HEIGHT: 72 IN

## 2020-12-16 DIAGNOSIS — E55.9 VITAMIN D INSUFFICIENCY: ICD-10-CM

## 2020-12-16 DIAGNOSIS — E78.5 DYSLIPIDEMIA: ICD-10-CM

## 2020-12-16 DIAGNOSIS — I25.10 ATHEROSCLEROSIS OF NATIVE CORONARY ARTERY OF NATIVE HEART WITHOUT ANGINA PECTORIS: ICD-10-CM

## 2020-12-16 DIAGNOSIS — E66.3 OVERWEIGHT (BMI 25.0-29.9): ICD-10-CM

## 2020-12-16 DIAGNOSIS — R94.39 ABNORMAL STRESS TEST: Primary | ICD-10-CM

## 2020-12-16 DIAGNOSIS — R00.2 PALPITATIONS: Chronic | ICD-10-CM

## 2020-12-16 DIAGNOSIS — I47.29 NSVT (NONSUSTAINED VENTRICULAR TACHYCARDIA): ICD-10-CM

## 2020-12-16 PROCEDURE — 99244 OFF/OP CNSLTJ NEW/EST MOD 40: CPT | Mod: S$GLB,,, | Performed by: INTERNAL MEDICINE

## 2020-12-16 PROCEDURE — 3008F BODY MASS INDEX DOCD: CPT | Mod: CPTII,S$GLB,, | Performed by: INTERNAL MEDICINE

## 2020-12-16 PROCEDURE — 3008F PR BODY MASS INDEX (BMI) DOCUMENTED: ICD-10-PCS | Mod: CPTII,S$GLB,, | Performed by: INTERNAL MEDICINE

## 2020-12-16 PROCEDURE — 99244 PR OFFICE CONSULTATION,LEVEL IV: ICD-10-PCS | Mod: S$GLB,,, | Performed by: INTERNAL MEDICINE

## 2020-12-16 PROCEDURE — 99999 PR PBB SHADOW E&M-EST. PATIENT-LVL IV: ICD-10-PCS | Mod: PBBFAC,,, | Performed by: INTERNAL MEDICINE

## 2020-12-16 PROCEDURE — 99999 PR PBB SHADOW E&M-EST. PATIENT-LVL IV: CPT | Mod: PBBFAC,,, | Performed by: INTERNAL MEDICINE

## 2020-12-16 RX ORDER — METOPROLOL TARTRATE 50 MG/1
50 TABLET ORAL 2 TIMES DAILY
Qty: 30 TABLET | Refills: 12 | Status: SHIPPED | OUTPATIENT
Start: 2020-12-16 | End: 2020-12-23

## 2020-12-16 NOTE — LETTER
December 16, 2020      Sohail Ruiz MD  [address]  [facility] [address]           [facility] 3rd Floor  [address]  [facility] [address]  Phone: [phone]          Patient: Miguel Gaffney   MR Number: [id number]   YOB: 1980   Date of Visit: 12/16/2020       Dear Dr. Sohail Ruiz:    Thank you for referring Miguel Gaffney to me for evaluation. Attached you will find relevant portions of my assessment and plan of care.    If you have questions, please do not hesitate to call me. I look forward to following Miguel Gaffney along with you.    Sincerely,    Valerio Fung MD    Enclosure  CC:  No Recipients    If you would like to receive this communication electronically, please contact [email or web address] or [phone] to request more information on [employer] access.    For providers and/or their staff who would like to refer a patient to [facility], please contact us through our one-stop-shop provider referral line, [facility], at [phone].    If you feel you have received this communication in error or would no longer like to receive these types of communications, please e-mail [email or web address]

## 2020-12-16 NOTE — PATIENT INSTRUCTIONS
Discussed diet , achieving and maintaining ideal body weight, and exercise.   We reviewed meds in detail.  Reassured-Discussed goals, options, plan  Should be on Omega-3 > 800/d average of combined EPA/DHA  Could do Lpa and HSCRP  Could get Ex Echo  PRN short acting metoprolol

## 2020-12-20 DIAGNOSIS — M25.541 ARTHRALGIA OF BOTH HANDS: Primary | ICD-10-CM

## 2020-12-20 DIAGNOSIS — R91.8 MULTIPLE LUNG NODULES: ICD-10-CM

## 2020-12-20 DIAGNOSIS — M25.542 ARTHRALGIA OF BOTH HANDS: Primary | ICD-10-CM

## 2020-12-20 DIAGNOSIS — R91.1 SOLITARY PULMONARY NODULE: ICD-10-CM

## 2020-12-22 PROBLEM — I25.10 ATHEROSCLEROSIS OF NATIVE CORONARY ARTERY OF NATIVE HEART WITHOUT ANGINA PECTORIS: Status: ACTIVE | Noted: 2020-12-22

## 2020-12-22 PROBLEM — E78.6 LOW HDL (UNDER 40): Status: ACTIVE | Noted: 2020-12-22

## 2020-12-22 NOTE — PROGRESS NOTES
"INTERNAL MEDICINE CLINIC  Follow-up Visit Progress Note     PRESENTING HISTORY     PCP: Emanuel Evans MD    Last Clinic Visit with me: 3-    Current Chief Complaint/Problem:  Annual Exam.    History of Present Illness & ROS: Mr. Miguel Gaffney is a 40 y.o. male.    Had sinus surgery in Jan 2020.  Uses saline rinse twice daily.     Has stiffness in fingers and feet - early morning.    No chest pain or SOB.  Has occasional high hear rate.       PAST HISTORY:     Past Medical History:   Diagnosis Date    Abnormal stress test 12/16/2020    Borderline abnormal stress     Acute bronchiolitis with bronchospasm 3/29/2019    Allergic rhinitis due to animal hair and dander     Atherosclerosis of native coronary artery of native heart without angina pectoris 12/22/2020  Dr. Fung Discussed diet , achieving and maintaining ideal body weight, and exercise.  We reviewed meds in detail. Reassured-Discussed goals, options, plan Should be on Omega-3 > 800/d average of combined EPA/DHA Could do Lpa and HSCRP Could get Ex Echo PRN short acting metoprolol    Dyslipidemia 12/14/2020    Elevated rheumatoid factor 12/23/2020    Low HDL (under 40) 12/22/2020    Multiple pulmonary nodules 12/9/2020    CT  Multiple pulmonary nodules with the largest in the left lower lobe posterior basal segment measuring 0.9 cm.  For multiple solid nodules with any 6 mm or greater, Fleischner Society guidelines recommend follow up with non-contrast chest CT at 3-6 months and 18-24 months after discovery."     NSVT (nonsustained ventricular tachycardia) 12/16/2020    4 beat     Orbital cellulitis on left 12/23/2019    Recurrent sinus infections     Seasonal allergic rhinitis due to pollen     Vitamin D insufficiency 3/30/2019    Recommend Vitamin D 2000 IU daily.       Past Surgical History:   Procedure Laterality Date    FUNCTIONAL ENDOSCOPIC SINUS SURGERY (FESS) USING COMPUTER-ASSISTED NAVIGATION Bilateral " 12/16/2019    Procedure: FESS, USING COMPUTER-ASSISTED NAVIGATION;  Surgeon: Charlie Altman MD;  Location: [facility] OR [facility] FLR;  Service: ENT;  Laterality: Bilateral;  TIVA    NASAL SEPTOPLASTY N/A 12/16/2019    Procedure: SEPTOPLASTY, NOSE;  Surgeon: Charlie Altman MD;  Location: [facility] OR 2ND FLR;  Service: ENT;  Laterality: N/A;  TIVA       Family History   Problem Relation Age of Onset    Hypertension Mother     Diabetes Father     Heart disease Father 55       Social History     Socioeconomic History    Marital status:      Spouse name: Liam    Number of children: 2    Years of education: Not on file    Highest education level: Not on file   Occupational History    Not on file   Social Needs    Financial resource strain: Not on file    Food insecurity     Worry: Not on file     Inability: Not on file    Transportation needs     Medical: Not on file     Non-medical: Not on file   Tobacco Use    Smoking status: Never Smoker    Smokeless tobacco: Never Used   Substance and Sexual Activity    Alcohol use: Yes     Comment: occasional    Drug use: Not Currently    Sexual activity: Yes     Partners: Female   Lifestyle    Physical activity     Days per week: Not on file     Minutes per session: Not on file    Stress: Not on file   Relationships    Social connections     Talks on phone: Not on file     Gets together: Not on file     Attends Faith service: Not on file     Active member of club or organization: Not on file     Attends meetings of clubs or organizations: Not on file     Relationship status: Not on file   Other Topics Concern    Not on file   Social History Narrative    Was in [place]. 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    : Liam Horta boys 11 and 7 as of 2019.       MEDICATIONS & ALLERGIES:     Current Outpatient Medications on File Prior to Visit   Medication Sig Dispense Refill    BREO ELLIPTA 200-25 mcg/dose DsDv diskus inhaler  INHALE 1 PUFF INTO THE LUNGS ONCE DAILY 1 each 5    XHANCE 93 mcg/actuation AerB USE 1 SPRAY IN EACH NOSTRIL TWICE DAILY AS DIRECTED 16 mL 5           metoprolol tartrate (LOPRESSOR) 50 MG tablet Take 1 tablet (50 mg total) by mouth 2 (two) times daily. 30 tablet 12     No current facility-administered medications on file prior to visit.       Review of patient's allergies indicates:  No Known Allergies    Medications Reconciliation:   I have reconciled the patient's home medications and discharge medications with the patient/family. I have updated all changes.  Refer to After-Visit Medication List.    OBJECTIVE:     Vital Signs:  Vitals:    12/23/20 1623   BP: 104/80   Pulse: 108     Wt Readings from Last 1 Encounters:   12/23/20 1623 82.3 kg (181 lb 7 oz)     Body mass index is 24.61 kg/m².     Physical Exam:  General: Well developed, well nourished. No distress.  HEENT: Head is normocephalic, atraumatic   Eyes: Clear conjunctiva.  Neck: Supple, symmetrical neck; trachea midline.  Lungs: Clear to auscultation bilaterally and normal respiratory effort.  Cardiovascular: Heart with regular rate and rhythm.    Extremities: No LE edema.    Abdomen: Abdomen is soft, non-tender non-distended with normal bowel sounds.  Musculoskeletal: Normal gait.   Genital:  Normal penis. Foreskin retractable.   No rash in the genital area.  Scrotum and epididymis normal. No inguinal hernia.  No inguinal nodes.   Rectal: No perianal lesions or rash. Digital exam: normal prostate and rectum.  Psychiatric: Normal affect. Alert.    Laboratory  Lab Results   Component Value Date    WBC 7.65 12/23/2020    HGB 14.2 12/23/2020    HCT 45.3 12/23/2020     12/23/2020    CHOL 167 12/23/2020    TRIG 126 12/23/2020    HDL 35 (L) 12/23/2020    ALT 20 12/23/2020    AST 19 12/23/2020     12/23/2020    K 4.3 12/23/2020     12/23/2020    CREATININE 0.8 12/23/2020    BUN 12 12/23/2020    CO2 28 12/23/2020    TSH 1.556 09/30/2020     PSA 0.81 03/29/2019    HGBA1C 5.4 03/29/2019         Ref. Range 3/29/2019 16:35   Vit D, 25-Hydroxy Latest Ref Range: 30 - 96 ng/mL 16 (L)     ASSESSMENT & PLAN:     Annual physical exam  - Labs with next blood draw.  -     Hepatitis C Antibody; Future; Expected date: 06/23/2021  -     HIV 1/2 Ag/Ab (4th Gen); Future; Expected date: 06/23/2021  -     PSA, Screening; Future; Expected date: 06/23/2021    Low HDL (under 40)  Dyslipidemia  -     pravastatin (PRAVACHOL) 40 MG tablet; Take 1 tablet (40 mg total) by mouth once daily.  Dispense: 90 tablet; Refill: 3  -     aspirin 81 MG Chew; Take 1 tablet (81 mg total) by mouth once daily.    Abnormal stress test  Atherosclerosis of native coronary artery of native heart without angina pectoris  -     pravastatin (PRAVACHOL) 40 MG tablet; Take 1 tablet (40 mg total) by mouth once daily.  Dispense: 90 tablet; Refill: 3  -     aspirin 81 MG Chew; Take 1 tablet (81 mg total) by mouth once daily.    Recurrent sinus infections  Allergic rhinitis due to animal hair and dander  - Had sinus surgery 1-2020. Using saline rash twice daily.  -     CT Sinuses without Contrast; Future; Expected date: 06/23/2021    -     ascorbic acid, vitamin C, (VITAMIN C) 1000 MG tablet; Take 1 tablet (1,000 mg total) by mouth once daily.  -     zinc 50 mg Tab; Take 1 tablet by mouth once daily.; Refill: 0    NSVT (nonsustained ventricular tachycardia)  - Followed by Cardiology.  -     metoprolol tartrate (LOPRESSOR) 25 MG tablet; Take 1 tablet (25 mg total) by mouth every 6 (six) hours as needed.  Dispense: 60 tablet; Refill: 3    Vitamin D insufficiency  -     cholecalciferol, vitamin D3, 125 mcg (5,000 unit) Tab; Take 1 tablet (5,000 Units total) by mouth once daily.    Multiple pulmonary nodules  CT  Multiple pulmonary nodules with the largest in the left lower lobe posterior basal segment measuring 0.9 cm.  For multiple solid nodules with any 6 mm or greater, Fleischner Society guidelines  "recommend follow up with non-contrast chest CT at 3-6 months and 18-24 months after discovery."     - Had BCG. Negative Quantiferon.    Plan:  -     CT Chest Without Contrast; Future; Expected date: 06/23/2021    Elevated rheumatoid factor  - Follow up Rheumatology.    Preventive Health Maintenance:  Pneumovax.    Return to Clinic for Follow Up with me:   6 months.    Scheduled Follow-up :  Future Appointments   Date Time Provider Department Center   1/13/2021  8:00 AM Adarsh Cassidy MD [facility]       After Visit Medication List :     Medication List          Accurate as of December 23, 2020  6:23 PM. If you have any questions, ask your nurse or doctor.            START taking these medications    ascorbic acid (vitamin C) 1000 MG tablet  Commonly known as: VITAMIN C  Take 1 tablet (1,000 mg total) by mouth once daily.  Started by: Emanuel Evans MD     aspirin 81 MG Chew  Take 1 tablet (81 mg total) by mouth once daily.  Started by: Emanuel Evans MD     cholecalciferol (vitamin D3) 125 mcg (5,000 unit) Tab  Take 1 tablet (5,000 Units total) by mouth once daily.  Started by: Emanuel Evans MD     PNEUMOVAX-23 25 mcg/0.5 mL vaccine  Generic drug: pneumococcal 23-ysabel ps  Inject 0.5 mLs into the muscle once. For one dose. for 1 dose     pravastatin 40 MG tablet  Commonly known as: PRAVACHOL  Take 1 tablet (40 mg total) by mouth once daily.  Started by: Emanuel Evans MD     zinc 50 mg Tab  Take 1 tablet by mouth once daily.  Started by: Emanuel Evans MD        CHANGE how you take these medications    metoprolol tartrate 25 MG tablet  Commonly known as: LOPRESSOR  Take 1 tablet (25 mg total) by mouth every 6 (six) hours as needed.  What changed:   · medication strength  · how much to take  · when to take this  · reasons to take this  Changed by: Emanuel Evans MD        CONTINUE taking these medications    BREO ELLIPTA 200-25 mcg/dose Dsdv diskus inhaler  Generic drug: fluticasone furoate-vilanteroL  INHALE " 1 PUFF INTO THE LUNGS ONCE DAILY     XHANCE 93 mcg/actuation Aerb  Generic drug: fluticasone propionate  USE 1 SPRAY IN EACH NOSTRIL TWICE DAILY AS DIRECTED        STOP taking these medications    metoprolol succinate 25 MG 24 hr tablet  Commonly known as: TOPROL-XL  Stopped by: Emanuel Evans MD           Where to Get Your Medications      These medications were sent to [facility]  [address] [place] [id number]    Hours: Mon-Fri, 8a-5:30p Phone: [phone]   · PNEUMOVAX-23 25 mcg/0.5 mL vaccine     These medications were sent to [id number] - [place] - [address] AT [facility] [address]  [address] [address]    Hours: 24-hours Phone: [phone]   · pravastatin 40 MG tablet     You can get these medications from any pharmacy    You don't need a prescription for these medications  · ascorbic acid (vitamin C) 1000 MG tablet  · aspirin 81 MG Chew  · cholecalciferol (vitamin D3) 125 mcg (5,000 unit) Tab  · zinc 50 mg Tab     Information about where to get these medications is not yet available    Ask your nurse or doctor about these medications  · metoprolol tartrate 25 MG tablet         Signing Physician:  Emanuel Evans MD

## 2020-12-23 ENCOUNTER — LAB VISIT (OUTPATIENT)
Dept: LAB | Facility: HOSPITAL | Age: 40
End: 2020-12-23
Attending: INTERNAL MEDICINE
Payer: COMMERCIAL

## 2020-12-23 ENCOUNTER — OFFICE VISIT (OUTPATIENT)
Dept: INTERNAL MEDICINE | Facility: CLINIC | Age: 40
End: 2020-12-23
Payer: COMMERCIAL

## 2020-12-23 ENCOUNTER — PATIENT MESSAGE (OUTPATIENT)
Dept: CARDIOLOGY | Facility: CLINIC | Age: 40
End: 2020-12-23

## 2020-12-23 VITALS
BODY MASS INDEX: 24.58 KG/M2 | SYSTOLIC BLOOD PRESSURE: 104 MMHG | WEIGHT: 181.44 LBS | HEART RATE: 108 BPM | HEIGHT: 72 IN | OXYGEN SATURATION: 96 % | DIASTOLIC BLOOD PRESSURE: 80 MMHG

## 2020-12-23 DIAGNOSIS — E78.6 LOW HDL (UNDER 40): ICD-10-CM

## 2020-12-23 DIAGNOSIS — R91.8 MULTIPLE PULMONARY NODULES: ICD-10-CM

## 2020-12-23 DIAGNOSIS — I25.10 ATHEROSCLEROSIS OF NATIVE CORONARY ARTERY OF NATIVE HEART WITHOUT ANGINA PECTORIS: ICD-10-CM

## 2020-12-23 DIAGNOSIS — M25.542 ARTHRALGIA OF BOTH HANDS: ICD-10-CM

## 2020-12-23 DIAGNOSIS — E78.5 DYSLIPIDEMIA: ICD-10-CM

## 2020-12-23 DIAGNOSIS — J32.9 RECURRENT SINUS INFECTIONS: ICD-10-CM

## 2020-12-23 DIAGNOSIS — R76.8 ELEVATED RHEUMATOID FACTOR: ICD-10-CM

## 2020-12-23 DIAGNOSIS — I47.29 NSVT (NONSUSTAINED VENTRICULAR TACHYCARDIA): ICD-10-CM

## 2020-12-23 DIAGNOSIS — E55.9 VITAMIN D INSUFFICIENCY: ICD-10-CM

## 2020-12-23 DIAGNOSIS — J30.81 ALLERGIC RHINITIS DUE TO ANIMAL HAIR AND DANDER: ICD-10-CM

## 2020-12-23 DIAGNOSIS — R94.39 ABNORMAL STRESS TEST: ICD-10-CM

## 2020-12-23 DIAGNOSIS — M25.541 ARTHRALGIA OF BOTH HANDS: ICD-10-CM

## 2020-12-23 DIAGNOSIS — Z00.00 ANNUAL PHYSICAL EXAM: Primary | ICD-10-CM

## 2020-12-23 LAB
ALBUMIN SERPL BCP-MCNC: 4.1 G/DL (ref 3.5–5.2)
ALP SERPL-CCNC: 83 U/L (ref 55–135)
ALT SERPL W/O P-5'-P-CCNC: 20 U/L (ref 10–44)
ANION GAP SERPL CALC-SCNC: 7 MMOL/L (ref 8–16)
AST SERPL-CCNC: 19 U/L (ref 10–40)
BASOPHILS # BLD AUTO: 0.06 K/UL (ref 0–0.2)
BASOPHILS NFR BLD: 0.8 % (ref 0–1.9)
BILIRUB SERPL-MCNC: 0.3 MG/DL (ref 0.1–1)
BUN SERPL-MCNC: 12 MG/DL (ref 6–20)
CALCIUM SERPL-MCNC: 9.1 MG/DL (ref 8.7–10.5)
CCP AB SER IA-ACNC: <0.5 U/ML
CFR FLOW - ANTERIOR: 2.46
CFR FLOW - INFERIOR: 2.16
CFR FLOW - LATERAL: 2.31
CFR FLOW - MAX: 3.35
CFR FLOW - MIN: 1.4
CFR FLOW - SEPTAL: 2.57
CFR FLOW - WHOLE HEART: 2.38
CHLORIDE SERPL-SCNC: 104 MMOL/L (ref 95–110)
CHOLEST SERPL-MCNC: 167 MG/DL (ref 120–199)
CHOLEST/HDLC SERPL: 4.8 {RATIO} (ref 2–5)
CO2 SERPL-SCNC: 28 MMOL/L (ref 23–29)
CREAT SERPL-MCNC: 0.8 MG/DL (ref 0.5–1.4)
CRP SERPL-MCNC: 4.38 MG/L (ref 0–3.19)
CV PHARM DOSE: 46.6 MG
CV STRESS BASE HR: 67 BPM
DIASTOLIC BLOOD PRESSURE: 84 MMHG
DIFFERENTIAL METHOD: ABNORMAL
END DIASTOLIC INDEX-HIGH: 170 ML/M2
END SYSTOLIC INDEX-HIGH: 70 ML/M2
EOSINOPHIL # BLD AUTO: 0.6 K/UL (ref 0–0.5)
EOSINOPHIL NFR BLD: 7.5 % (ref 0–8)
ERYTHROCYTE [DISTWIDTH] IN BLOOD BY AUTOMATED COUNT: 14.1 % (ref 11.5–14.5)
EST. GFR  (AFRICAN AMERICAN): >60 ML/MIN/1.73 M^2
EST. GFR  (NON AFRICAN AMERICAN): >60 ML/MIN/1.73 M^2
GLUCOSE SERPL-MCNC: 89 MG/DL (ref 70–110)
HCT VFR BLD AUTO: 45.3 % (ref 40–54)
HDLC SERPL-MCNC: 35 MG/DL (ref 40–75)
HDLC SERPL: 21 % (ref 20–50)
HGB BLD-MCNC: 14.2 G/DL (ref 14–18)
IMM GRANULOCYTES # BLD AUTO: 0.02 K/UL (ref 0–0.04)
IMM GRANULOCYTES NFR BLD AUTO: 0.3 % (ref 0–0.5)
LDLC SERPL CALC-MCNC: 106.8 MG/DL (ref 63–159)
LYMPHOCYTES # BLD AUTO: 2.8 K/UL (ref 1–4.8)
LYMPHOCYTES NFR BLD: 36.7 % (ref 18–48)
MCH RBC QN AUTO: 26.1 PG (ref 27–31)
MCHC RBC AUTO-ENTMCNC: 31.3 G/DL (ref 32–36)
MCV RBC AUTO: 83 FL (ref 82–98)
MONOCYTES # BLD AUTO: 0.3 K/UL (ref 0.3–1)
MONOCYTES NFR BLD: 3.9 % (ref 4–15)
NEUTROPHILS # BLD AUTO: 3.9 K/UL (ref 1.8–7.7)
NEUTROPHILS NFR BLD: 50.8 % (ref 38–73)
NONHDLC SERPL-MCNC: 132 MG/DL
NRBC BLD-RTO: 0 /100 WBC
NUC REST DIASTOLIC VOLUME INDEX: 34
NUC REST EJECTION FRACTION: 56
NUC REST SYSTOLIC VOLUME INDEX: 15
NUC STRESS DIASTOLIC VOLUME INDEX: 53
NUC STRESS EJECTION FRACTION: 60 %
NUC STRESS SYSTOLIC VOLUME INDEX: 21
OHS CV CPX 85 PERCENT MAX PREDICTED HEART RATE MALE: 151
OHS CV CPX MAX PREDICTED HEART RATE: 178
OHS CV CPX PATIENT IS FEMALE: 0
OHS CV CPX PATIENT IS MALE: 1
OHS CV CPX PEAK DIASTOLIC BLOOD PRESSURE: 79 MMHG
OHS CV CPX PEAK HEAR RATE: 76 BPM
OHS CV CPX PEAK RATE PRESSURE PRODUCT: 7752
OHS CV CPX PEAK SYSTOLIC BLOOD PRESSURE: 102 MMHG
OHS CV CPX PERCENT MAX PREDICTED HEART RATE ACHIEVED: 43
OHS CV CPX RATE PRESSURE PRODUCT PRESENTING: 7370
PLATELET # BLD AUTO: 326 K/UL (ref 150–350)
PMV BLD AUTO: 9.3 FL (ref 9.2–12.9)
POTASSIUM SERPL-SCNC: 4.3 MMOL/L (ref 3.5–5.1)
PROT SERPL-MCNC: 7.7 G/DL (ref 6–8.4)
RBC # BLD AUTO: 5.45 M/UL (ref 4.6–6.2)
REST FLOW - ANTERIOR: 1.31 CC/MIN/G
REST FLOW - INFERIOR: 1.32 CC/MIN/G
REST FLOW - LATERAL: 1.49 CC/MIN/G
REST FLOW - MAX: 1.83 CC/MIN/G
REST FLOW - MIN: 0.86 CC/MIN/G
REST FLOW - SEPTAL: 1.21 CC/MIN/G
REST FLOW - WHOLE HEART: 1.33 CC/MIN/G
RETIRED EF AND QEF - SEE NOTES: 51 %
RHEUMATOID FACT SERPL-ACNC: 66 IU/ML (ref 0–15)
SODIUM SERPL-SCNC: 139 MMOL/L (ref 136–145)
STRESS FLOW - ANTERIOR: 3.21 CC/MIN/G
STRESS FLOW - INFERIOR: 2.83 CC/MIN/G
STRESS FLOW - LATERAL: 3.44 CC/MIN/G
STRESS FLOW - MAX: 4.44 CC/MIN/G
STRESS FLOW - MIN: 1.97 CC/MIN/G
STRESS FLOW - SEPTAL: 3.07 CC/MIN/G
STRESS FLOW - WHOLE HEART: 3.14 CC/MIN/G
SYSTOLIC BLOOD PRESSURE: 110 MMHG
TRIGL SERPL-MCNC: 126 MG/DL (ref 30–150)
WBC # BLD AUTO: 7.65 K/UL (ref 3.9–12.7)

## 2020-12-23 PROCEDURE — 99214 OFFICE O/P EST MOD 30 MIN: CPT | Mod: S$GLB,,, | Performed by: INTERNAL MEDICINE

## 2020-12-23 PROCEDURE — 99999 PR PBB SHADOW E&M-EST. PATIENT-LVL III: ICD-10-PCS | Mod: PBBFAC,,, | Performed by: INTERNAL MEDICINE

## 2020-12-23 PROCEDURE — 86141 C-REACTIVE PROTEIN HS: CPT

## 2020-12-23 PROCEDURE — 80061 LIPID PANEL: CPT

## 2020-12-23 PROCEDURE — 3008F PR BODY MASS INDEX (BMI) DOCUMENTED: ICD-10-PCS | Mod: CPTII,S$GLB,, | Performed by: INTERNAL MEDICINE

## 2020-12-23 PROCEDURE — 36415 COLL VENOUS BLD VENIPUNCTURE: CPT | Mod: PO

## 2020-12-23 PROCEDURE — 83695 ASSAY OF LIPOPROTEIN(A): CPT

## 2020-12-23 PROCEDURE — 85025 COMPLETE CBC W/AUTO DIFF WBC: CPT

## 2020-12-23 PROCEDURE — 99214 PR OFFICE/OUTPT VISIT, EST, LEVL IV, 30-39 MIN: ICD-10-PCS | Mod: S$GLB,,, | Performed by: INTERNAL MEDICINE

## 2020-12-23 PROCEDURE — 86200 CCP ANTIBODY: CPT

## 2020-12-23 PROCEDURE — 3008F BODY MASS INDEX DOCD: CPT | Mod: CPTII,S$GLB,, | Performed by: INTERNAL MEDICINE

## 2020-12-23 PROCEDURE — 99999 PR PBB SHADOW E&M-EST. PATIENT-LVL III: CPT | Mod: PBBFAC,,, | Performed by: INTERNAL MEDICINE

## 2020-12-23 PROCEDURE — 1126F AMNT PAIN NOTED NONE PRSNT: CPT | Mod: S$GLB,,, | Performed by: INTERNAL MEDICINE

## 2020-12-23 PROCEDURE — 1126F PR PAIN SEVERITY QUANTIFIED, NO PAIN PRESENT: ICD-10-PCS | Mod: S$GLB,,, | Performed by: INTERNAL MEDICINE

## 2020-12-23 PROCEDURE — 86255 FLUORESCENT ANTIBODY SCREEN: CPT

## 2020-12-23 PROCEDURE — 80053 COMPREHEN METABOLIC PANEL: CPT

## 2020-12-23 PROCEDURE — 86431 RHEUMATOID FACTOR QUANT: CPT

## 2020-12-23 RX ORDER — NAPROXEN SODIUM 220 MG/1
81 TABLET, FILM COATED ORAL DAILY
COMMUNITY
Start: 2020-12-23 | End: 2021-05-13

## 2020-12-23 RX ORDER — ACETAMINOPHEN 500 MG
5000 TABLET ORAL DAILY
COMMUNITY
Start: 2020-12-23 | End: 2021-09-08

## 2020-12-23 RX ORDER — IBUPROFEN 100 MG/5ML
1000 SUSPENSION, ORAL (FINAL DOSE FORM) ORAL DAILY
COMMUNITY
Start: 2020-12-23

## 2020-12-23 RX ORDER — METOPROLOL TARTRATE 25 MG/1
25 TABLET, FILM COATED ORAL EVERY 6 HOURS PRN
Qty: 60 TABLET | Refills: 3
Start: 2020-12-23 | End: 2021-02-17

## 2020-12-23 RX ORDER — PRAVASTATIN SODIUM 40 MG/1
40 TABLET ORAL DAILY
Qty: 90 TABLET | Refills: 3 | Status: SHIPPED | OUTPATIENT
Start: 2020-12-23 | End: 2021-02-17

## 2020-12-28 ENCOUNTER — PATIENT MESSAGE (OUTPATIENT)
Dept: CARDIOLOGY | Facility: CLINIC | Age: 40
End: 2020-12-28

## 2020-12-28 LAB
ANCA AB TITR SER IF: NORMAL TITER
P-ANCA TITR SER IF: NORMAL TITER

## 2020-12-29 ENCOUNTER — PATIENT MESSAGE (OUTPATIENT)
Dept: CARDIOLOGY | Facility: CLINIC | Age: 40
End: 2020-12-29

## 2020-12-29 LAB — LPA SERPL-MCNC: 9 MG/DL (ref 0–30)

## 2020-12-31 ENCOUNTER — TELEPHONE (OUTPATIENT)
Dept: RHEUMATOLOGY | Facility: CLINIC | Age: 40
End: 2020-12-31

## 2020-12-31 ENCOUNTER — HOSPITAL ENCOUNTER (OUTPATIENT)
Dept: RADIOLOGY | Facility: HOSPITAL | Age: 40
Discharge: HOME | End: 2020-12-31
Attending: INTERNAL MEDICINE
Payer: COMMERCIAL

## 2020-12-31 DIAGNOSIS — M54.2 NECK PAIN: ICD-10-CM

## 2020-12-31 DIAGNOSIS — M25.541 ARTHRALGIA OF BOTH HANDS: ICD-10-CM

## 2020-12-31 DIAGNOSIS — M25.542 ARTHRALGIA OF BOTH HANDS: ICD-10-CM

## 2020-12-31 DIAGNOSIS — M25.542 ARTHRALGIA OF BOTH HANDS: Primary | ICD-10-CM

## 2020-12-31 DIAGNOSIS — M25.541 ARTHRALGIA OF BOTH HANDS: Primary | ICD-10-CM

## 2020-12-31 PROCEDURE — 73130 X-RAY EXAM OF HAND: CPT | Mod: TC,50,PO

## 2020-12-31 PROCEDURE — 73130 X-RAY EXAM OF HAND: CPT | Mod: 26,,, | Performed by: RADIOLOGY

## 2020-12-31 PROCEDURE — 73620 X-RAY EXAM OF FOOT: CPT | Mod: TC,50,PO

## 2020-12-31 PROCEDURE — 73620 XR FOOT AP BILAT: ICD-10-PCS | Mod: 26,,, | Performed by: RADIOLOGY

## 2020-12-31 PROCEDURE — 72040 X-RAY EXAM NECK SPINE 2-3 VW: CPT | Mod: 26,,, | Performed by: RADIOLOGY

## 2020-12-31 PROCEDURE — 73620 X-RAY EXAM OF FOOT: CPT | Mod: 26,,, | Performed by: RADIOLOGY

## 2020-12-31 PROCEDURE — 72040 X-RAY EXAM NECK SPINE 2-3 VW: CPT | Mod: TC,PO

## 2020-12-31 PROCEDURE — 72040 XR CERVICAL SPINE AP LATERAL: ICD-10-PCS | Mod: 26,,, | Performed by: RADIOLOGY

## 2020-12-31 PROCEDURE — 73130 XR HAND COMPLETE 3 VIEWS BILATERAL: ICD-10-PCS | Mod: 26,,, | Performed by: RADIOLOGY

## 2021-01-04 DIAGNOSIS — M05.742 RHEUMATOID ARTHRITIS INVOLVING BOTH HANDS WITH POSITIVE RHEUMATOID FACTOR: Primary | ICD-10-CM

## 2021-01-04 DIAGNOSIS — M05.741 RHEUMATOID ARTHRITIS INVOLVING BOTH HANDS WITH POSITIVE RHEUMATOID FACTOR: Primary | ICD-10-CM

## 2021-01-04 RX ORDER — HYDROXYCHLOROQUINE SULFATE 200 MG/1
200 TABLET, FILM COATED ORAL 2 TIMES DAILY
Qty: 60 TABLET | Refills: 6 | Status: SHIPPED | OUTPATIENT
Start: 2021-01-04 | End: 2021-09-11 | Stop reason: SDUPTHER

## 2021-01-13 ENCOUNTER — OFFICE VISIT (OUTPATIENT)
Dept: PULMONOLOGY | Facility: CLINIC | Age: 41
End: 2021-01-13
Payer: COMMERCIAL

## 2021-01-13 VITALS
HEART RATE: 83 BPM | HEIGHT: 72 IN | WEIGHT: 188.25 LBS | SYSTOLIC BLOOD PRESSURE: 124 MMHG | DIASTOLIC BLOOD PRESSURE: 82 MMHG | OXYGEN SATURATION: 98 % | BODY MASS INDEX: 25.5 KG/M2

## 2021-01-13 DIAGNOSIS — J45.30 MILD PERSISTENT ASTHMA, UNSPECIFIED WHETHER COMPLICATED: Primary | ICD-10-CM

## 2021-01-13 DIAGNOSIS — R91.8 MULTIPLE PULMONARY NODULES: ICD-10-CM

## 2021-01-13 DIAGNOSIS — R91.1 SOLITARY PULMONARY NODULE: ICD-10-CM

## 2021-01-13 PROCEDURE — 1126F AMNT PAIN NOTED NONE PRSNT: CPT | Mod: S$GLB,,, | Performed by: INTERNAL MEDICINE

## 2021-01-13 PROCEDURE — 3008F PR BODY MASS INDEX (BMI) DOCUMENTED: ICD-10-PCS | Mod: CPTII,S$GLB,, | Performed by: INTERNAL MEDICINE

## 2021-01-13 PROCEDURE — 99999 PR PBB SHADOW E&M-EST. PATIENT-LVL III: ICD-10-PCS | Mod: PBBFAC,,, | Performed by: INTERNAL MEDICINE

## 2021-01-13 PROCEDURE — 1126F PR PAIN SEVERITY QUANTIFIED, NO PAIN PRESENT: ICD-10-PCS | Mod: S$GLB,,, | Performed by: INTERNAL MEDICINE

## 2021-01-13 PROCEDURE — 99202 PR OFFICE/OUTPT VISIT, NEW, LEVL II, 15-29 MIN: ICD-10-PCS | Mod: S$GLB,,, | Performed by: INTERNAL MEDICINE

## 2021-01-13 PROCEDURE — 99202 OFFICE O/P NEW SF 15 MIN: CPT | Mod: S$GLB,,, | Performed by: INTERNAL MEDICINE

## 2021-01-13 PROCEDURE — 3008F BODY MASS INDEX DOCD: CPT | Mod: CPTII,S$GLB,, | Performed by: INTERNAL MEDICINE

## 2021-01-13 PROCEDURE — 99999 PR PBB SHADOW E&M-EST. PATIENT-LVL III: CPT | Mod: PBBFAC,,, | Performed by: INTERNAL MEDICINE

## 2021-01-22 DIAGNOSIS — R06.02 SHORTNESS OF BREATH: ICD-10-CM

## 2021-01-22 DIAGNOSIS — J45.30 MILD PERSISTENT ASTHMA WITHOUT COMPLICATION: Primary | ICD-10-CM

## 2021-01-22 DIAGNOSIS — R91.1 SOLITARY PULMONARY NODULE: ICD-10-CM

## 2021-02-11 ENCOUNTER — TELEPHONE (OUTPATIENT)
Dept: PULMONOLOGY | Facility: CLINIC | Age: 41
End: 2021-02-11

## 2021-02-13 ENCOUNTER — PATIENT OUTREACH (OUTPATIENT)
Dept: ADMINISTRATIVE | Facility: OTHER | Age: 41
End: 2021-02-13

## 2021-02-17 ENCOUNTER — OFFICE VISIT (OUTPATIENT)
Dept: RHEUMATOLOGY | Facility: CLINIC | Age: 41
End: 2021-02-17
Payer: COMMERCIAL

## 2021-02-17 DIAGNOSIS — E66.3 OVERWEIGHT (BMI 25.0-29.9): ICD-10-CM

## 2021-02-17 DIAGNOSIS — M05.79 RHEUMATOID ARTHRITIS INVOLVING MULTIPLE SITES WITH POSITIVE RHEUMATOID FACTOR: ICD-10-CM

## 2021-02-17 DIAGNOSIS — J32.9 RECURRENT SINUS INFECTIONS: ICD-10-CM

## 2021-02-17 DIAGNOSIS — R91.8 MULTIPLE PULMONARY NODULES: ICD-10-CM

## 2021-02-17 PROCEDURE — 99204 OFFICE O/P NEW MOD 45 MIN: CPT | Mod: 95,,, | Performed by: INTERNAL MEDICINE

## 2021-02-17 PROCEDURE — 1126F AMNT PAIN NOTED NONE PRSNT: CPT | Mod: ,,, | Performed by: INTERNAL MEDICINE

## 2021-02-17 PROCEDURE — 99204 PR OFFICE/OUTPT VISIT, NEW, LEVL IV, 45-59 MIN: ICD-10-PCS | Mod: 95,,, | Performed by: INTERNAL MEDICINE

## 2021-02-17 PROCEDURE — 1126F PR PAIN SEVERITY QUANTIFIED, NO PAIN PRESENT: ICD-10-PCS | Mod: ,,, | Performed by: INTERNAL MEDICINE

## 2021-02-17 ASSESSMENT — ROUTINE ASSESSMENT OF PATIENT INDEX DATA (RAPID3)
FATIGUE SCORE: 0
PATIENT GLOBAL ASSESSMENT SCORE: 0
MDHAQ FUNCTION SCORE: 0
PSYCHOLOGICAL DISTRESS SCORE: 0
TOTAL RAPID3 SCORE: 0
PAIN SCORE: 0

## 2021-02-23 ENCOUNTER — PATIENT MESSAGE (OUTPATIENT)
Dept: RHEUMATOLOGY | Facility: CLINIC | Age: 41
End: 2021-02-23

## 2021-03-08 ENCOUNTER — LAB VISIT (OUTPATIENT)
Dept: INTERNAL MEDICINE | Facility: CLINIC | Age: 41
End: 2021-03-08
Payer: COMMERCIAL

## 2021-03-08 DIAGNOSIS — R06.02 SHORTNESS OF BREATH: ICD-10-CM

## 2021-03-08 PROCEDURE — U0003 INFECTIOUS AGENT DETECTION BY NUCLEIC ACID (DNA OR RNA); SEVERE ACUTE RESPIRATORY SYNDROME CORONAVIRUS 2 (SARS-COV-2) (CORONAVIRUS DISEASE [COVID-19]), AMPLIFIED PROBE TECHNIQUE, MAKING USE OF HIGH THROUGHPUT TECHNOLOGIES AS DESCRIBED BY CMS-2020-01-R: HCPCS | Performed by: INTERNAL MEDICINE

## 2021-03-08 PROCEDURE — U0005 INFEC AGEN DETEC AMPLI PROBE: HCPCS | Performed by: INTERNAL MEDICINE

## 2021-03-09 LAB — SARS-COV-2 RNA RESP QL NAA+PROBE: NOT DETECTED

## 2021-03-10 ENCOUNTER — HOSPITAL ENCOUNTER (OUTPATIENT)
Dept: RADIOLOGY | Facility: HOSPITAL | Age: 41
Discharge: HOME | End: 2021-03-10
Attending: INTERNAL MEDICINE
Payer: COMMERCIAL

## 2021-03-10 ENCOUNTER — HOSPITAL ENCOUNTER (OUTPATIENT)
Dept: PULMONOLOGY | Facility: CLINIC | Age: 41
Discharge: HOME | End: 2021-03-10
Payer: COMMERCIAL

## 2021-03-10 DIAGNOSIS — R91.1 SOLITARY PULMONARY NODULE: ICD-10-CM

## 2021-03-10 DIAGNOSIS — J45.30 MILD PERSISTENT ASTHMA WITHOUT COMPLICATION: ICD-10-CM

## 2021-03-10 PROCEDURE — 94729 PR C02/MEMBANE DIFFUSE CAPACITY: ICD-10-PCS | Mod: S$GLB,,, | Performed by: INTERNAL MEDICINE

## 2021-03-10 PROCEDURE — 94729 DIFFUSING CAPACITY: CPT | Mod: S$GLB,,, | Performed by: INTERNAL MEDICINE

## 2021-03-10 PROCEDURE — 94727 GAS DIL/WSHOT DETER LNG VOL: CPT | Mod: S$GLB,,, | Performed by: INTERNAL MEDICINE

## 2021-03-10 PROCEDURE — 71250 CT THORAX DX C-: CPT | Mod: 26,,, | Performed by: RADIOLOGY

## 2021-03-10 PROCEDURE — 94060 EVALUATION OF WHEEZING: CPT | Mod: S$GLB,,, | Performed by: INTERNAL MEDICINE

## 2021-03-10 PROCEDURE — 94060 PR EVAL OF BRONCHOSPASM: ICD-10-PCS | Mod: S$GLB,,, | Performed by: INTERNAL MEDICINE

## 2021-03-10 PROCEDURE — 71250 CT CHEST WITHOUT CONTRAST: ICD-10-PCS | Mod: 26,,, | Performed by: RADIOLOGY

## 2021-03-10 PROCEDURE — 94727 PR PULM FUNCTION TEST BY GAS: ICD-10-PCS | Mod: S$GLB,,, | Performed by: INTERNAL MEDICINE

## 2021-03-10 PROCEDURE — 71250 CT THORAX DX C-: CPT | Mod: TC

## 2021-03-12 ENCOUNTER — DOCUMENTATION ONLY (OUTPATIENT)
Dept: PULMONOLOGY | Facility: CLINIC | Age: 41
End: 2021-03-12

## 2021-03-30 PROBLEM — I25.10 ATHEROSCLEROSIS OF NATIVE CORONARY ARTERY OF NATIVE HEART WITHOUT ANGINA PECTORIS: Status: RESOLVED | Noted: 2020-12-22 | Resolved: 2021-03-30

## 2021-03-30 PROBLEM — E78.5 DYSLIPIDEMIA: Status: RESOLVED | Noted: 2020-12-14 | Resolved: 2021-03-30

## 2021-03-30 PROBLEM — R94.39 ABNORMAL STRESS TEST: Status: RESOLVED | Noted: 2020-12-16 | Resolved: 2021-03-30

## 2021-04-01 ENCOUNTER — OFFICE VISIT (OUTPATIENT)
Dept: OTOLARYNGOLOGY | Facility: CLINIC | Age: 41
End: 2021-04-01
Payer: COMMERCIAL

## 2021-04-01 VITALS
HEART RATE: 79 BPM | BODY MASS INDEX: 25.47 KG/M2 | HEIGHT: 72 IN | WEIGHT: 188 LBS | DIASTOLIC BLOOD PRESSURE: 80 MMHG | SYSTOLIC BLOOD PRESSURE: 127 MMHG

## 2021-04-01 DIAGNOSIS — J45.20 MILD INTERMITTENT ASTHMA WITHOUT COMPLICATION: ICD-10-CM

## 2021-04-01 DIAGNOSIS — M05.79 RHEUMATOID ARTHRITIS INVOLVING MULTIPLE SITES WITH POSITIVE RHEUMATOID FACTOR: ICD-10-CM

## 2021-04-01 DIAGNOSIS — J33.9 NASAL POLYPOSIS: ICD-10-CM

## 2021-04-01 DIAGNOSIS — J31.0 CHRONIC RHINITIS: Primary | ICD-10-CM

## 2021-04-01 DIAGNOSIS — J32.4 CHRONIC PANSINUSITIS: ICD-10-CM

## 2021-04-01 PROCEDURE — 99999 PR PBB SHADOW E&M-EST. PATIENT-LVL IV: ICD-10-PCS | Mod: PBBFAC,,, | Performed by: OTOLARYNGOLOGY

## 2021-04-01 PROCEDURE — 31231 NASAL ENDOSCOPY DX: CPT | Mod: S$GLB,,, | Performed by: OTOLARYNGOLOGY

## 2021-04-01 PROCEDURE — 99214 PR OFFICE/OUTPT VISIT, EST, LEVL IV, 30-39 MIN: ICD-10-PCS | Mod: 25,S$GLB,, | Performed by: OTOLARYNGOLOGY

## 2021-04-01 PROCEDURE — 99214 OFFICE O/P EST MOD 30 MIN: CPT | Mod: 25,S$GLB,, | Performed by: OTOLARYNGOLOGY

## 2021-04-01 PROCEDURE — 1126F AMNT PAIN NOTED NONE PRSNT: CPT | Mod: S$GLB,,, | Performed by: OTOLARYNGOLOGY

## 2021-04-01 PROCEDURE — 99999 PR PBB SHADOW E&M-EST. PATIENT-LVL IV: CPT | Mod: PBBFAC,,, | Performed by: OTOLARYNGOLOGY

## 2021-04-01 PROCEDURE — 3008F BODY MASS INDEX DOCD: CPT | Mod: CPTII,S$GLB,, | Performed by: OTOLARYNGOLOGY

## 2021-04-01 PROCEDURE — 1126F PR PAIN SEVERITY QUANTIFIED, NO PAIN PRESENT: ICD-10-PCS | Mod: S$GLB,,, | Performed by: OTOLARYNGOLOGY

## 2021-04-01 PROCEDURE — 3008F PR BODY MASS INDEX (BMI) DOCUMENTED: ICD-10-PCS | Mod: CPTII,S$GLB,, | Performed by: OTOLARYNGOLOGY

## 2021-04-01 PROCEDURE — 31231 NASAL/SINUS ENDOSCOPY: ICD-10-PCS | Mod: S$GLB,,, | Performed by: OTOLARYNGOLOGY

## 2021-04-01 RX ORDER — BUDESONIDE 0.5 MG/2ML
0.5 INHALANT ORAL 2 TIMES DAILY
Qty: 360 ML | Refills: 1 | Status: SHIPPED | OUTPATIENT
Start: 2021-04-01 | End: 2021-06-30

## 2021-04-08 ENCOUNTER — PATIENT MESSAGE (OUTPATIENT)
Dept: OTOLARYNGOLOGY | Facility: CLINIC | Age: 41
End: 2021-04-08

## 2021-04-16 ENCOUNTER — PATIENT MESSAGE (OUTPATIENT)
Dept: OTOLARYNGOLOGY | Facility: CLINIC | Age: 41
End: 2021-04-16

## 2021-04-16 ENCOUNTER — PATIENT MESSAGE (OUTPATIENT)
Dept: RESEARCH | Facility: HOSPITAL | Age: 41
End: 2021-04-16

## 2021-04-19 ENCOUNTER — PATIENT OUTREACH (OUTPATIENT)
Dept: ADMINISTRATIVE | Facility: OTHER | Age: 41
End: 2021-04-19

## 2021-04-20 ENCOUNTER — CLINICAL SUPPORT (OUTPATIENT)
Dept: AUDIOLOGY | Facility: CLINIC | Age: 41
End: 2021-04-20
Payer: COMMERCIAL

## 2021-04-20 ENCOUNTER — OFFICE VISIT (OUTPATIENT)
Dept: ALLERGY | Facility: CLINIC | Age: 41
End: 2021-04-20
Payer: COMMERCIAL

## 2021-04-20 ENCOUNTER — SPECIALTY PHARMACY (OUTPATIENT)
Dept: PHARMACY | Facility: CLINIC | Age: 41
End: 2021-04-20

## 2021-04-20 DIAGNOSIS — M05.79 RHEUMATOID ARTHRITIS INVOLVING MULTIPLE SITES WITH POSITIVE RHEUMATOID FACTOR: ICD-10-CM

## 2021-04-20 DIAGNOSIS — H90.A21 SENSORINEURAL HEARING LOSS (SNHL) OF RIGHT EAR WITH RESTRICTED HEARING OF LEFT EAR: Primary | ICD-10-CM

## 2021-04-20 DIAGNOSIS — J32.9 RECURRENT SINUS INFECTIONS: ICD-10-CM

## 2021-04-20 DIAGNOSIS — J30.81 ALLERGIC RHINITIS DUE TO ANIMAL HAIR AND DANDER: Primary | ICD-10-CM

## 2021-04-20 DIAGNOSIS — J30.89 ALLERGIC RHINITIS DUE TO DUST MITE: ICD-10-CM

## 2021-04-20 DIAGNOSIS — J33.9 NASAL POLYPOSIS: ICD-10-CM

## 2021-04-20 PROCEDURE — 92567 TYMPANOMETRY: CPT | Mod: S$GLB,,, | Performed by: AUDIOLOGIST

## 2021-04-20 PROCEDURE — 99999 PR PBB SHADOW E&M-EST. PATIENT-LVL I: ICD-10-PCS | Mod: PBBFAC,,,

## 2021-04-20 PROCEDURE — 92557 COMPREHENSIVE HEARING TEST: CPT | Mod: S$GLB,,, | Performed by: AUDIOLOGIST

## 2021-04-20 PROCEDURE — 92567 PR TYMPA2METRY: ICD-10-PCS | Mod: S$GLB,,, | Performed by: AUDIOLOGIST

## 2021-04-20 PROCEDURE — 99214 OFFICE O/P EST MOD 30 MIN: CPT | Mod: S$GLB,,, | Performed by: ALLERGY & IMMUNOLOGY

## 2021-04-20 PROCEDURE — 92557 PR COMPREHENSIVE HEARING TEST: ICD-10-PCS | Mod: S$GLB,,, | Performed by: AUDIOLOGIST

## 2021-04-20 PROCEDURE — 99999 PR PBB SHADOW E&M-EST. PATIENT-LVL I: CPT | Mod: PBBFAC,,,

## 2021-04-20 PROCEDURE — 99999 PR PBB SHADOW E&M-EST. PATIENT-LVL II: ICD-10-PCS | Mod: PBBFAC,,, | Performed by: ALLERGY & IMMUNOLOGY

## 2021-04-20 PROCEDURE — 99999 PR PBB SHADOW E&M-EST. PATIENT-LVL II: CPT | Mod: PBBFAC,,, | Performed by: ALLERGY & IMMUNOLOGY

## 2021-04-20 PROCEDURE — 99214 PR OFFICE/OUTPT VISIT, EST, LEVL IV, 30-39 MIN: ICD-10-PCS | Mod: S$GLB,,, | Performed by: ALLERGY & IMMUNOLOGY

## 2021-04-21 ENCOUNTER — TELEPHONE (OUTPATIENT)
Dept: OTOLARYNGOLOGY | Facility: CLINIC | Age: 41
End: 2021-04-21

## 2021-04-21 DIAGNOSIS — H90.3 ASYMMETRICAL SENSORINEURAL HEARING LOSS: Primary | ICD-10-CM

## 2021-04-21 DIAGNOSIS — H91.21 SUDDEN IDIOPATHIC HEARING LOSS OF RIGHT EAR WITH UNRESTRICTED HEARING OF LEFT EAR: ICD-10-CM

## 2021-04-21 PROBLEM — J33.9 NASAL POLYPOSIS: Status: ACTIVE | Noted: 2021-04-21

## 2021-04-21 RX ORDER — PREDNISONE 10 MG/1
TABLET ORAL
Qty: 90 TABLET | Refills: 0 | Status: SHIPPED | OUTPATIENT
Start: 2021-04-21 | End: 2021-05-13

## 2021-04-26 ENCOUNTER — TELEPHONE (OUTPATIENT)
Dept: ALLERGY | Facility: CLINIC | Age: 41
End: 2021-04-26

## 2021-05-05 ENCOUNTER — PATIENT MESSAGE (OUTPATIENT)
Dept: ALLERGY | Facility: CLINIC | Age: 41
End: 2021-05-05

## 2021-05-06 ENCOUNTER — PATIENT MESSAGE (OUTPATIENT)
Dept: OTOLARYNGOLOGY | Facility: CLINIC | Age: 41
End: 2021-05-06

## 2021-05-06 DIAGNOSIS — R42 VERTIGO: Primary | ICD-10-CM

## 2021-05-10 ENCOUNTER — SPECIALTY PHARMACY (OUTPATIENT)
Dept: PHARMACY | Facility: CLINIC | Age: 41
End: 2021-05-10

## 2021-05-10 RX ORDER — EPINEPHRINE 0.3 MG/.3ML
2 INJECTION SUBCUTANEOUS ONCE
Qty: 2 EACH | Refills: 0 | Status: SHIPPED | OUTPATIENT
Start: 2021-05-10 | End: 2021-05-12

## 2021-05-11 ENCOUNTER — TELEPHONE (OUTPATIENT)
Dept: ALLERGY | Facility: CLINIC | Age: 41
End: 2021-05-11

## 2021-05-11 ENCOUNTER — TELEPHONE (OUTPATIENT)
Dept: PHARMACY | Facility: CLINIC | Age: 41
End: 2021-05-11

## 2021-05-13 ENCOUNTER — OFFICE VISIT (OUTPATIENT)
Dept: ALLERGY | Facility: CLINIC | Age: 41
End: 2021-05-13
Payer: COMMERCIAL

## 2021-05-13 VITALS — BODY MASS INDEX: 24.34 KG/M2 | HEIGHT: 72 IN | WEIGHT: 179.69 LBS

## 2021-05-13 DIAGNOSIS — J30.9 ALLERGIC RHINITIS, UNSPECIFIED SEASONALITY, UNSPECIFIED TRIGGER: Primary | ICD-10-CM

## 2021-05-13 PROCEDURE — 99214 PR OFFICE/OUTPT VISIT, EST, LEVL IV, 30-39 MIN: ICD-10-PCS | Mod: S$GLB,,, | Performed by: ALLERGY & IMMUNOLOGY

## 2021-05-13 PROCEDURE — 3008F BODY MASS INDEX DOCD: CPT | Mod: CPTII,S$GLB,, | Performed by: ALLERGY & IMMUNOLOGY

## 2021-05-13 PROCEDURE — 99214 OFFICE O/P EST MOD 30 MIN: CPT | Mod: S$GLB,,, | Performed by: ALLERGY & IMMUNOLOGY

## 2021-05-13 PROCEDURE — 99999 PR PBB SHADOW E&M-EST. PATIENT-LVL III: ICD-10-PCS | Mod: PBBFAC,,, | Performed by: ALLERGY & IMMUNOLOGY

## 2021-05-13 PROCEDURE — 1126F PR PAIN SEVERITY QUANTIFIED, NO PAIN PRESENT: ICD-10-PCS | Mod: S$GLB,,, | Performed by: ALLERGY & IMMUNOLOGY

## 2021-05-13 PROCEDURE — 99999 PR PBB SHADOW E&M-EST. PATIENT-LVL III: CPT | Mod: PBBFAC,,, | Performed by: ALLERGY & IMMUNOLOGY

## 2021-05-13 PROCEDURE — 1126F AMNT PAIN NOTED NONE PRSNT: CPT | Mod: S$GLB,,, | Performed by: ALLERGY & IMMUNOLOGY

## 2021-05-13 PROCEDURE — 3008F PR BODY MASS INDEX (BMI) DOCUMENTED: ICD-10-PCS | Mod: CPTII,S$GLB,, | Performed by: ALLERGY & IMMUNOLOGY

## 2021-05-17 ENCOUNTER — OFFICE VISIT (OUTPATIENT)
Dept: RHEUMATOLOGY | Facility: CLINIC | Age: 41
End: 2021-05-17
Payer: COMMERCIAL

## 2021-05-17 ENCOUNTER — PATIENT MESSAGE (OUTPATIENT)
Dept: OTOLARYNGOLOGY | Facility: CLINIC | Age: 41
End: 2021-05-17

## 2021-05-17 VITALS
WEIGHT: 185.44 LBS | DIASTOLIC BLOOD PRESSURE: 86 MMHG | HEIGHT: 72 IN | SYSTOLIC BLOOD PRESSURE: 126 MMHG | HEART RATE: 83 BPM | BODY MASS INDEX: 25.12 KG/M2

## 2021-05-17 DIAGNOSIS — M05.79 RHEUMATOID ARTHRITIS INVOLVING MULTIPLE SITES WITH POSITIVE RHEUMATOID FACTOR: Primary | ICD-10-CM

## 2021-05-17 PROCEDURE — 99213 PR OFFICE/OUTPT VISIT, EST, LEVL III, 20-29 MIN: ICD-10-PCS | Mod: S$GLB,,, | Performed by: INTERNAL MEDICINE

## 2021-05-17 PROCEDURE — 99999 PR PBB SHADOW E&M-EST. PATIENT-LVL III: ICD-10-PCS | Mod: PBBFAC,,, | Performed by: INTERNAL MEDICINE

## 2021-05-17 PROCEDURE — 1126F PR PAIN SEVERITY QUANTIFIED, NO PAIN PRESENT: ICD-10-PCS | Mod: S$GLB,,, | Performed by: INTERNAL MEDICINE

## 2021-05-17 PROCEDURE — 99999 PR PBB SHADOW E&M-EST. PATIENT-LVL III: CPT | Mod: PBBFAC,,, | Performed by: INTERNAL MEDICINE

## 2021-05-17 PROCEDURE — 3008F PR BODY MASS INDEX (BMI) DOCUMENTED: ICD-10-PCS | Mod: CPTII,S$GLB,, | Performed by: INTERNAL MEDICINE

## 2021-05-17 PROCEDURE — 1126F AMNT PAIN NOTED NONE PRSNT: CPT | Mod: S$GLB,,, | Performed by: INTERNAL MEDICINE

## 2021-05-17 PROCEDURE — 3008F BODY MASS INDEX DOCD: CPT | Mod: CPTII,S$GLB,, | Performed by: INTERNAL MEDICINE

## 2021-05-17 PROCEDURE — 99213 OFFICE O/P EST LOW 20 MIN: CPT | Mod: S$GLB,,, | Performed by: INTERNAL MEDICINE

## 2021-05-25 ENCOUNTER — PATIENT MESSAGE (OUTPATIENT)
Dept: OTOLARYNGOLOGY | Facility: CLINIC | Age: 41
End: 2021-05-25

## 2021-05-25 ENCOUNTER — HOSPITAL ENCOUNTER (OUTPATIENT)
Dept: RADIOLOGY | Facility: HOSPITAL | Age: 41
Discharge: HOME | End: 2021-05-25
Attending: OTOLARYNGOLOGY
Payer: COMMERCIAL

## 2021-05-25 DIAGNOSIS — H91.21 SUDDEN IDIOPATHIC HEARING LOSS OF RIGHT EAR WITH UNRESTRICTED HEARING OF LEFT EAR: ICD-10-CM

## 2021-05-25 PROCEDURE — 70553 MRI IAC/TEMPORAL BONES W W/O CONTRAST: ICD-10-PCS | Mod: 26,,, | Performed by: RADIOLOGY

## 2021-05-25 PROCEDURE — 70553 MRI BRAIN STEM W/O & W/DYE: CPT | Mod: 26,,, | Performed by: RADIOLOGY

## 2021-05-25 PROCEDURE — 25500020 PHARM REV CODE 255: Performed by: OTOLARYNGOLOGY

## 2021-05-25 PROCEDURE — A9585 GADOBUTROL INJECTION: HCPCS | Performed by: OTOLARYNGOLOGY

## 2021-05-25 PROCEDURE — 70553 MRI BRAIN STEM W/O & W/DYE: CPT | Mod: TC

## 2021-05-25 RX ORDER — GADOBUTROL 604.72 MG/ML
9 INJECTION INTRAVENOUS
Status: COMPLETED | OUTPATIENT
Start: 2021-05-25 | End: 2021-05-25

## 2021-05-25 RX ADMIN — GADOBUTROL 9 ML: 604.72 INJECTION INTRAVENOUS at 07:05

## 2021-06-14 ENCOUNTER — SPECIALTY PHARMACY (OUTPATIENT)
Dept: PHARMACY | Facility: CLINIC | Age: 41
End: 2021-06-14

## 2021-06-15 RX ORDER — EPINEPHRINE 0.3 MG/.3ML
1 INJECTION SUBCUTANEOUS ONCE
Qty: 0.3 ML | Refills: 1 | OUTPATIENT
Start: 2021-06-15 | End: 2021-06-15

## 2021-06-16 ENCOUNTER — PATIENT MESSAGE (OUTPATIENT)
Dept: OTOLARYNGOLOGY | Facility: CLINIC | Age: 41
End: 2021-06-16

## 2021-07-07 ENCOUNTER — PATIENT MESSAGE (OUTPATIENT)
Dept: PHARMACY | Facility: CLINIC | Age: 41
End: 2021-07-07

## 2021-07-13 ENCOUNTER — SPECIALTY PHARMACY (OUTPATIENT)
Dept: PHARMACY | Facility: CLINIC | Age: 41
End: 2021-07-13

## 2021-07-14 ENCOUNTER — PATIENT MESSAGE (OUTPATIENT)
Dept: OTOLARYNGOLOGY | Facility: CLINIC | Age: 41
End: 2021-07-14

## 2021-07-14 ENCOUNTER — PATIENT MESSAGE (OUTPATIENT)
Dept: RHEUMATOLOGY | Facility: CLINIC | Age: 41
End: 2021-07-14

## 2021-07-15 ENCOUNTER — TELEPHONE (OUTPATIENT)
Dept: OTOLARYNGOLOGY | Facility: CLINIC | Age: 41
End: 2021-07-15

## 2021-07-22 ENCOUNTER — PATIENT MESSAGE (OUTPATIENT)
Dept: ALLERGY | Facility: CLINIC | Age: 41
End: 2021-07-22

## 2021-07-23 ENCOUNTER — OFFICE VISIT (OUTPATIENT)
Dept: OTOLARYNGOLOGY | Facility: CLINIC | Age: 41
End: 2021-07-23
Payer: COMMERCIAL

## 2021-07-23 ENCOUNTER — CLINICAL SUPPORT (OUTPATIENT)
Dept: AUDIOLOGY | Facility: CLINIC | Age: 41
End: 2021-07-23
Payer: COMMERCIAL

## 2021-07-23 VITALS — BODY MASS INDEX: 24.88 KG/M2 | WEIGHT: 183.44 LBS

## 2021-07-23 DIAGNOSIS — H90.3 SENSORINEURAL HEARING LOSS, BILATERAL: Primary | ICD-10-CM

## 2021-07-23 DIAGNOSIS — H91.21 SUDDEN RIGHT HEARING LOSS: Primary | ICD-10-CM

## 2021-07-23 DIAGNOSIS — H93.11 TINNITUS, RIGHT: ICD-10-CM

## 2021-07-23 PROCEDURE — 3008F PR BODY MASS INDEX (BMI) DOCUMENTED: ICD-10-PCS | Mod: CPTII,S$GLB,, | Performed by: OTOLARYNGOLOGY

## 2021-07-23 PROCEDURE — 92557 COMPREHENSIVE HEARING TEST: CPT | Mod: 52,S$GLB,, | Performed by: AUDIOLOGIST

## 2021-07-23 PROCEDURE — 3008F BODY MASS INDEX DOCD: CPT | Mod: CPTII,S$GLB,, | Performed by: OTOLARYNGOLOGY

## 2021-07-23 PROCEDURE — 99999 PR PBB SHADOW E&M-EST. PATIENT-LVL III: ICD-10-PCS | Mod: PBBFAC,,, | Performed by: OTOLARYNGOLOGY

## 2021-07-23 PROCEDURE — 92567 PR TYMPA2METRY: ICD-10-PCS | Mod: 52,S$GLB,, | Performed by: AUDIOLOGIST

## 2021-07-23 PROCEDURE — 92557 PR COMPREHENSIVE HEARING TEST: ICD-10-PCS | Mod: 52,S$GLB,, | Performed by: AUDIOLOGIST

## 2021-07-23 PROCEDURE — 99214 OFFICE O/P EST MOD 30 MIN: CPT | Mod: S$GLB,,, | Performed by: OTOLARYNGOLOGY

## 2021-07-23 PROCEDURE — 92567 TYMPANOMETRY: CPT | Mod: 52,S$GLB,, | Performed by: AUDIOLOGIST

## 2021-07-23 PROCEDURE — 99214 PR OFFICE/OUTPT VISIT, EST, LEVL IV, 30-39 MIN: ICD-10-PCS | Mod: S$GLB,,, | Performed by: OTOLARYNGOLOGY

## 2021-07-23 PROCEDURE — 1126F PR PAIN SEVERITY QUANTIFIED, NO PAIN PRESENT: ICD-10-PCS | Mod: CPTII,S$GLB,, | Performed by: OTOLARYNGOLOGY

## 2021-07-23 PROCEDURE — 99999 PR PBB SHADOW E&M-EST. PATIENT-LVL III: CPT | Mod: PBBFAC,,, | Performed by: OTOLARYNGOLOGY

## 2021-07-23 PROCEDURE — 1126F AMNT PAIN NOTED NONE PRSNT: CPT | Mod: CPTII,S$GLB,, | Performed by: OTOLARYNGOLOGY

## 2021-07-23 RX ORDER — FLUTICASONE FUROATE AND VILANTEROL TRIFENATATE 200; 25 UG/1; UG/1
POWDER RESPIRATORY (INHALATION)
Qty: 60 EACH | Refills: 11 | Status: SHIPPED | OUTPATIENT
Start: 2021-07-23

## 2021-08-05 ENCOUNTER — PATIENT MESSAGE (OUTPATIENT)
Dept: PHARMACY | Facility: CLINIC | Age: 41
End: 2021-08-05

## 2021-08-09 ENCOUNTER — SPECIALTY PHARMACY (OUTPATIENT)
Dept: PHARMACY | Facility: CLINIC | Age: 41
End: 2021-08-09

## 2021-08-20 ENCOUNTER — PATIENT MESSAGE (OUTPATIENT)
Dept: OTOLARYNGOLOGY | Facility: CLINIC | Age: 41
End: 2021-08-20

## 2021-08-20 DIAGNOSIS — Z01.818 PRE-OP TESTING: ICD-10-CM

## 2021-08-23 ENCOUNTER — PATIENT OUTREACH (OUTPATIENT)
Dept: ADMINISTRATIVE | Facility: OTHER | Age: 41
End: 2021-08-23

## 2021-09-07 ENCOUNTER — PATIENT MESSAGE (OUTPATIENT)
Dept: ALLERGY | Facility: CLINIC | Age: 41
End: 2021-09-07

## 2021-09-07 ENCOUNTER — PATIENT MESSAGE (OUTPATIENT)
Dept: OTOLARYNGOLOGY | Facility: CLINIC | Age: 41
End: 2021-09-07

## 2021-09-08 ENCOUNTER — OFFICE VISIT (OUTPATIENT)
Dept: OTOLARYNGOLOGY | Facility: CLINIC | Age: 41
End: 2021-09-08
Payer: COMMERCIAL

## 2021-09-08 ENCOUNTER — OFFICE VISIT (OUTPATIENT)
Dept: ALLERGY | Facility: CLINIC | Age: 41
End: 2021-09-08
Payer: COMMERCIAL

## 2021-09-08 VITALS — WEIGHT: 185.88 LBS | BODY MASS INDEX: 25.21 KG/M2

## 2021-09-08 DIAGNOSIS — M05.79 RHEUMATOID ARTHRITIS INVOLVING MULTIPLE SITES WITH POSITIVE RHEUMATOID FACTOR: ICD-10-CM

## 2021-09-08 DIAGNOSIS — J32.4 CHRONIC PANSINUSITIS: Primary | ICD-10-CM

## 2021-09-08 DIAGNOSIS — J33.9 NASAL POLYPOSIS: ICD-10-CM

## 2021-09-08 DIAGNOSIS — J33.9 NASAL POLYPOSIS: Primary | ICD-10-CM

## 2021-09-08 DIAGNOSIS — M05.741 RHEUMATOID ARTHRITIS INVOLVING BOTH HANDS WITH POSITIVE RHEUMATOID FACTOR: ICD-10-CM

## 2021-09-08 DIAGNOSIS — J30.81 ALLERGIC RHINITIS DUE TO ANIMAL HAIR AND DANDER: ICD-10-CM

## 2021-09-08 DIAGNOSIS — H10.13 ALLERGIC CONJUNCTIVITIS, BILATERAL: ICD-10-CM

## 2021-09-08 DIAGNOSIS — M05.742 RHEUMATOID ARTHRITIS INVOLVING BOTH HANDS WITH POSITIVE RHEUMATOID FACTOR: ICD-10-CM

## 2021-09-08 PROCEDURE — 1160F RVW MEDS BY RX/DR IN RCRD: CPT | Mod: CPTII,S$GLB,, | Performed by: OTOLARYNGOLOGY

## 2021-09-08 PROCEDURE — 99214 OFFICE O/P EST MOD 30 MIN: CPT | Mod: 95,,, | Performed by: ALLERGY & IMMUNOLOGY

## 2021-09-08 PROCEDURE — 99214 PR OFFICE/OUTPT VISIT, EST, LEVL IV, 30-39 MIN: ICD-10-PCS | Mod: 95,,, | Performed by: ALLERGY & IMMUNOLOGY

## 2021-09-08 PROCEDURE — 1160F PR REVIEW ALL MEDS BY PRESCRIBER/CLIN PHARMACIST DOCUMENTED: ICD-10-PCS | Mod: CPTII,S$GLB,, | Performed by: OTOLARYNGOLOGY

## 2021-09-08 PROCEDURE — 1159F PR MEDICATION LIST DOCUMENTED IN MEDICAL RECORD: ICD-10-PCS | Mod: CPTII,S$GLB,, | Performed by: OTOLARYNGOLOGY

## 2021-09-08 PROCEDURE — 31231 NASAL/SINUS ENDOSCOPY: ICD-10-PCS | Mod: S$GLB,,, | Performed by: OTOLARYNGOLOGY

## 2021-09-08 PROCEDURE — 31231 NASAL ENDOSCOPY DX: CPT | Mod: S$GLB,,, | Performed by: OTOLARYNGOLOGY

## 2021-09-08 PROCEDURE — 1160F PR REVIEW ALL MEDS BY PRESCRIBER/CLIN PHARMACIST DOCUMENTED: ICD-10-PCS | Mod: CPTII,95,, | Performed by: ALLERGY & IMMUNOLOGY

## 2021-09-08 PROCEDURE — 3008F BODY MASS INDEX DOCD: CPT | Mod: CPTII,S$GLB,, | Performed by: OTOLARYNGOLOGY

## 2021-09-08 PROCEDURE — 1160F RVW MEDS BY RX/DR IN RCRD: CPT | Mod: CPTII,95,, | Performed by: ALLERGY & IMMUNOLOGY

## 2021-09-08 PROCEDURE — 99999 PR PBB SHADOW E&M-EST. PATIENT-LVL III: CPT | Mod: PBBFAC,,, | Performed by: OTOLARYNGOLOGY

## 2021-09-08 PROCEDURE — 1159F MED LIST DOCD IN RCRD: CPT | Mod: CPTII,S$GLB,, | Performed by: OTOLARYNGOLOGY

## 2021-09-08 PROCEDURE — 1159F PR MEDICATION LIST DOCUMENTED IN MEDICAL RECORD: ICD-10-PCS | Mod: CPTII,95,, | Performed by: ALLERGY & IMMUNOLOGY

## 2021-09-08 PROCEDURE — 99213 OFFICE O/P EST LOW 20 MIN: CPT | Mod: 25,S$GLB,, | Performed by: OTOLARYNGOLOGY

## 2021-09-08 PROCEDURE — 1159F MED LIST DOCD IN RCRD: CPT | Mod: CPTII,95,, | Performed by: ALLERGY & IMMUNOLOGY

## 2021-09-08 PROCEDURE — 99213 PR OFFICE/OUTPT VISIT, EST, LEVL III, 20-29 MIN: ICD-10-PCS | Mod: 25,S$GLB,, | Performed by: OTOLARYNGOLOGY

## 2021-09-08 PROCEDURE — 3008F PR BODY MASS INDEX (BMI) DOCUMENTED: ICD-10-PCS | Mod: CPTII,S$GLB,, | Performed by: OTOLARYNGOLOGY

## 2021-09-08 PROCEDURE — 99999 PR PBB SHADOW E&M-EST. PATIENT-LVL III: ICD-10-PCS | Mod: PBBFAC,,, | Performed by: OTOLARYNGOLOGY

## 2021-09-11 ENCOUNTER — SPECIALTY PHARMACY (OUTPATIENT)
Dept: PHARMACY | Facility: CLINIC | Age: 41
End: 2021-09-11

## 2021-09-11 RX ORDER — HYDROXYCHLOROQUINE SULFATE 200 MG/1
200 TABLET, FILM COATED ORAL 2 TIMES DAILY
Qty: 60 TABLET | Refills: 0 | Status: SHIPPED | OUTPATIENT
Start: 2021-09-11

## 2021-09-12 RX ORDER — DERMATOPHAGOIDES PTERONYSSINUS AND DERMATOPHAGOIDES FARINAE 6; 6 [ARB'U]/1; [ARB'U]/1
1 TABLET SUBLINGUAL DAILY
Qty: 30 TABLET | Refills: 11 | Status: SHIPPED | OUTPATIENT
Start: 2021-09-12

## 2021-09-13 PROBLEM — H10.13 ALLERGIC CONJUNCTIVITIS, BILATERAL: Status: ACTIVE | Noted: 2021-09-13

## 2021-09-15 ENCOUNTER — SPECIALTY PHARMACY (OUTPATIENT)
Dept: PHARMACY | Facility: CLINIC | Age: 41
End: 2021-09-15

## 2021-10-06 ENCOUNTER — PATIENT MESSAGE (OUTPATIENT)
Dept: PHARMACY | Facility: CLINIC | Age: 41
End: 2021-10-06

## 2021-10-06 ENCOUNTER — SPECIALTY PHARMACY (OUTPATIENT)
Dept: PHARMACY | Facility: CLINIC | Age: 41
End: 2021-10-06

## 2022-01-26 ENCOUNTER — PATIENT MESSAGE (OUTPATIENT)
Dept: ADMINISTRATIVE | Facility: HOSPITAL | Age: 42
End: 2022-01-26
Payer: COMMERCIAL

## 2022-02-09 DIAGNOSIS — R91.1 SOLITARY PULMONARY NODULE: ICD-10-CM

## 2022-02-18 ENCOUNTER — TELEPHONE (OUTPATIENT)
Dept: PULMONOLOGY | Facility: CLINIC | Age: 42
End: 2022-02-18
Payer: COMMERCIAL

## 2022-02-18 NOTE — TELEPHONE ENCOUNTER
Spoke with patient, informed him that I'm contacting him in regards to scheduling his appointment with Dr Kemp. Patient verbalized that he understand and states that he moved to [facility] and plans to follow up with a physician out there. I verbalized to patient that I understand.

## 2022-03-16 ENCOUNTER — PATIENT MESSAGE (OUTPATIENT)
Dept: ADMINISTRATIVE | Facility: HOSPITAL | Age: 42
End: 2022-03-16
Payer: COMMERCIAL

## 2023-08-16 ENCOUNTER — PATIENT MESSAGE (OUTPATIENT)
Dept: INTERNAL MEDICINE | Facility: CLINIC | Age: 43
End: 2023-08-16
Payer: COMMERCIAL

## 2024-11-22 NOTE — PROGRESS NOTES
S/p FESS on 12/16/19 with ethmoids, maxillaries, sphenoids, and frontal DrafII A procedure performed as well as septoplasty and turbinate reduction.    Patient returns today after ER visit on 12/23 in which there was concern for orbital cellulitis, but ultimately determined to be preseptal cellulitis. He was placed on 10 day course of Augmentin at that time, which he has yet to complete. His pain has been improving over past 24 hours although he reports poor sleep. Currently taking Motrin 800 q 6hrs alternating with Tylenol 1000 mg. Mil-moderate amount of thick yellow drainage bilaterally, but denies clear drainage from nose as well as any fevers at home or visual changes. Using saline rinses three times daily.    PE:  NAD, AAOx3; CN II-XII intact  EOMi; PERRL; Gross vision intact with no evidence of proptosis or preseptal cellulitis  Nasal Endoscopy: Thick yellow discharge from left middle meatus with post-operative edema noted, but patent maxillary and frontal drainage  Right side with post-operative edema, mild blood, and mildly thick white yellow discharge  OC: Moist musosa, posterior OP with no evidence of erythema or swelling; symmetric uvula elevation  Neck with no LAD; soft with full ROM    Plan:  - Complete Augmentin 875 mg BID course  - Increase saline rinses to QID  - Report to ER for worsening of symptoms including visual changes, fevers, or neck stiffness  - Norco 5-325mg provided (Qty 8 tabs)  - Return to ENT clinic on Tuesday to see Dr. Altman   Yes

## (undated) DEVICE — POWDER ARISTA AH 3G

## (undated) DEVICE — CONTAINER SPECIMEN STRL 4OZ

## (undated) DEVICE — CATH IV INTROCAN 14G X 2.

## (undated) DEVICE — WARMER DRAPE STERILE LF

## (undated) DEVICE — SEE MEDLINE ITEM 152622

## (undated) DEVICE — SEE MEDLINE ITEM 157144

## (undated) DEVICE — SPONGE PATTY SURGICAL .5X3IN

## (undated) DEVICE — SYR 50CC LL

## (undated) DEVICE — SUT 4-0 CHROMIC GUT / RB1

## (undated) DEVICE — MUPIROCIN OINT 2% 22GM

## (undated) DEVICE — BLADE SCALP OPHTL RND TIP

## (undated) DEVICE — DRESSING SURGICAL 1X3

## (undated) DEVICE — ELECTRODE REM PLYHSV RETURN 9

## (undated) DEVICE — APPLICATOR ARISTA FLEX XL

## (undated) DEVICE — SUT PLAIN 4-0 SC-1 18IN

## (undated) DEVICE — SEE MEDLINE ITEM 156913

## (undated) DEVICE — TRACKER PATIENT NON INVASIVE